# Patient Record
Sex: FEMALE | Race: WHITE | Employment: OTHER | URBAN - METROPOLITAN AREA
[De-identification: names, ages, dates, MRNs, and addresses within clinical notes are randomized per-mention and may not be internally consistent; named-entity substitution may affect disease eponyms.]

---

## 2017-05-19 ENCOUNTER — HOSPITAL ENCOUNTER (OUTPATIENT)
Dept: LAB | Age: 77
Discharge: HOME | End: 2017-05-19
Payer: MEDICARE

## 2017-05-19 DIAGNOSIS — D69.3 CHRONIC ITP (IDIOPATHIC THROMBOCYTOPENIA) (HCC): ICD-10-CM

## 2017-05-19 LAB
BASOPHILS # BLD AUTO: 0.1 K/UL (ref 0–0.2)
BASOPHILS # BLD: 1 % (ref 0–2)
DIFFERENTIAL METHOD BLD: ABNORMAL
EOSINOPHIL # BLD: 0.2 K/UL (ref 0–0.8)
EOSINOPHIL NFR BLD: 2 % (ref 0.5–7.8)
ERYTHROCYTE [DISTWIDTH] IN BLOOD BY AUTOMATED COUNT: 15 % (ref 11.9–14.6)
HCT VFR BLD AUTO: 43.9 % (ref 35.8–46.3)
HGB BLD-MCNC: 14.8 G/DL (ref 11.7–15.4)
LYMPHOCYTES # BLD AUTO: 19 % (ref 13–44)
LYMPHOCYTES # BLD: 2 K/UL (ref 0.5–4.6)
MCH RBC QN AUTO: 31.9 PG (ref 26.1–32.9)
MCHC RBC AUTO-ENTMCNC: 33.7 G/DL (ref 31.4–35)
MCV RBC AUTO: 94.6 FL (ref 79.6–97.8)
MONOCYTES # BLD: 1 K/UL (ref 0.1–1.3)
MONOCYTES NFR BLD AUTO: 10 % (ref 4–12)
NEUTS SEG # BLD: 7.3 K/UL (ref 1.7–8.2)
NEUTS SEG NFR BLD AUTO: 69 % (ref 43–78)
NRBC # BLD: 0 K/UL (ref 0–0.2)
PLATELET # BLD AUTO: 167 K/UL (ref 150–450)
PMV BLD AUTO: 8.3 FL (ref 10.8–14.1)
RBC # BLD AUTO: 4.64 M/UL (ref 4.05–5.25)
WBC # BLD AUTO: 10.5 K/UL (ref 4.3–11.1)

## 2017-05-19 PROCEDURE — 85025 COMPLETE CBC W/AUTO DIFF WBC: CPT | Performed by: INTERNAL MEDICINE

## 2017-05-19 PROCEDURE — 36415 COLL VENOUS BLD VENIPUNCTURE: CPT | Performed by: INTERNAL MEDICINE

## 2017-06-26 PROBLEM — R05.9 COUGH: Chronic | Status: ACTIVE | Noted: 2017-06-26

## 2017-06-28 ENCOUNTER — HOSPITAL ENCOUNTER (OUTPATIENT)
Dept: GENERAL RADIOLOGY | Age: 77
Discharge: HOME | End: 2017-06-28
Attending: INTERNAL MEDICINE
Payer: MEDICARE

## 2017-06-28 VITALS — BODY MASS INDEX: 31.15 KG/M2 | WEIGHT: 165 LBS | HEIGHT: 61 IN

## 2017-06-28 DIAGNOSIS — R13.12 OROPHARYNGEAL DYSPHAGIA: ICD-10-CM

## 2017-06-28 PROCEDURE — 74011000255 HC RX REV CODE- 255: Performed by: INTERNAL MEDICINE

## 2017-06-28 PROCEDURE — 74011000250 HC RX REV CODE- 250: Performed by: INTERNAL MEDICINE

## 2017-06-28 PROCEDURE — 74220 X-RAY XM ESOPHAGUS 1CNTRST: CPT

## 2017-06-28 RX ADMIN — BARIUM SULFATE 135 ML: 980 POWDER, FOR SUSPENSION ORAL at 08:35

## 2017-06-28 RX ADMIN — ANTACID/ANTIFLATULENT 4 G: 380; 550; 10; 10 GRANULE, EFFERVESCENT ORAL at 08:35

## 2017-10-03 ENCOUNTER — HOSPITAL ENCOUNTER (OUTPATIENT)
Dept: CT IMAGING | Age: 77
Discharge: HOME | End: 2017-10-03
Attending: INTERNAL MEDICINE
Payer: MEDICARE

## 2017-10-03 DIAGNOSIS — S09.93XA FACIAL INJURY, INITIAL ENCOUNTER: ICD-10-CM

## 2017-10-03 PROCEDURE — 70486 CT MAXILLOFACIAL W/O DYE: CPT

## 2017-10-11 ENCOUNTER — APPOINTMENT (OUTPATIENT)
Dept: GENERAL RADIOLOGY | Age: 77
End: 2017-10-11
Payer: MEDICARE

## 2017-10-11 ENCOUNTER — APPOINTMENT (OUTPATIENT)
Dept: CT IMAGING | Age: 77
End: 2017-10-11
Payer: MEDICARE

## 2017-10-11 ENCOUNTER — HOSPITAL ENCOUNTER (EMERGENCY)
Age: 77
Discharge: HOME | End: 2017-10-11
Attending: EMERGENCY MEDICINE
Payer: MEDICARE

## 2017-10-11 VITALS
RESPIRATION RATE: 18 BRPM | SYSTOLIC BLOOD PRESSURE: 165 MMHG | TEMPERATURE: 98 F | DIASTOLIC BLOOD PRESSURE: 87 MMHG | HEART RATE: 88 BPM | OXYGEN SATURATION: 96 % | HEIGHT: 61 IN | BODY MASS INDEX: 32.47 KG/M2 | WEIGHT: 172 LBS

## 2017-10-11 DIAGNOSIS — S06.0X0A CONCUSSION WITHOUT LOSS OF CONSCIOUSNESS, INITIAL ENCOUNTER: Primary | ICD-10-CM

## 2017-10-11 LAB
ALBUMIN SERPL-MCNC: 3.6 G/DL (ref 3.2–4.6)
ALBUMIN/GLOB SERPL: 0.9 {RATIO} (ref 1.2–3.5)
ALP SERPL-CCNC: 86 U/L (ref 50–136)
ALT SERPL-CCNC: 31 U/L (ref 12–65)
ANION GAP SERPL CALC-SCNC: 10 MMOL/L (ref 7–16)
APTT PPP: 32.5 SEC (ref 23.5–31.7)
AST SERPL-CCNC: 32 U/L (ref 15–37)
ATRIAL RATE: 89 BPM
BASOPHILS # BLD: 0.1 K/UL (ref 0–0.2)
BASOPHILS NFR BLD: 1 % (ref 0–2)
BILIRUB SERPL-MCNC: 0.6 MG/DL (ref 0.2–1.1)
BUN SERPL-MCNC: 16 MG/DL (ref 8–23)
CALCIUM SERPL-MCNC: 9.2 MG/DL (ref 8.3–10.4)
CALCULATED P AXIS, ECG09: 44 DEGREES
CALCULATED R AXIS, ECG10: 36 DEGREES
CALCULATED T AXIS, ECG11: -79 DEGREES
CHLORIDE SERPL-SCNC: 100 MMOL/L (ref 98–107)
CO2 SERPL-SCNC: 27 MMOL/L (ref 21–32)
CREAT SERPL-MCNC: 1.16 MG/DL (ref 0.6–1)
DIAGNOSIS, 93000: NORMAL
DIFFERENTIAL METHOD BLD: ABNORMAL
EOSINOPHIL # BLD: 0.4 K/UL (ref 0–0.8)
EOSINOPHIL NFR BLD: 5 % (ref 0.5–7.8)
ERYTHROCYTE [DISTWIDTH] IN BLOOD BY AUTOMATED COUNT: 13.9 % (ref 11.9–14.6)
GLOBULIN SER CALC-MCNC: 4.2 G/DL (ref 2.3–3.5)
GLUCOSE SERPL-MCNC: 103 MG/DL (ref 65–100)
HCT VFR BLD AUTO: 39.9 % (ref 35.8–46.3)
HGB BLD-MCNC: 13.6 G/DL (ref 11.7–15.4)
IMM GRANULOCYTES # BLD: 0 K/UL (ref 0–0.5)
IMM GRANULOCYTES NFR BLD: 0.5 % (ref 0–5)
LYMPHOCYTES # BLD: 3.5 K/UL (ref 0.5–4.6)
LYMPHOCYTES NFR BLD: 43 % (ref 13–44)
MCH RBC QN AUTO: 31.6 PG (ref 26.1–32.9)
MCHC RBC AUTO-ENTMCNC: 34.1 G/DL (ref 31.4–35)
MCV RBC AUTO: 92.8 FL (ref 79.6–97.8)
MONOCYTES # BLD: 0.9 K/UL (ref 0.1–1.3)
MONOCYTES NFR BLD: 12 % (ref 4–12)
NEUTS SEG # BLD: 3.2 K/UL (ref 1.7–8.2)
NEUTS SEG NFR BLD: 39 % (ref 43–78)
P-R INTERVAL, ECG05: 186 MS
PLATELET # BLD AUTO: 276 K/UL (ref 150–450)
PMV BLD AUTO: 9 FL (ref 10.8–14.1)
POTASSIUM SERPL-SCNC: 3.8 MMOL/L (ref 3.5–5.1)
PROT SERPL-MCNC: 7.8 G/DL (ref 6.3–8.2)
Q-T INTERVAL, ECG07: 378 MS
QRS DURATION, ECG06: 130 MS
QTC CALCULATION (BEZET), ECG08: 459 MS
RBC # BLD AUTO: 4.3 M/UL (ref 4.05–5.25)
SODIUM SERPL-SCNC: 137 MMOL/L (ref 136–145)
VENTRICULAR RATE, ECG03: 89 BPM
WBC # BLD AUTO: 8.1 K/UL (ref 4.3–11.1)

## 2017-10-11 PROCEDURE — 80053 COMPREHEN METABOLIC PANEL: CPT | Performed by: PHYSICIAN ASSISTANT

## 2017-10-11 PROCEDURE — 70450 CT HEAD/BRAIN W/O DYE: CPT

## 2017-10-11 PROCEDURE — 85730 THROMBOPLASTIN TIME PARTIAL: CPT | Performed by: PHYSICIAN ASSISTANT

## 2017-10-11 PROCEDURE — 93005 ELECTROCARDIOGRAM TRACING: CPT | Performed by: PHYSICIAN ASSISTANT

## 2017-10-11 PROCEDURE — 71020 XR CHEST PA LAT: CPT

## 2017-10-11 PROCEDURE — 85025 COMPLETE CBC W/AUTO DIFF WBC: CPT | Performed by: PHYSICIAN ASSISTANT

## 2017-10-11 PROCEDURE — 99284 EMERGENCY DEPT VISIT MOD MDM: CPT | Performed by: EMERGENCY MEDICINE

## 2017-10-11 RX ORDER — MECLIZINE HYDROCHLORIDE 25 MG/1
25 TABLET ORAL
Qty: 19 TAB | Refills: 0 | Status: SHIPPED | OUTPATIENT
Start: 2017-10-11 | End: 2017-11-14

## 2017-10-11 RX ORDER — ONDANSETRON 4 MG/1
4 TABLET, ORALLY DISINTEGRATING ORAL
Qty: 15 TAB | Refills: 0 | Status: SHIPPED | OUTPATIENT
Start: 2017-10-11 | End: 2018-10-15 | Stop reason: SDUPTHER

## 2017-10-11 NOTE — ED TRIAGE NOTES
Pt arrived ambulatory via POV with c/o nausea. Pt stated she has been \"blacking out\" and \"blacked out\" last Sunday and fell on her face. She had CT done and no fx noted. Pt had f/u with provider office yesterday for continued episodes of \"blacking out\" and nausea.

## 2017-10-11 NOTE — DISCHARGE INSTRUCTIONS
Concussion: Care Instructions  Your Care Instructions    A concussion is a kind of injury to the brain. It happens when the head receives a hard blow. The impact can jar or shake the brain against the skull. This interrupts the brain's normal activities. Although you may have cuts or bruises on your head or face, you may have no other visible signs of a brain injury. In most cases, damage to the brain from a concussion can't be seen in tests such as a CT or MRI scan. For a few weeks, you may have low energy, dizziness, trouble sleeping, a headache, ringing in your ears, or nausea. You may also feel anxious, grumpy, or depressed. You may have problems with memory and concentration. These symptoms are common after a concussion. They should slowly improve over time. Sometimes this takes weeks or even months. Someone who lives with you should know how to care for you. Please share this and all information with a caregiver who will be available to help if needed. Follow-up care is a key part of your treatment and safety. Be sure to make and go to all appointments, and call your doctor if you are having problems. It's also a good idea to know your test results and keep a list of the medicines you take. How can you care for yourself at home? Pain control  · Put ice or a cold pack on the part of your head that hurts for 10 to 20 minutes at a time. Put a thin cloth between the ice and your skin. · Be safe with medicines. Read and follow all instructions on the label. ¨ If the doctor gave you a prescription medicine for pain, take it as prescribed. ¨ If you are not taking a prescription pain medicine, ask your doctor if you can take an over-the-counter medicine. Recovery  · Follow your doctor's instructions. He or she will tell you if you need someone to watch you closely for the next 24 hours or longer. · Rest is the best way to recover from a concussion.  You need to rest your body and your brain:  ¨ Get plenty of sleep at night. And take rest breaks during the day. ¨ Avoid activities that take a lot of physical or mental work. This includes housework, exercise, schoolwork, video games, text messaging, and using the computer. ¨ You may need to change your school or work schedule while you recover. ¨ Return to your normal activities slowly. Do not try to do too much at once. · Do not drink alcohol or use illegal drugs. Alcohol and illegal drugs can slow your recovery. And they can increase your risk of a second brain injury. · Avoid activities that could lead to another concussion. Follow your doctor's instructions for a gradual return to activity and sports. · Ask your doctor when it's okay for you to drive a car, ride a bike, or operate machinery. How should you return to activity? Your return to sports or activity should be gradual. It should only begin when all symptoms of a concussion are gone, both while at rest and during exercise or exertion. Doctors and concussion specialists suggest steps to follow for returning to sports after a concussion. Use these steps as a guide. In most places, your doctor must give you written permission for your child to begin the steps and return to sports. You should slowly progress through the following levels of activity:  1. No activity. This means complete physical and mental rest.  2. Light aerobic activity. This can include walking, swimming, or other exercise at less than 70% of maximum heart rate. No resistance training is included in this step. 3. Sport-specific exercise. This includes running drills or skating drills (depending on the sport), but no head impact. 4. Noncontact training drills. This includes more complex training drills such as passing. The athlete may also begin light resistance training. 5. Full-contact practice. The athlete can participate in normal training. 6. Return to normal game play.  This is the final step and allows the athlete to join in normal game play. Watch and keep track of your progress. It should take at least 6 days for you to go from light activity to normal game play. Make sure that you can stay at each new level of activity for at least 24 hours without symptoms, or as long as your doctor says, before doing more. If one or more symptoms come back, return to a lower level of activity for at least 24 hours. Don't move on until all symptoms are gone. When should you call for help? Call 911 anytime you think you may need emergency care. For example, call if:  · You have a seizure. · You passed out (lost consciousness). · You are confused or can't stay awake. Call your doctor now or seek immediate medical care if:  · You have new or worse vomiting. · You feel less alert. · You have new weakness or numbness in any part of your body. Watch closely for changes in your health, and be sure to contact your doctor if:  · You do not get better as expected. · You have new symptoms, such as headaches, trouble concentrating, or changes in mood. Where can you learn more? Go to http://tirso-randell.info/. Enter I947 in the search box to learn more about \"Concussion: Care Instructions. \"  Current as of: September 20, 2016  Content Version: 11.3  © 5962-3274 [employer]. Care instructions adapted under license by [employer] (which disclaims liability or warranty for this information). If you have questions about a medical condition or this instruction, always ask your healthcare professional. [place] [address] any warranty or liability for your use of this information.

## 2017-10-11 NOTE — ED NOTES
I have reviewed discharge instructions with the patient. The patient verbalized understanding. Patient left ED via Discharge Method: wheelchair to Home with spouse. Opportunity for questions and clarification provided. Patient given 2 scripts.

## 2017-10-11 NOTE — ED PROVIDER NOTES
HPI Comments: 72-year-old female presents with complaints of headache, nausea, and dizzy spells. Ongoing for partially week and a half resulting from a fall where she fell on the grass and fell on her face. Patient had an outpatient CT of the maxillofacial bones, which was unremarkable. Her primary care doctor has her scheduled for MRI of the brain next week. Patient is a 68 y.o. female presenting with nausea. The history is provided by the patient and the spouse. Nausea    This is a recurrent problem. The current episode started more than 2 days ago. The problem has not changed since onset. There has been no fever. Associated symptoms include headaches, arthralgias, cough and headaches. Pertinent negatives include no chills, no fever, no abdominal pain, no diarrhea and no myalgias. The patient is not pregnant. Risk factors: closed head injury. Her pertinent negatives include no irritable bowel syndrome, no gastric bypass and no DM.         Past Medical History:   Diagnosis Date    Acute on chronic combined systolic and diastolic heart failure ([address] 6/11/2015    Acute renal failure (ARF) ([address] 6/13/2015    Adiposity 11/10/2015    Arthritis 4/9/2014    Bilateral carotid artery stenosis     CAD (coronary artery disease) 4/9/2014    Moderate CAD 4/15     Coagulation disorder ([address]     Congestive heart failure (CHF) ([address]     Dyspnea 6/11/2015    Dyspnea on exertion 4/9/2014    GERD (gastroesophageal reflux disease)     History of ITP     2012 in [facility] for treatments-last one Nov '12    Hyperlipidemia 4/9/2014    Hypertension 4/9/2014    Hyponatremia 6/11/2015    Hypothyroid     Hypothyroidism 6/11/2015    Hypoxemia 7/16/2014    Left atrial dilation     Liver disease     elevated enzymes-     Lung nodule 4/9/2014    [facility]15/[facility]--10 mm nodule in right mid lung; PET negative 6/13/2011 CT  10/16/2011 @ [place], [place]--Multiple right lung nodules without adenopathy     MI (myocardial infarction)     2009- no intervention- mild/ EF per echo 9/5/13= 40%    Nausea and vomiting 6/13/2016    Other ill-defined conditions(799.89)     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    Pulmonary fibrosis ([address] 4/9/2014    [facility]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     Pulmonary hypertension     Pulmonary valve regurgitation     PVC (premature ventricular contraction) 4/25/2014    Raynaud's disease 4/9/2014    Right atrial dilation     Severe tricuspid regurgitation     Sicca syndrome, Sjogren's ([address] 4/9/2015    Thrombocytopenia ([address] 10/12/2015       Past Surgical History:   Procedure Laterality Date    HX APPENDECTOMY      HX COLONOSCOPY      2002 or 2004 polyps Elizabeth    HX HERNIA REPAIR      bilat    HX HYSTERECTOMY      HX ORTHOPAEDIC      spinal/transplant 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 Social History Main Topics    Smoking status: Never Smoker    Smokeless tobacco: Never Used    Alcohol use Yes      Comment: occas    Drug use: No    Sexual activity: Not on file     Other Topics Concern    Not on file     Social History Narrative     and lives alone. Daughter lives in [place]. No pets. Retired, previously worked as an . ALLERGIES: Other food; Esomeprazole magnesium; Iodinated contrast- oral and iv dye; Iodine and iodide containing products; Shellfish derived; Amitriptyline; Amlodipine; Amoxicillin; Benicar [olmesartan];  Bystolic [nebivolol]; Flecainide; Hydrochlorothiazide; Ibuprofen; Iodine; Keflex [cephalexin]; Levofloxacin (bulk); Lisinopril; Lisinopril-hydrochlorothiazide; Nexium [esomeprazole magnesium]; Nitrofurantoin; Nitrofurantoin macrocrystal; Nitrofurantoin macrocrystalline; and Sulfa (sulfonamide antibiotics)    Review of Systems   Constitutional: Negative for chills and fever. HENT: Negative for congestion, ear pain and rhinorrhea. Eyes: Negative for photophobia and discharge. Respiratory: Positive for cough and wheezing. Negative for shortness of breath. Cardiovascular: Negative for chest pain and palpitations. Gastrointestinal: Positive for nausea. Negative for abdominal pain, constipation, diarrhea and vomiting. Endocrine: Negative for cold intolerance and heat intolerance. Genitourinary: Negative for dysuria and flank pain. Musculoskeletal: Positive for arthralgias. Negative for myalgias and neck pain. Skin: Negative for rash and wound. Allergic/Immunologic: Negative for environmental allergies and food allergies. Neurological: Positive for headaches. Negative for syncope. Hematological: Negative for adenopathy. Does not bruise/bleed easily. Psychiatric/Behavioral: Negative for dysphoric mood. The patient is not nervous/anxious. All other systems reviewed and are negative. Vitals:    10/11/17 1628   BP: 166/88   Pulse: 91   Resp: 20   Temp: 98 °F (36.7 °C)   SpO2: 98%   Weight: 78 kg (172 lb)   Height: 5' 1\" (1.549 m)            Physical Exam   Constitutional: She is oriented to person, place, and time. She appears well-developed and well-nourished. She appears distressed. HENT:   Head: Normocephalic and atraumatic. Mouth/Throat: Oropharynx is clear and moist.   Eyes: Conjunctivae and EOM are normal. Pupils are equal, round, and reactive to light. Right eye exhibits no discharge. Left eye exhibits no discharge. No scleral icterus. Neck: Normal range of motion. Neck supple.  No thyromegaly present. Cardiovascular: Normal rate, regular rhythm, normal heart sounds and intact distal pulses. Exam reveals no gallop and no friction rub. No murmur heard. Pulmonary/Chest: Effort normal and breath sounds normal. No respiratory distress. She has no wheezes. She exhibits no tenderness. Abdominal: Soft. Bowel sounds are normal. She exhibits no distension. There is no hepatosplenomegaly. There is no tenderness. There is no rebound and no guarding. No hernia. Tolerating ice chips   Musculoskeletal: Normal range of motion. She exhibits no edema or tenderness. Neurological: She is alert and oriented to person, place, and time. No cranial nerve deficit. She exhibits normal muscle tone. GCS eye subscore is 4. GCS verbal subscore is 5. GCS motor subscore is 6. Skin: Skin is warm, dry and intact. No rash noted. She is not diaphoretic. No erythema. No pallor. Psychiatric: She has a normal mood and affect. Her speech is normal and behavior is normal. Judgment and thought content normal. Cognition and memory are normal.   Nursing note and vitals reviewed. MDM  Number of Diagnoses or Management Options  Concussion without loss of consciousness, initial encounter: established and worsening  Diagnosis management comments: `cT head unremarkable    patient is not anemic, has normal renal function, normal electrolytes. Patient's symptoms are consistent with a concussion. We'll treat her symptomatically and have asked her to recheck with her family doctor       Amount and/or Complexity of Data Reviewed  Clinical lab tests: ordered and reviewed  Tests in the radiology section of CPT®: ordered and reviewed  Review and summarize past medical records: yes    Risk of Complications, Morbidity, and/or Mortality  Presenting problems: high  Diagnostic procedures: moderate  Management options: moderate  General comments: Elements of this note have been dictated via voice recognition software.   Text and phrases may be limited by the accuracy of the software. The chart has been reviewed, but errors may still be present. Patient Progress  Patient progress: improved    ED Course   Comment By Time   CHARISSE Rivera is a 68 y.o. female here for syncope multiple times, facial injury. Rapid assessment performed. --- Orders were placed. --- Patient will be roomed. I have seen and briefly evaluated the patient in triage in order to expedite their workup and treatment as defined by the department policy and procedure. Their care will be completed in the emergency department by another provider. The work up will not be complete until this further evaluation is completed by another provider.     Signed By: ANUPAMA Willis    October 11, 2017 Vivi Willis [id number]       Procedures

## 2017-10-14 ENCOUNTER — PATIENT OUTREACH (OUTPATIENT)
Dept: CASE MANAGEMENT | Age: 77
End: 2017-10-14

## 2017-10-14 NOTE — PROGRESS NOTES
Date/Time of Call:       10/12/17 10:30AM   What was the patient seen in the ED for? Patient was seen in ED for diagnosis of Concussion w/out loss of conciousness    Does the patient understand his/her diagnosis and/or treatment and what happened during the ED visit? Spoke with patient who stated understanding of treatment and diagnosis. Did the patient receive discharge instructions from the ED? Patient stated receiving d/c instructions from the ED. Review any discharge instructions (see notes in [facility]). Ask patient if they understand these. Do they have any questions? Patient and Care Coordinator reviewed DC instructions. Patient stated understanding and no questions asked. Were home services ordered (nursing, PT, OT, ST, etc.)? No HH ordered at d/c   If so, has the first visit occurred? If not, why? (Assist with coordination of services if necessary.) N/A   DME ordered at d/c? No DME ordered at d/c. Pt. states she has access to  w/c, walker, and cane as needed   If so, has it been received? If not, why?  (Assist with coordination of arranging DME orders if necessary.) N/A   Complete a review of all medications (new, continued and discontinued meds per the D/C instructions and medication tab in [address] [place] [address]). Review of medications has been completed by Patient and Care Coordinator. Zofran and Antivert prescribed at d/c. Were all new prescriptions filled? If not, why?  (Assist with obtainment of medications if necessary.) Yes. Does the patient understand the purpose and dosing instructions for all medications? (If patient has questions, provide explanation and education.) Patient stated understanding of purpose, and dosing instructions for all medications. Does the patient have any problems in performing ADLs? (If patient is unable to perform ADLs  what is the limiting factor(s)?   Do they have a support system that can assist? If no support system is present, discuss possible assistance that they may be able to obtain.) Patient stated she is independent with all ADLs. Does the patient have all follow-up appointments scheduled? Has transportation been arranged? 7 day f/up with PCP?    7-14 day f/up with specialist?    If f/up has not been made  what actions has the care coordinator made to accomplish this? Has transportation been arranged? [facility] follow-up should be within 7 days of discharge; all others should have PCP follow-up within 7 days of discharge; follow-ups with other specialists as appropriate or ordered.) PCP appt. on Monday, and pt. states she has transportation. Any other questions or concerns expressed by the patient? No other questions asked. No further needs identified. Gratitude expressed for care and call. ALEJO Call Completed By: Gretel Bates LPN   Care Coordinator  [facility]                                 This note will not be viewable in [address]

## 2017-11-14 ENCOUNTER — HOSPITAL ENCOUNTER (OUTPATIENT)
Dept: LAB | Age: 77
Discharge: HOME | End: 2017-11-14
Attending: INTERNAL MEDICINE
Payer: MEDICARE

## 2017-11-14 ENCOUNTER — HOSPITAL ENCOUNTER (OUTPATIENT)
Dept: LAB | Age: 77
Discharge: HOME | End: 2017-11-14
Payer: MEDICARE

## 2017-11-14 DIAGNOSIS — D69.3 CHRONIC ITP (IDIOPATHIC THROMBOCYTOPENIA) (HCC): ICD-10-CM

## 2017-11-14 DIAGNOSIS — I42.0 CARDIOMYOPATHY, DILATED (HCC): ICD-10-CM

## 2017-11-14 LAB
ALBUMIN SERPL-MCNC: 3.9 G/DL (ref 3.2–4.6)
ALBUMIN SERPL-MCNC: 4 G/DL (ref 3.2–4.6)
ALBUMIN/GLOB SERPL: 1 {RATIO}
ALBUMIN/GLOB SERPL: 1 {RATIO} (ref 1.2–3.5)
ALP SERPL-CCNC: 80 U/L (ref 50–136)
ALP SERPL-CCNC: 83 U/L (ref 50–136)
ALT SERPL-CCNC: 22 U/L (ref 12–65)
ALT SERPL-CCNC: 26 U/L (ref 12–65)
ANION GAP SERPL CALC-SCNC: 8 MMOL/L
ANION GAP SERPL CALC-SCNC: 8 MMOL/L (ref 7–16)
AST SERPL-CCNC: 22 U/L (ref 15–37)
AST SERPL-CCNC: 24 U/L (ref 15–37)
BASOPHILS # BLD: 0.1 K/UL (ref 0–0.2)
BASOPHILS NFR BLD: 1 % (ref 0–2)
BILIRUB SERPL-MCNC: 0.8 MG/DL (ref 0.2–1.1)
BILIRUB SERPL-MCNC: 0.8 MG/DL (ref 0.2–1.1)
BNP SERPL-MCNC: 131 PG/ML
BUN SERPL-MCNC: 16 MG/DL (ref 8–23)
BUN SERPL-MCNC: 17 MG/DL (ref 8–23)
CALCIUM SERPL-MCNC: 10 MG/DL (ref 8.3–10.4)
CALCIUM SERPL-MCNC: 9.7 MG/DL (ref 8.3–10.4)
CHLORIDE SERPL-SCNC: 97 MMOL/L (ref 98–107)
CHLORIDE SERPL-SCNC: 97 MMOL/L (ref 98–107)
CO2 SERPL-SCNC: 30 MMOL/L (ref 21–32)
CO2 SERPL-SCNC: 30 MMOL/L (ref 21–32)
CREAT SERPL-MCNC: 1.1 MG/DL (ref 0.6–1)
CREAT SERPL-MCNC: 1.11 MG/DL (ref 0.6–1)
DIFFERENTIAL METHOD BLD: ABNORMAL
EOSINOPHIL # BLD: 0.3 K/UL (ref 0–0.8)
EOSINOPHIL NFR BLD: 4 % (ref 0.5–7.8)
ERYTHROCYTE [DISTWIDTH] IN BLOOD BY AUTOMATED COUNT: 13.3 % (ref 11.9–14.6)
GLOBULIN SER CALC-MCNC: 4.1 G/DL
GLOBULIN SER CALC-MCNC: 4.1 G/DL (ref 2.3–3.5)
GLUCOSE SERPL-MCNC: 82 MG/DL (ref 65–100)
GLUCOSE SERPL-MCNC: 83 MG/DL (ref 65–100)
HCT VFR BLD AUTO: 42.5 % (ref 35.8–46.3)
HGB BLD-MCNC: 14.2 G/DL (ref 11.7–15.4)
LYMPHOCYTES # BLD: 1.9 K/UL (ref 0.5–4.6)
LYMPHOCYTES NFR BLD: 23 % (ref 13–44)
MAGNESIUM SERPL-MCNC: 1.8 MG/DL (ref 1.8–2.4)
MCH RBC QN AUTO: 31.3 PG (ref 26.1–32.9)
MCHC RBC AUTO-ENTMCNC: 33.4 G/DL (ref 31.4–35)
MCV RBC AUTO: 93.6 FL (ref 79.6–97.8)
MONOCYTES # BLD: 0.9 K/UL (ref 0.1–1.3)
MONOCYTES NFR BLD: 11 % (ref 4–12)
NEUTS SEG # BLD: 4.9 K/UL (ref 1.7–8.2)
NEUTS SEG NFR BLD: 60 % (ref 43–78)
NRBC # BLD: 0 K/UL (ref 0–0.2)
PLATELET # BLD AUTO: 235 K/UL (ref 150–450)
PMV BLD AUTO: 8.9 FL (ref 10.8–14.1)
POTASSIUM SERPL-SCNC: 3.7 MMOL/L (ref 3.5–5.1)
POTASSIUM SERPL-SCNC: 3.9 MMOL/L (ref 3.5–5.1)
PROT SERPL-MCNC: 8 G/DL (ref 6.3–8.2)
PROT SERPL-MCNC: 8.1 G/DL (ref 6.3–8.2)
RBC # BLD AUTO: 4.54 M/UL (ref 4.05–5.25)
SODIUM SERPL-SCNC: 135 MMOL/L (ref 136–145)
SODIUM SERPL-SCNC: 135 MMOL/L (ref 136–145)
TSH SERPL DL<=0.005 MIU/L-ACNC: 2.57 UIU/ML (ref 0.36–3.74)
WBC # BLD AUTO: 8.1 K/UL (ref 4.3–11.1)

## 2017-11-14 PROCEDURE — 83880 ASSAY OF NATRIURETIC PEPTIDE: CPT | Performed by: INTERNAL MEDICINE

## 2017-11-14 PROCEDURE — 83735 ASSAY OF MAGNESIUM: CPT | Performed by: INTERNAL MEDICINE

## 2017-11-14 PROCEDURE — 36415 COLL VENOUS BLD VENIPUNCTURE: CPT | Performed by: INTERNAL MEDICINE

## 2017-11-14 PROCEDURE — 84443 ASSAY THYROID STIM HORMONE: CPT | Performed by: INTERNAL MEDICINE

## 2017-11-14 PROCEDURE — 80053 COMPREHEN METABOLIC PANEL: CPT | Performed by: INTERNAL MEDICINE

## 2017-11-14 NOTE — PROGRESS NOTES
Patient returned my call and I informed her of her results and to remain on lasix. She gave verbal understanding.

## 2017-11-14 NOTE — PROGRESS NOTES
Please call her, remain on lasix. Labs reviewed. No med changes. We will wait on echo and see back after this week.   Thanks

## 2018-01-09 PROBLEM — E55.9 HYPOVITAMINOSIS D: Chronic | Status: ACTIVE | Noted: 2018-01-09

## 2018-01-09 PROBLEM — M15.9 PRIMARY OSTEOARTHRITIS INVOLVING MULTIPLE JOINTS: Chronic | Status: ACTIVE | Noted: 2018-01-09

## 2018-01-09 PROBLEM — K52.831 COLLAGENOUS COLITIS: Chronic | Status: ACTIVE | Noted: 2018-01-09

## 2018-01-17 ENCOUNTER — HOSPITAL ENCOUNTER (OUTPATIENT)
Dept: LAB | Age: 78
Discharge: HOME | End: 2018-01-17
Payer: MEDICARE

## 2018-01-17 DIAGNOSIS — I42.0 CARDIOMYOPATHY, DILATED (HCC): ICD-10-CM

## 2018-01-17 LAB
ANION GAP SERPL CALC-SCNC: 7 MMOL/L
BNP SERPL-MCNC: 168 PG/ML
BUN SERPL-MCNC: 13 MG/DL (ref 8–23)
CALCIUM SERPL-MCNC: 9.9 MG/DL (ref 8.3–10.4)
CHLORIDE SERPL-SCNC: 100 MMOL/L (ref 98–107)
CO2 SERPL-SCNC: 28 MMOL/L (ref 21–32)
CREAT SERPL-MCNC: 1 MG/DL (ref 0.6–1)
GLUCOSE SERPL-MCNC: 89 MG/DL (ref 65–100)
MAGNESIUM SERPL-MCNC: 1.7 MG/DL (ref 1.8–2.4)
POTASSIUM SERPL-SCNC: 4.1 MMOL/L (ref 3.5–5.1)
SODIUM SERPL-SCNC: 135 MMOL/L (ref 136–145)

## 2018-01-17 PROCEDURE — 83880 ASSAY OF NATRIURETIC PEPTIDE: CPT | Performed by: INTERNAL MEDICINE

## 2018-01-17 PROCEDURE — 80048 BASIC METABOLIC PNL TOTAL CA: CPT | Performed by: INTERNAL MEDICINE

## 2018-01-17 PROCEDURE — 36415 COLL VENOUS BLD VENIPUNCTURE: CPT | Performed by: INTERNAL MEDICINE

## 2018-01-17 PROCEDURE — 83735 ASSAY OF MAGNESIUM: CPT | Performed by: INTERNAL MEDICINE

## 2018-01-18 NOTE — PROGRESS NOTES
I called and informed patient of her lab results. She stated that she is feeling much better and has no current concerns. She informed me that she experienced 2 or 3 sharp pains in her breast last week but they went away as fast as they came and she has been fine since. She will keep her appointment on 2/2 to f/u and discuss those pains with Dr. Benjamin Hager. Patient verbalized understanding and appreciation.

## 2018-02-27 ENCOUNTER — HOSPITAL ENCOUNTER (OUTPATIENT)
Dept: LAB | Age: 78
Discharge: HOME | End: 2018-02-27
Payer: MEDICARE

## 2018-02-27 DIAGNOSIS — I25.119 CORONARY ARTERY DISEASE INVOLVING NATIVE CORONARY ARTERY OF NATIVE HEART WITH ANGINA PECTORIS (HCC): Chronic | ICD-10-CM

## 2018-02-27 LAB
ANION GAP SERPL CALC-SCNC: 8 MMOL/L
BASOPHILS # BLD: 0 K/UL (ref 0–0.2)
BASOPHILS NFR BLD: 0 % (ref 0–2)
BNP SERPL-MCNC: 215 PG/ML
BUN SERPL-MCNC: 12 MG/DL (ref 8–23)
CALCIUM SERPL-MCNC: 9.5 MG/DL (ref 8.3–10.4)
CHLORIDE SERPL-SCNC: 96 MMOL/L (ref 98–107)
CO2 SERPL-SCNC: 28 MMOL/L (ref 21–32)
CREAT SERPL-MCNC: 0.9 MG/DL (ref 0.6–1)
DIFFERENTIAL METHOD BLD: ABNORMAL
EOSINOPHIL # BLD: 0.1 K/UL (ref 0–0.8)
EOSINOPHIL NFR BLD: 2 % (ref 0.5–7.8)
ERYTHROCYTE [DISTWIDTH] IN BLOOD BY AUTOMATED COUNT: 13.5 % (ref 11.9–14.6)
GLUCOSE SERPL-MCNC: 104 MG/DL (ref 65–100)
HCT VFR BLD AUTO: 40.5 % (ref 35.8–46.3)
HGB BLD-MCNC: 13.5 G/DL (ref 11.7–15.4)
INR PPP: 0.9
LYMPHOCYTES # BLD: 1.5 K/UL (ref 0.5–4.6)
LYMPHOCYTES NFR BLD: 17 % (ref 13–44)
MCH RBC QN AUTO: 31.5 PG (ref 26.1–32.9)
MCHC RBC AUTO-ENTMCNC: 33.3 G/DL (ref 31.4–35)
MCV RBC AUTO: 94.6 FL (ref 79.6–97.8)
MONOCYTES # BLD: 1 K/UL (ref 0.1–1.3)
MONOCYTES NFR BLD: 11 % (ref 4–12)
NEUTS SEG # BLD: 6.1 K/UL (ref 1.7–8.2)
NEUTS SEG NFR BLD: 70 % (ref 43–78)
PLATELET # BLD AUTO: 248 K/UL (ref 150–450)
PMV BLD AUTO: 8.3 FL (ref 10.8–14.1)
POTASSIUM SERPL-SCNC: 4.1 MMOL/L (ref 3.5–5.1)
PROTHROMBIN TIME: 12.4 SEC (ref 11.5–14.5)
RBC # BLD AUTO: 4.28 M/UL (ref 4.05–5.25)
SODIUM SERPL-SCNC: 132 MMOL/L (ref 136–145)
WBC # BLD AUTO: 8.7 K/UL (ref 4.3–11.1)

## 2018-02-27 PROCEDURE — 83880 ASSAY OF NATRIURETIC PEPTIDE: CPT | Performed by: INTERNAL MEDICINE

## 2018-02-27 PROCEDURE — 36415 COLL VENOUS BLD VENIPUNCTURE: CPT | Performed by: INTERNAL MEDICINE

## 2018-02-27 PROCEDURE — 85025 COMPLETE CBC W/AUTO DIFF WBC: CPT | Performed by: INTERNAL MEDICINE

## 2018-02-27 PROCEDURE — 85610 PROTHROMBIN TIME: CPT | Performed by: INTERNAL MEDICINE

## 2018-02-27 PROCEDURE — 80048 BASIC METABOLIC PNL TOTAL CA: CPT | Performed by: INTERNAL MEDICINE

## 2018-02-27 NOTE — PROGRESS NOTES
I called and informed the patient of lab results. Patient stated that she did not have any questions or concerns at this time.

## 2018-03-09 ENCOUNTER — HOSPITAL ENCOUNTER (OUTPATIENT)
Dept: CARDIAC CATH/INVASIVE PROCEDURES | Age: 78
Discharge: HOME | End: 2018-03-09
Attending: INTERNAL MEDICINE | Admitting: INTERNAL MEDICINE
Payer: MEDICARE

## 2018-03-09 VITALS
HEIGHT: 61 IN | BODY MASS INDEX: 30.58 KG/M2 | HEART RATE: 65 BPM | SYSTOLIC BLOOD PRESSURE: 175 MMHG | DIASTOLIC BLOOD PRESSURE: 97 MMHG | WEIGHT: 162 LBS | OXYGEN SATURATION: 100 % | RESPIRATION RATE: 16 BRPM | TEMPERATURE: 97.6 F

## 2018-03-09 LAB
ATRIAL RATE: 72 BPM
CALCULATED P AXIS, ECG09: 48 DEGREES
CALCULATED R AXIS, ECG10: 31 DEGREES
CALCULATED T AXIS, ECG11: 20 DEGREES
DIAGNOSIS, 93000: NORMAL
P-R INTERVAL, ECG05: 204 MS
Q-T INTERVAL, ECG07: 426 MS
QRS DURATION, ECG06: 134 MS
QTC CALCULATION (BEZET), ECG08: 466 MS
VENTRICULAR RATE, ECG03: 72 BPM

## 2018-03-09 PROCEDURE — 74011636320 HC RX REV CODE- 636/320: Performed by: INTERNAL MEDICINE

## 2018-03-09 PROCEDURE — C1751 CATH, INF, PER/CENT/MIDLINE: HCPCS

## 2018-03-09 PROCEDURE — 74011250636 HC RX REV CODE- 250/636: Performed by: INTERNAL MEDICINE

## 2018-03-09 PROCEDURE — 99153 MOD SED SAME PHYS/QHP EA: CPT

## 2018-03-09 PROCEDURE — C1894 INTRO/SHEATH, NON-LASER: HCPCS

## 2018-03-09 PROCEDURE — 74011250637 HC RX REV CODE- 250/637: Performed by: INTERNAL MEDICINE

## 2018-03-09 PROCEDURE — 74011250636 HC RX REV CODE- 250/636

## 2018-03-09 PROCEDURE — 77030004534 HC CATH ANGI DX INFN CARD -A

## 2018-03-09 PROCEDURE — 99152 MOD SED SAME PHYS/QHP 5/>YRS: CPT

## 2018-03-09 PROCEDURE — C1769 GUIDE WIRE: HCPCS

## 2018-03-09 PROCEDURE — 77030004558 HC CATH ANGI DX SUPR TORQ CARD -A

## 2018-03-09 PROCEDURE — 77030019569 HC BND COMPR RAD TERU -B

## 2018-03-09 PROCEDURE — 93460 R&L HRT ART/VENTRICLE ANGIO: CPT

## 2018-03-09 PROCEDURE — 74011000250 HC RX REV CODE- 250: Performed by: INTERNAL MEDICINE

## 2018-03-09 PROCEDURE — 93005 ELECTROCARDIOGRAM TRACING: CPT | Performed by: INTERNAL MEDICINE

## 2018-03-09 RX ORDER — HEPARIN SODIUM 200 [USP'U]/100ML
3 INJECTION, SOLUTION INTRAVENOUS CONTINUOUS
Status: DISCONTINUED | OUTPATIENT
Start: 2018-03-09 | End: 2018-03-09

## 2018-03-09 RX ORDER — GUAIFENESIN 100 MG/5ML
81-324 LIQUID (ML) ORAL ONCE
Status: COMPLETED | OUTPATIENT
Start: 2018-03-09 | End: 2018-03-09

## 2018-03-09 RX ORDER — DIAZEPAM 5 MG/1
5 TABLET ORAL ONCE
Status: DISCONTINUED | OUTPATIENT
Start: 2018-03-09 | End: 2018-03-09 | Stop reason: HOSPADM

## 2018-03-09 RX ORDER — SODIUM CHLORIDE 9 MG/ML
75 INJECTION, SOLUTION INTRAVENOUS CONTINUOUS
Status: DISCONTINUED | OUTPATIENT
Start: 2018-03-09 | End: 2018-03-09 | Stop reason: HOSPADM

## 2018-03-09 RX ORDER — ACETAMINOPHEN 325 MG/1
650 TABLET ORAL ONCE
Status: COMPLETED | OUTPATIENT
Start: 2018-03-09 | End: 2018-03-09

## 2018-03-09 RX ORDER — MIDAZOLAM HYDROCHLORIDE 1 MG/ML
.5-2 INJECTION, SOLUTION INTRAMUSCULAR; INTRAVENOUS
Status: DISCONTINUED | OUTPATIENT
Start: 2018-03-09 | End: 2018-03-09

## 2018-03-09 RX ORDER — FAMOTIDINE 10 MG/ML
20 INJECTION INTRAVENOUS ONCE
Status: COMPLETED | OUTPATIENT
Start: 2018-03-09 | End: 2018-03-09

## 2018-03-09 RX ORDER — HYDROCORTISONE SODIUM SUCCINATE 100 MG/2ML
100 INJECTION, POWDER, FOR SOLUTION INTRAMUSCULAR; INTRAVENOUS ONCE
Status: COMPLETED | OUTPATIENT
Start: 2018-03-09 | End: 2018-03-09

## 2018-03-09 RX ORDER — LIDOCAINE HYDROCHLORIDE 20 MG/ML
60-200 INJECTION, SOLUTION INFILTRATION; PERINEURAL
Status: DISCONTINUED | OUTPATIENT
Start: 2018-03-09 | End: 2018-03-09

## 2018-03-09 RX ORDER — DIPHENHYDRAMINE HYDROCHLORIDE 50 MG/ML
50 INJECTION, SOLUTION INTRAMUSCULAR; INTRAVENOUS ONCE
Status: COMPLETED | OUTPATIENT
Start: 2018-03-09 | End: 2018-03-09

## 2018-03-09 RX ADMIN — HEPARIN SODIUM 3 UNITS/HR: 200 INJECTION, SOLUTION INTRAVENOUS at 08:30

## 2018-03-09 RX ADMIN — ASPIRIN 81 MG 81 MG: 81 TABLET ORAL at 07:33

## 2018-03-09 RX ADMIN — MIDAZOLAM HYDROCHLORIDE 2 MG: 1 INJECTION, SOLUTION INTRAMUSCULAR; INTRAVENOUS at 08:35

## 2018-03-09 RX ADMIN — HEPARIN SODIUM 3 UNITS/HR: 200 INJECTION, SOLUTION INTRAVENOUS at 08:09

## 2018-03-09 RX ADMIN — IOPAMIDOL 80 ML: 755 INJECTION, SOLUTION INTRAVENOUS at 09:26

## 2018-03-09 RX ADMIN — SODIUM CHLORIDE 75 ML/HR: 900 INJECTION, SOLUTION INTRAVENOUS at 07:55

## 2018-03-09 RX ADMIN — LIDOCAINE HYDROCHLORIDE 40 MG: 20 INJECTION, SOLUTION INFILTRATION; PERINEURAL at 08:50

## 2018-03-09 RX ADMIN — HEPARIN SODIUM 2 ML: 10000 INJECTION, SOLUTION INTRAVENOUS; SUBCUTANEOUS at 09:05

## 2018-03-09 RX ADMIN — FAMOTIDINE 20 MG: 10 INJECTION INTRAVENOUS at 07:32

## 2018-03-09 RX ADMIN — HYDROCORTISONE SODIUM SUCCINATE 100 MG: 100 INJECTION, POWDER, FOR SOLUTION INTRAMUSCULAR; INTRAVENOUS at 07:33

## 2018-03-09 RX ADMIN — MIDAZOLAM HYDROCHLORIDE 2 MG: 1 INJECTION, SOLUTION INTRAMUSCULAR; INTRAVENOUS at 09:07

## 2018-03-09 RX ADMIN — ACETAMINOPHEN 650 MG: 325 TABLET, FILM COATED ORAL at 10:40

## 2018-03-09 RX ADMIN — LIDOCAINE HYDROCHLORIDE 40 MG: 20 INJECTION, SOLUTION INFILTRATION; PERINEURAL at 09:03

## 2018-03-09 RX ADMIN — DIPHENHYDRAMINE HYDROCHLORIDE 50 MG: 50 INJECTION, SOLUTION INTRAMUSCULAR; INTRAVENOUS at 07:33

## 2018-03-09 NOTE — IP AVS SNAPSHOT
[address] [facility] 
[phone] Patient: Rodríguez Galindo MRN: [id number] BTN:[id number] Discharge Summary 3/9/2018 Rodríguez Galindo MRN[de-identified]  [id number] Admission Information Provider Pager Service Admission Date Expected D/C Date Shashi Lea, [address] [facility] CATH LAB 3/9/2018 3/9/2018 Actual LOS Patient Class 0 days OUTPATIENT Follow-up Information Follow up With Details Comments Contact Info Toby Payne DO On 3/12/2018 at [address] [facility] a.m. 2 [address] [facility] [address] [facility] Cardiology [facility] [place] [address] 
[phone] My Medications TAKE these medications as instructed Instructions Each Dose to Equal  
 Morning Noon Evening Bedtime ADCIRCA 20 mg tablet Generic drug:  tadalafil Your last dose was: Your next dose is: Take 20 mg by mouth two (2) times a day. For lungs 20 mg  
    
   
   
   
  
 ALDACTONE 25 mg tablet Generic drug:  spironolactone Your last dose was: Your next dose is: Take 12.5 mg by mouth every morning. 12.5 mg  
    
   
   
   
  
 cholecalciferol (vitamin D3) 2,000 unit Tab Your last dose was: Your next dose is: Take 1 Tab by mouth every morning. 2000 Units  
    
   
   
   
  
 estradiol 0.5 mg tablet Commonly known as:  ESTRACE Your last dose was: Your next dose is: Take 1 Tab by mouth daily. 0.5 mg  
    
   
   
   
  
 FISH OIL 1,000 mg (120 mg-180 mg) Cap Generic drug:  Omega-3-DHA-EPA-Fish Oil Your last dose was: Your next dose is: Take  by mouth. furosemide 20 mg tablet Commonly known as:  LASIX Your last dose was: Your next dose is: Take 1 Tab by mouth daily. 20 mg HEALTHY EYES tablet Generic drug:  vit a,c & e-lutein-minerals Your last dose was: Your next dose is: Take 2 Tabs by mouth daily. thera tears 2 Tab  
    
   
   
   
  
 ketoprofen, micronized 10 % Cmap Your last dose was: Your next dose is:    
   
   
 by Apply Externally route. levothyroxine 112 mcg tablet Commonly known as:  SYNTHROID Your last dose was: Your next dose is: Take 1 Tab by mouth Daily (before breakfast). Indications: hypothyroidism 112 mcg MILK THISTLE PO Your last dose was: Your next dose is: Take  by mouth. ondansetron 4 mg disintegrating tablet Commonly known as:  ZOFRAN ODT Your last dose was: Your next dose is: Take 1 Tab by mouth every six (6) hours as needed for Nausea. 4 mg OTHER Your last dose was: Your next dose is:    
   
   
 Ketoprofen 10%. Apply 3 to 4 times daily  Compounded cream RX # [id number] OXYGEN-AIR DELIVERY SYSTEMS Your last dose was: Your next dose is:    
   
   
 2 l/m continuous  Indications: 2 l/m continuous PROBIOTIC 4X 10-15 mg Tbec Generic drug:  B.infantis-B.ani-B.long-B.bifi Your last dose was: Your next dose is: Take  by mouth every morning. therapeutic multivitamin tablet Commonly known as:  [facility] Your last dose was: Your next dose is: Take 1 Tab by mouth daily. 1 Tab  
    
   
   
   
  
 zaleplon 10 mg capsule Commonly known as:  SONATA Your last dose was: Your next dose is: Take 1 Cap by mouth nightly as needed. Max Daily Amount: 10 mg.  
 10 mg  
    
   
   
   
  
  
  
  
 
  
General Information Please provide this summary of care documentation to your next provider. Allergies High:  Other Food;  Esomeprazole Magnesium; Iodinated Contrast- Oral And Iv Dye;  Iodine And Iodide Containing Products; Shellfish Derived Unspecified:  Amitriptyline; Amlodipine; Amoxicillin;  Benicar [Olmesartan]; Bystolic [Nebivolol]; Flecainide; Hydrochlorothiazide; Ibuprofen; Iodine;  Keflex [Cephalexin]; Levofloxacin (Bulk); Lisinopril;  Lisinopril-hydrochlorothiazide; Nexium [Esomeprazole Magnesium]; Nitrofurantoin;  Nitrofurantoin Macrocrystal;  Nitrofurantoin Macrocrystalline;  Sulfa (Sulfonamide Antibiotics) Current Immunizations  Reviewed on 6/12/2015 Name Date Influenza Vaccine 10/18/2016, 10/2/2015, 10/1/2014, 9/1/2013 Influenza Vaccine (Quad) Mdck Pf 10/3/2017 Pneumococcal Conjugate (PCV-13) 8/24/2016 Pneumococcal Vaccine (Unspecified Type) 1/1/2011 Zoster Vaccine, Live 9/24/2015 Discharge Instructions Discharge Instructions HEART CATHETERIZATION/ANGIOGRAPHY DISCHARGE INSTRUCTIONS 1. Check puncture site frequently for swelling or bleeding. If there is any bleeding, lie down and apply pressure over the area with a clean towel or washcloth. Notify your doctor for any redness, swelling, drainage, or oozing from the puncture site. Notify your doctor for any fever or chills. 2. If the extremity becomes cold, numb, or painful call Dr. Emi Dubon at [phone]. 
3. Activity should be limited for the next 48 hours. Do not use your left arm for lifting, pushing, or pulling during this time. 4. You may resume your usual diet. Drink more fluids than usual. 
5. Have a responsible person drive you home and stay with you for at least 24 hours after your heart catheterization/angiography. 6. You may remove bandage from your Left arm in 24 hours. You may shower in 24 hours. No tub baths, hot tubs, or swimming for 1 week.  Do not place any lotions, creams, powders, or ointments over puncture site for 1 week. You may place a clean band-aid over the puncture site each day for 5 days. Change daily. 7. Do not drive for 24 hours. I have read the above instructions and have had the opportunity to ask questions. Patient: ________________________   Date: 3/9/2018 Witness: _______________________   Date: 3/9/2018 Discharge Orders None  
  
` Patient Signature:  ____________________________________________________________ Date:  ____________________________________________________________  
  
 Benny Lion Provider Signature:  ____________________________________________________________ Date:  ____________________________________________________________

## 2018-03-09 NOTE — PROGRESS NOTES
TRANSFER - OUT REPORT:    Verbal report given to Patrick Kuar RN (name) on Eric Nix  being [place] [address] [place] [address] [place] [place]       Report consisted of patients Situation, Background, Assessment and   Recommendations(SBAR). Information from the following report(s) SBAR was reviewed with the receiving nurse. Lines:   Peripheral IV 03/09/18 Right Antecubital (Active)       Peripheral IV 03/09/18 Left Antecubital (Active)        Opportunity for questions and clarification was provided. Patient transported with:   Tech         Pt had L/RHC via LBV and LRA. 7fr sheath remains in place to LBV. LRA site closed with TR band and 13mL @ 0930. Pt received Versed 4mg IV.

## 2018-03-09 NOTE — PROGRESS NOTES
Patient received to [address] [place] [address] room # 10  Ambulatory from [facility]. Patient scheduled for C today with Dr Danni Diana. Procedure reviewed & questions answered, voiced good understanding consent obtained & placed on chart. All medications and medical history reviewed. Will prep patient per orders. Patient & family updated on plan of care.

## 2018-03-09 NOTE — PROCEDURES
Brief Cardiac Procedure Note    Patient: Lissette Farr MRN: [id number]  SSN: [id number]    YOB: 1940  Age: 68 y.o. Sex: female      Date of Procedure: 3/9/2018     Pre-procedure Diagnosis: Chest pain CCS Class III    Post-procedure Diagnosis: Coronary Artery Disease    Procedure: Right and Left Heart Catheterization    Brief Description of Procedure: via lcv and lra    Performed By: Ke Carrillo MD     Assistants:     Anesthesia: Moderate Sedation    Estimated Blood Loss: Less than 10 mL      Specimens: None    Implants: None    Findings:   nml ef  Mild cad  PAH    Complications: None    Recommendations: Continue medical therapy.     Signed By: Ke Carrillo MD     March 9, 2018

## 2018-03-09 NOTE — PROGRESS NOTES
Patient took 81mg last Friday, March 2. Per Dr. Mabel Painter, patient instructed not to take 4 baby aspirin on day of heart cath.

## 2018-03-09 NOTE — PROGRESS NOTES
Discharged to home accompanied by friend. Left unit via wheelchair. Right radial and brachial sites without bleeding or hematoma.

## 2018-03-09 NOTE — DISCHARGE INSTRUCTIONS
HEART CATHETERIZATION/ANGIOGRAPHY DISCHARGE INSTRUCTIONS    1. Check puncture site frequently for swelling or bleeding. If there is any bleeding, lie down and apply pressure over the area with a clean towel or washcloth. Notify your doctor for any redness, swelling, drainage, or oozing from the puncture site. Notify your doctor for any fever or chills. 2. If the extremity becomes cold, numb, or painful call Dr. Winsome Tomlin at [phone].  3. Activity should be limited for the next 48 hours. Do not use your left arm for lifting, pushing, or pulling during this time. 4. You may resume your usual diet. Drink more fluids than usual.  5. Have a responsible person drive you home and stay with you for at least 24 hours after your heart catheterization/angiography. 6. You may remove bandage from your Left arm in 24 hours. You may shower in 24 hours. No tub baths, hot tubs, or swimming for 1 week. Do not place any lotions, creams, powders, or ointments over puncture site for 1 week. You may place a clean band-aid over the puncture site each day for 5 days. Change daily. 7. Do not drive for 24 hours. I have read the above instructions and have had the opportunity to ask questions.       Patient: ________________________   Date: 3/9/2018    Witness: _______________________   Date: 3/9/2018

## 2018-03-09 NOTE — PROCEDURES
[address]    Pete Quinn  MR#: [id number]  : 1940  ACCOUNT #: [de-identified]   DATE OF SERVICE: 2018    PROCEDURE PERFORMED:  Left and right heart catheterization, with left ventriculography and coronary angiography. HISTORY:  This is a 66-year-old lady undergoing cardiac catheterization for evaluation of worsening chest pain, suspicious for new onset angina pectoris. She had a nuclear stress test, which showed inferolateral ischemia. Her past medical history is remarkable for pulmonary fibrosis and pulmonary hypertension. SHE HAS A RADIOCONTRAST ALLERGY. PROCEDURE:  A right heart catheterization to measure pulmonary artery pressure was performed via the left cephalic vein with a 7-Japanese, [place]-John catheter without difficulty. Left heart catheterization with left ventriculography and coronary angiography was carried out from the left radial artery with a 5-Japanese multipurpose and 3.5 left Braxton. She tolerated the procedure well. She had no signs of an allergic reaction. SEDATION:  Start time was 8:30. The procedure end time was 9:27. A total of 4 mg of Versed was administered. Delia Olmedo RN was the circulator. CONTRAST LOAD:  80 mL. FINDINGS:  The pulmonary artery pressure is 49/19, with a mean of 30. The pulmonary artery wedge pressure is 5 mmHg. The central aortic pressure is 120/70 mmHg. The end-diastolic pressure is 15 mmHg, with a preA of 5 mmHg. There was no gradient on pullback across the aortic valve. The overall left ventricular size is normal.  The wall motion is normal.  The ejection fraction is 61%. On fluoroscopy, there is calcification of the left and right coronaries appreciated. Coronary angiography reveals a normal left main. It divides into an LAD and left circumflex. The LAD has fairly heavy calcification in its proximal segment, but only mild atherosclerotic irregularity. It gives rise to a large major diagonal branch, which has mild disease. The middle and distal segments have mild atherosclerotic disease. The left circumflex is a moderate-sized system with mild disease. The right coronary artery has calcification. There is mild narrowing in the proximal segment. There is mild irregularity in the middle segment. The distal vessel shows a large posterior descending branch, which has mild proximal disease. IMPRESSION:  1. Normal left ventricular systolic function. 2.  Pulmonary hypertension. 3.  Nonobstructive coronary disease. PLAN:  Continue medical therapy. MD ELIZABETH Watts / ANABELLE  D: 03/09/2018 09:48     T: 03/09/2018 10:14  JOB #: [id number]  CC: Austin Rivera MD  PULMONARY AND CRITICAL CARE  [address] [place] [address]   [address] [place] [address], [address] [place] [address]

## 2018-03-09 NOTE — PROGRESS NOTES
Discharge instructions reviewed with patient. Verbalizes understanding. Written instructions given. Right radial band removed after deflation completed. No bleeding or hematoma. Site redressed with sterile gauze covered with tegaderm.

## 2018-03-09 NOTE — PROGRESS NOTES
7 Vietnamese venous sheath removed from left brachial vein and pressure held for 20 minutes. No bleeding or hematoma. Site redressed with sterile gauze covered with tegaderm. Armboard placed for immobilization, secured with coban.

## 2018-04-10 ENCOUNTER — HOSPITAL ENCOUNTER (OUTPATIENT)
Dept: LAB | Age: 78
Discharge: HOME | End: 2018-04-10
Payer: MEDICARE

## 2018-04-10 DIAGNOSIS — I25.10 CORONARY ARTERY DISEASE INVOLVING NATIVE CORONARY ARTERY OF NATIVE HEART WITHOUT ANGINA PECTORIS: Chronic | ICD-10-CM

## 2018-04-10 DIAGNOSIS — E78.2 MIXED HYPERLIPIDEMIA: Chronic | ICD-10-CM

## 2018-04-10 DIAGNOSIS — E55.9 HYPOVITAMINOSIS D: Chronic | ICD-10-CM

## 2018-04-10 DIAGNOSIS — I10 ESSENTIAL HYPERTENSION: Chronic | ICD-10-CM

## 2018-04-10 DIAGNOSIS — E03.9 ACQUIRED HYPOTHYROIDISM: Chronic | ICD-10-CM

## 2018-04-10 LAB
ALBUMIN SERPL-MCNC: 3.8 G/DL (ref 3.2–4.6)
ALBUMIN/GLOB SERPL: 0.9 {RATIO}
ALP SERPL-CCNC: 66 U/L (ref 50–136)
ALT SERPL-CCNC: 20 U/L (ref 12–65)
ANION GAP SERPL CALC-SCNC: 7 MMOL/L
AST SERPL-CCNC: 21 U/L (ref 15–37)
BASOPHILS # BLD: 0 K/UL (ref 0–0.2)
BASOPHILS NFR BLD: 1 % (ref 0–2)
BILIRUB SERPL-MCNC: 0.6 MG/DL (ref 0.2–1.1)
BUN SERPL-MCNC: 18 MG/DL (ref 8–23)
CALCIUM SERPL-MCNC: 10.3 MG/DL (ref 8.3–10.4)
CHLORIDE SERPL-SCNC: 100 MMOL/L (ref 98–107)
CHOLEST SERPL-MCNC: 177 MG/DL
CO2 SERPL-SCNC: 29 MMOL/L (ref 21–32)
CREAT SERPL-MCNC: 1 MG/DL (ref 0.6–1)
DIFFERENTIAL METHOD BLD: ABNORMAL
EOSINOPHIL # BLD: 0.2 K/UL (ref 0–0.8)
EOSINOPHIL NFR BLD: 3 % (ref 0.5–7.8)
ERYTHROCYTE [DISTWIDTH] IN BLOOD BY AUTOMATED COUNT: 13.6 % (ref 11.9–14.6)
GLOBULIN SER CALC-MCNC: 4.1 G/DL
GLUCOSE SERPL-MCNC: 97 MG/DL (ref 65–100)
HCT VFR BLD AUTO: 42.4 % (ref 35.8–46.3)
HDLC SERPL-MCNC: 66 MG/DL (ref 40–60)
HDLC SERPL: 2.7 {RATIO}
HGB BLD-MCNC: 14.1 G/DL (ref 11.7–15.4)
LDLC SERPL CALC-MCNC: 92.4 MG/DL
LIPID PROFILE,FLP: ABNORMAL
LYMPHOCYTES # BLD: 2 K/UL (ref 0.5–4.6)
LYMPHOCYTES NFR BLD: 32 % (ref 13–44)
MAGNESIUM SERPL-MCNC: 1.6 MG/DL (ref 1.8–2.4)
MCH RBC QN AUTO: 31.5 PG (ref 26.1–32.9)
MCHC RBC AUTO-ENTMCNC: 33.3 G/DL (ref 31.4–35)
MCV RBC AUTO: 94.6 FL (ref 79.6–97.8)
MONOCYTES # BLD: 0.7 K/UL (ref 0.1–1.3)
MONOCYTES NFR BLD: 12 % (ref 4–12)
NEUTS SEG # BLD: 3.2 K/UL (ref 1.7–8.2)
NEUTS SEG NFR BLD: 52 % (ref 43–78)
PLATELET # BLD AUTO: 229 K/UL (ref 150–450)
PMV BLD AUTO: 8.9 FL (ref 10.8–14.1)
POTASSIUM SERPL-SCNC: 3.7 MMOL/L (ref 3.5–5.1)
PROT SERPL-MCNC: 7.9 G/DL (ref 6.3–8.2)
RBC # BLD AUTO: 4.48 M/UL (ref 4.05–5.25)
SODIUM SERPL-SCNC: 136 MMOL/L (ref 136–145)
TRIGL SERPL-MCNC: 93 MG/DL (ref 35–150)
TSH SERPL DL<=0.005 MIU/L-ACNC: 0.77 UIU/ML (ref 0.36–3.74)
URATE SERPL-MCNC: 6.5 MG/DL (ref 2.6–6)
VLDLC SERPL CALC-MCNC: 18.6 MG/DL (ref 6–23)
WBC # BLD AUTO: 6.2 K/UL (ref 4.3–11.1)

## 2018-04-10 PROCEDURE — 82306 VITAMIN D 25 HYDROXY: CPT | Performed by: INTERNAL MEDICINE

## 2018-04-10 PROCEDURE — 80061 LIPID PANEL: CPT | Performed by: INTERNAL MEDICINE

## 2018-04-10 PROCEDURE — 85025 COMPLETE CBC W/AUTO DIFF WBC: CPT | Performed by: INTERNAL MEDICINE

## 2018-04-10 PROCEDURE — 36415 COLL VENOUS BLD VENIPUNCTURE: CPT | Performed by: INTERNAL MEDICINE

## 2018-04-10 PROCEDURE — 84443 ASSAY THYROID STIM HORMONE: CPT | Performed by: INTERNAL MEDICINE

## 2018-04-10 PROCEDURE — 84550 ASSAY OF BLOOD/URIC ACID: CPT | Performed by: INTERNAL MEDICINE

## 2018-04-10 PROCEDURE — 80053 COMPREHEN METABOLIC PANEL: CPT | Performed by: INTERNAL MEDICINE

## 2018-04-10 PROCEDURE — 83735 ASSAY OF MAGNESIUM: CPT | Performed by: INTERNAL MEDICINE

## 2018-04-11 LAB — 25(OH)D3+25(OH)D2 SERPL-MCNC: 43.3 NG/ML (ref 30–100)

## 2018-04-19 PROBLEM — I42.0 CARDIOMYOPATHY, DILATED (HCC): Chronic | Status: ACTIVE | Noted: 2017-11-14

## 2018-04-19 PROBLEM — M89.8X1 SHOULDER BLADE PAIN: Status: ACTIVE | Noted: 2018-04-19

## 2018-04-24 ENCOUNTER — HOSPITAL ENCOUNTER (OUTPATIENT)
Dept: PHYSICAL THERAPY | Age: 78
Discharge: HOME | End: 2018-04-24
Payer: MEDICARE

## 2018-04-24 DIAGNOSIS — M89.8X1 SHOULDER BLADE PAIN: ICD-10-CM

## 2018-04-24 PROCEDURE — G8984 CARRY CURRENT STATUS: HCPCS

## 2018-04-24 PROCEDURE — G8985 CARRY GOAL STATUS: HCPCS

## 2018-04-24 PROCEDURE — 97110 THERAPEUTIC EXERCISES: CPT

## 2018-04-24 PROCEDURE — 97162 PT EVAL MOD COMPLEX 30 MIN: CPT

## 2018-04-24 NOTE — PROGRESS NOTES
Ambulatory/Rehab Services H2 Model Falls Risk Assessment    Risk Factor Pts. ·   Confusion/Disorientation/Impulsivity  []    4 ·   Symptomatic Depression  []   2 ·   Altered Elimination  [x]   1 ·   Dizziness/Vertigo  []   1 ·   Gender (Male)  []   1 ·   Any administered antiepileptics (anticonvulsants):  []   2 ·   Any administered benzodiazepines:  []   1 ·   Visual Impairment (specify):  []   1 ·   Portable Oxygen Use  []   1 ·   Orthostatic ? BP  []   1 ·   History of Recent Falls (within 3 mos.)  []   5     Ability to Rise from Chair (choose one) Pts. ·   Ability to rise in a single movement  [x]   0 ·   Pushes up, successful in one attempt  []   1 ·   Multiple attempts, but successful  []   3 ·   Unable to rise without assistance  []   4   Total: (5 or greater = High Risk) 1     Falls Prevention Plan:   []                Physical Limitations to Exercise (specify):   []                Mobility Assistance Device (type):   []                Exercise/Equipment Adaptation (specify):    ©2010 [facility] [address] Patent #[id number].  Federal Law prohibits the replication, distribution or use without written permission from [facility] [employer]

## 2018-04-24 NOTE — THERAPY EVALUATION
Leesa Reyes  : [address]  [address] [place] [address].  [phone]   [phone]        OUTPATIENT PHYSICAL THERAPY:Initial Assessment 2018        ICD-10: Treatment Diagnosis: Pain in left shoulder (M25.512)  Precautions/Allergies: Other food; Esomeprazole magnesium; Iodinated contrast- oral and iv dye; Iodine and iodide containing products; Shellfish derived; Amitriptyline; Amlodipine; Amoxicillin; Benicar [olmesartan]; Bystolic [nebivolol]; Flecainide; Hydrochlorothiazide; Ibuprofen; Iodine; Keflex [cephalexin]; Levofloxacin (bulk); Lisinopril; Lisinopril-hydrochlorothiazide; Nexium [esomeprazole magnesium]; Nitrofurantoin; Nitrofurantoin macrocrystal; Nitrofurantoin macrocrystalline; and Sulfa (sulfonamide antibiotics)   Fall Risk Score: 1 (? 5 = High Risk)  MD Orders: Evaluate and treat MEDICAL/REFERRING DIAGNOSIS:  Shoulder blade pain [M89.8X1]  DATE OF ONSET: 18  REFERRING PHYSICIAN: Terri Weiss MD  RETURN PHYSICIAN APPOINTMENT: To be determined      INITIAL ASSESSMENT:  Ms. Bernice Blanco presents with L UE pain in shoulder that extends to elbow with radicular symptoms into first 2 fingers, limited shoulder ROM, and weakness secondary to pain which is limiting ability to reach overhead and behind back which restricts ability to fully participate in ADLs such as reaching a shelf overhead, dressing such as pulling shirt on, and toileting self-care activities. Pt is a good candidate for skilled PT in order to improve symptoms, strength, and ROM in order to return to prior level of function. PROBLEM LIST (Impacting functional limitations):  1. Decreased Strength  2. Decreased ADL/Functional Activities  3. Increased Pain  4. Decreased Activity Tolerance  5. Decreased Flexibility/Joint Mobility INTERVENTIONS PLANNED:  1. Cryotherapy  2. Electrical Stimulation  3. Heat  4. Home Exercise Program (HEP)  5.  Manual Therapy  6. Neuromuscular Re-education/Strengthening  7. Range of Motion (ROM)  8. Therapeutic Activites  9. Therapeutic Exercise/Strengthening   TREATMENT PLAN:  Effective Dates: 4/24/18 TO 6/5/2018. Frequency/Duration: 2 times a week for 6 weeks  GOALS: (Goals have been discussed and agreed upon with patient.)  Short-Term Functional Goals: Time Frame: 3 weeks  1. Pt will report compliance with home program in order to improve strength and ROM. 2. Pt will improve active elevation in L UE to at least 90 ° to improve ability with reaching ADLs. 3. Pt will improve pain to be able to sleep through night. Discharge Goals: Time Frame: 6 weeks  1. Pt will be able to elevated L UE to 140 ° to be able to reach overhead for ADLs such as dressing. 2. Pt will be able to reach behind back to be able to perform self-care ADLs. 3. Pt will improve strength to 5/5 for all shoulder motions to be able to perform ADLs requiring object manipulation liking placing item into shelf and carrying. Rehabilitation Potential For Stated Goals: Good  Regarding Andreia Rivera's therapy, I certify that the treatment plan above will be carried out by a therapist or under their direction. Thank you for this referral,  Michael Trejo DPT     Referring Physician Signature: Hardik Holland MD              Date                      HISTORY:   History of Present Injury/Illness (Reason for Referral):  Pt reports that she was working in her garden 2 weeks ago and this vigorous activity aggravated her shoulder. It has been getting worse ever since. She reports she has chronic diarrhea and that reaching behind her for toileting self-care activities is painful in L arm, regardless of arm used. She reports pain in entire L upper quarter and radicular symptoms sometimes into first two fingers.    Past Medical History/Comorbidities:   Ms. Sandra Muñoz  has a past medical history of Acute on chronic combined systolic and diastolic heart failure ([address] (6/11/2015); Acute renal failure (ARF) ([address] (6/13/2015); Adiposity (11/10/2015); Arthritis (4/9/2014); Bilateral carotid artery stenosis; CAD (coronary artery disease) (4/9/2014); Chronic kidney disease; Coagulation disorder ([address] Congestive heart failure (CHF) ([address] Dyspnea (6/11/2015); Dyspnea on exertion (4/9/2014); GERD (gastroesophageal reflux disease); History of ITP; Hyperlipidemia (4/9/2014); Hypertension (4/9/2014); Hyponatremia (6/11/2015); Hypothyroid; Hypothyroidism (6/11/2015); Hypoxemia (7/16/2014); Left atrial dilation; Liver disease; Lung nodule (4/9/2014); MI (myocardial infarction) ([address] Nausea and vomiting (6/13/2016); Other ill-defined conditions(799.89); Pulmonary fibrosis ([address] (4/9/2014); Pulmonary hypertension ([address] Pulmonary valve regurgitation; PVC (premature ventricular contraction) (4/25/2014); Raynaud's disease (4/9/2014); Right atrial dilation; Severe tricuspid regurgitation; Sicca syndrome, Sjogren's ([address] (4/9/2015); and Thrombocytopenia ([address] (10/12/2015). She also has no past medical history of Adverse effect of anesthesia; Difficult intubation; Malignant hyperthermia due to anesthesia; Pseudocholinesterase deficiency; or Unspecified adverse effect of anesthesia. Ms. Yeyo Galvez  has a past surgical history that includes hx hysterectomy; hx appendectomy; hx tonsil and adenoidectomy; hx orthopaedic; hx hernia repair; hx colonoscopy; and pr colonoscopy w/biopsy single/multiple (12/13/2013). Social History/Living Environment:     Pt lives at home with her significant other and has a walk-in shower. Prior Level of Function/Work/Activity:  Pt does not work but is independent with all ADLs prior to injury. She reports she likes to workout on exercise machine 20 min 4x/week.    Dominant Side:         RIGHT  Current Medications:    Current Outpatient Prescriptions:     magnesium oxide (MAG-OX) 400 mg tablet, Take 1 Tab by mouth two (2) times a day., Disp: 180 Tab, Rfl: 3   ketoprofen, micronized 10 % cmap, by Apply Externally route., Disp: , Rfl:     zaleplon (SONATA) 10 mg capsule, Take 1 Cap by mouth nightly as needed. Max Daily Amount: 10 mg., Disp: 30 Cap, Rfl: 5    cholecalciferol, vitamin D3, 2,000 unit tab, Take 1 Tab by mouth every morning., Disp: 90 Tab, Rfl: 3    OTHER, Ketoprofen 10%. Apply 3 to 4 times daily  Compounded cream RX # [id number], Disp: 60 g, Rfl: prn    ondansetron (ZOFRAN ODT) 4 mg disintegrating tablet, Take 1 Tab by mouth every six (6) hours as needed for Nausea., Disp: 15 Tab, Rfl: 0    furosemide (LASIX) 20 mg tablet, Take 1 Tab by mouth daily. , Disp: 90 Tab, Rfl: 3    vit a,c & e-lutein-minerals (HEALTHY EYES) tablet, Take 2 Tabs by mouth daily. thera tears, Disp: , Rfl:     estradiol (ESTRACE) 0.5 mg tablet, Take 1 Tab by mouth daily. , Disp: 90 Tab, Rfl: 3    levothyroxine (SYNTHROID) 112 mcg tablet, Take 1 Tab by mouth Daily (before breakfast). Indications: hypothyroidism, Disp: 90 Tab, Rfl: 3    Omega-3-DHA-EPA-Fish Oil (FISH OIL) 1,000 mg (120 mg-180 mg) cap, Take  by mouth., Disp: , Rfl:     MILK THISTLE PO, Take  by mouth., Disp: , Rfl:     therapeutic multivitamin (THERAGRAN) tablet, Take 1 Tab by mouth daily. , Disp: , Rfl:     tadalafil (ADCIRCA) 20 mg tablet, Take 20 mg by mouth two (2) times a day. For lungs, Disp: , Rfl:     spironolactone (ALDACTONE) 25 mg tablet, Take 12.5 mg by mouth every morning., Disp: , Rfl:     OXYGEN-AIR DELIVERY SYSTEMS, 2 l/m continuous  Indications: 2 l/m continuous, Disp: , Rfl:     B.infantis-B.ani-B.long-B.bifi (PROBIOTIC 4X) 10-15 mg TbEC, Take  by mouth every morning., Disp: , Rfl:    Date Last Reviewed:  4/24/2018   # of Personal Factors/Comorbidities that affect the Plan of Care: 1-2: MODERATE COMPLEXITY   EXAMINATION:   Observation/Orthostatic Postural Assessment: Forward head posture. Palpation:          Very tender to palpation in L paraspinals in interscapular region and L UT. ROM:     R UE elevation: full overhead movement  L active flexion: 47 ° limited by pain  L active abduction: 40 ° limited by pain  Passive IR full, passive ER painful   Cervical AROM normal for flexion, extension, L SB, and R rotation. Painful in L UE with R SB and L rotation though R SB not painful after relaxing. Cervical PROM: muscle guarding and spasm cause pain with R SB but once relaxed, no pain. R trunk active rotation very painful while L rotation not painful. Strength:     R UE 5/5 for all motions  L UE 5/5 for elbow flexion, extension, and IR  Shoulder flexion, abduction, and ER all painful and limited       Special Tests:          ULTT (+) for radial and median nerve bias  Lift off test painful, but able to perform. Neurological Screen:        Sensation: pt reports no abnormalities with light touch screen. Functional Mobility:         Gait/Ambulation:  WNL        Sit to stand transfer: WNL  Balance:          Not tested    Body Structures Involved:  1. Nerves  2. Joints  3. Muscles  4. Ligaments Body Functions Affected:  1. Sensory/Pain  2. Neuromusculoskeletal  3. Movement Related Activities and Participation Affected:  1. Learning and Applying Knowledge  2. General Tasks and Demands  3. Mobility  4. Self Care  5. Domestic Life  6. Interpersonal Interactions and Relationships  7. Community, Social and [place]   # of elements that affect the Plan of Care: 4+: HIGH COMPLEXITY   CLINICAL PRESENTATION:   Presentation: Evolving clinical presentation with unstable and unpredictable characteristics: HIGH COMPLEXITY   CLINICAL DECISION MAKING:   Outcome Measure: Tool Used: Disabilities of the Arm, Shoulder and Hand (DASH) Questionnaire - Quick Version  Score:  Initial: 44/55  Most Recent: X/55 (Date: -- )   Interpretation of Score: The DASH is designed to measure the activities of daily living in person's with upper extremity dysfunction or pain.   Each section is scored on a 1-5 scale, 5 representing the greatest disability. The scores of each section are added together for a total score of 55. Score 11 12-19 20-28 29-37 38-45 46-54 55   Modifier CH CI CJ CK CL CM CN     ? Carrying, Moving, and Handling Objects:     - CURRENT STATUS: CL - 60%-79% impaired, limited or restricted    - GOAL STATUS: CI - 1%-19% impaired, limited or restricted    - D/C STATUS:  ---------------To be determined---------------      Medical Necessity:   · Patient is expected to demonstrate progress in strength and range of motion to increase independence with ADLs. Reason for Services/Other Comments:  · Patient has tolerated an increase in activity as demonstrated by: increased resistance/repetitions during therapeutic exercise. Use of outcome tool(s) and clinical judgement create a POC that gives a: Questionable prediction of patient's progress: MODERATE COMPLEXITY   TREATMENT:   (In addition to Assessment/Re-Assessment sessions the following treatments were rendered)  THERAPEUTIC ACTIVITY: (See below for minutes): Therapeutic activities per grid below to improve mobility and strength. Required minimal visual, verbal, manual and tactile cues to promote coordination of left, upper extremity(s). THERAPEUTIC EXERCISE: (See below for minutes):  Exercises per grid below to improve mobility and strength. Required minimal visual, verbal, manual and tactile cues to promote proper body alignment, promote proper body posture and promote proper body mechanics. Progressed resistance, range, repetitions and complexity of movement as indicated. NEUROMUSCULAR RE-EDUCATION: (See below for minutes):  Exercise/activities per grid below to improve balance and posture. Required minimal visual, verbal, manual and tactile cues to promote coordination of left, upper extremity(s).   MANUAL THERAPY: (See below for minutes): Joint mobilization, Soft tissue mobilization, Manipulation and Manual lymphatic drainage was utilized and necessary because of the patient's restricted joint motion, painful spasm, loss of articular motion and restricted motion of soft tissue. MODALITIES: (See below for minutes)    Date: 4/24/18 (visit 1)       Modalities:                                Therapeutic Exercise: 10 min       S/L shoulder ER Educated on form. Instructed to keep in pain-free region. S/L shoulder abduction Educated on form. Instructed to keep in pain-free region. Radial nerve glide Educated on form. Instructed to keep in pain-free region. Manual Therapy:                        Therapeutic Activities:                                        HEP: See above chart. [employer] [facility]  Treatment/Session Assessment:  Pt confirmed understanding of exercises. She demonstrated a high amount of muscle guarding which resulted in painful movement due to muscular tension which decreased pain once relaxed. · Pain/ Symptoms: Initial:   9/10 with movement. Post Session:  Pt reported no increase in symptoms following today's session. ·   Compliance with Program/Exercises: Will assess as treatment progresses. · Recommendations/Intent for next treatment session: \"Next visit will focus on advancements to more challenging activities\".   Total Treatment Duration:  PT Patient Time In/Time Out  Time In: 0930  Time Out: [address] [facility]

## 2018-04-27 ENCOUNTER — HOSPITAL ENCOUNTER (OUTPATIENT)
Dept: PHYSICAL THERAPY | Age: 78
Discharge: HOME | End: 2018-04-27
Payer: MEDICARE

## 2018-04-27 PROCEDURE — 97140 MANUAL THERAPY 1/> REGIONS: CPT

## 2018-04-27 PROCEDURE — 97110 THERAPEUTIC EXERCISES: CPT

## 2018-04-27 NOTE — PROGRESS NOTES
Misty Beth  : [id number] [facility],[address]  [address] [place] [address].  [phone]   [phone]        OUTPATIENT PHYSICAL THERAPY:Daily Note 2018        ICD-10: Treatment Diagnosis: Pain in left shoulder (M25.512)  Precautions/Allergies: Other food; Esomeprazole magnesium; Iodinated contrast- oral and iv dye; Iodine and iodide containing products; Shellfish derived; Amitriptyline; Amlodipine; Amoxicillin; Benicar [olmesartan]; Bystolic [nebivolol]; Flecainide; Hydrochlorothiazide; Ibuprofen; Iodine; Keflex [cephalexin]; Levofloxacin (bulk); Lisinopril; Lisinopril-hydrochlorothiazide; Nexium [esomeprazole magnesium]; Nitrofurantoin; Nitrofurantoin macrocrystal; Nitrofurantoin macrocrystalline; and Sulfa (sulfonamide antibiotics)   Fall Risk Score: 1 (? 5 = High Risk)  MD Orders: Evaluate and treat MEDICAL/REFERRING DIAGNOSIS:  Other specified disorders of bone, shoulder [M89.8X1]  DATE OF ONSET: 18  REFERRING PHYSICIAN: Lynn Marshall MD  RETURN PHYSICIAN APPOINTMENT: To be determined      INITIAL ASSESSMENT:  Ms. Christy Cornejo presents with L UE pain in shoulder that extends to elbow with radicular symptoms into first 2 fingers, limited shoulder ROM, and weakness secondary to pain which is limiting ability to reach overhead and behind back which restricts ability to fully participate in ADLs such as reaching a shelf overhead, dressing such as pulling shirt on, and toileting self-care activities. Pt is a good candidate for skilled PT in order to improve symptoms, strength, and ROM in order to return to prior level of function. PROBLEM LIST (Impacting functional limitations):  1. Decreased Strength  2. Decreased ADL/Functional Activities  3. Increased Pain  4. Decreased Activity Tolerance  5. Decreased Flexibility/Joint Mobility INTERVENTIONS PLANNED:  1. Cryotherapy  2. Electrical Stimulation  3. Heat  4.  Home Exercise Program (HEP)  5. Manual Therapy  6. Neuromuscular Re-education/Strengthening  7. Range of Motion (ROM)  8. Therapeutic Activites  9. Therapeutic Exercise/Strengthening   TREATMENT PLAN:  Effective Dates: 4/24/18 TO 6/5/2018. Frequency/Duration: 2 times a week for 6 weeks  GOALS: (Goals have been discussed and agreed upon with patient.)  Short-Term Functional Goals: Time Frame: 3 weeks  1. Pt will report compliance with home program in order to improve strength and ROM. 2. Pt will improve active elevation in L UE to at least 90 ° to improve ability with reaching ADLs. 3. Pt will improve pain to be able to sleep through night. Discharge Goals: Time Frame: 6 weeks  1. Pt will be able to elevated L UE to 140 ° to be able to reach overhead for ADLs such as dressing. 2. Pt will be able to reach behind back to be able to perform self-care ADLs. 3. Pt will improve strength to 5/5 for all shoulder motions to be able to perform ADLs requiring object manipulation liking placing item into shelf and carrying. Rehabilitation Potential For Stated Goals: Good                HISTORY:   History of Present Injury/Illness (Reason for Referral):  Pt reports that she was working in her garden 2 weeks ago and this vigorous activity aggravated her shoulder. It has been getting worse ever since. She reports she has chronic diarrhea and that reaching behind her for toileting self-care activities is painful in L arm, regardless of arm used. She reports pain in entire L upper quarter and radicular symptoms sometimes into first two fingers. Past Medical History/Comorbidities:   Ms. Serenity Ramon  has a past medical history of Acute on chronic combined systolic and diastolic heart failure ([address] (6/11/2015); Acute renal failure (ARF) ([address] (6/13/2015); Adiposity (11/10/2015); Arthritis (4/9/2014); Bilateral carotid artery stenosis; CAD (coronary artery disease) (4/9/2014); Chronic kidney disease; Coagulation disorder ([address]  Congestive heart failure (CHF) ([facility] Dyspnea (6/11/2015); Dyspnea on exertion (4/9/2014); GERD (gastroesophageal reflux disease); History of ITP; Hyperlipidemia (4/9/2014); Hypertension (4/9/2014); Hyponatremia (6/11/2015); Hypothyroid; Hypothyroidism (6/11/2015); Hypoxemia (7/16/2014); Left atrial dilation; Liver disease; Lung nodule (4/9/2014); MI (myocardial infarction) ([facility] Nausea and vomiting (6/13/2016); Other ill-defined conditions(799.89); Pulmonary fibrosis ([facility] (4/9/2014); Pulmonary hypertension ([facility] Pulmonary valve regurgitation; PVC (premature ventricular contraction) (4/25/2014); Raynaud's disease (4/9/2014); Right atrial dilation; Severe tricuspid regurgitation; Sicca syndrome, Sjogren's ([facility] (4/9/2015); and Thrombocytopenia ([facility] (10/12/2015). She also has no past medical history of Adverse effect of anesthesia; Difficult intubation; Malignant hyperthermia due to anesthesia; Pseudocholinesterase deficiency; or Unspecified adverse effect of anesthesia. Ms. Scott Lopez  has a past surgical history that includes hx hysterectomy; hx appendectomy; hx tonsil and adenoidectomy; hx orthopaedic; hx hernia repair; hx colonoscopy; and pr colonoscopy w/biopsy single/multiple (12/13/2013). Social History/Living Environment:     Pt lives at home with her significant other and has a walk-in shower. Prior Level of Function/Work/Activity:  Pt does not work but is independent with all ADLs prior to injury. She reports she likes to workout on exercise machine 20 min 4x/week. Dominant Side:         RIGHT  Current Medications:    Current Outpatient Prescriptions:     magnesium oxide (MAG-OX) 400 mg tablet, Take 1 Tab by mouth two (2) times a day., Disp: 180 Tab, Rfl: 3    ketoprofen, micronized 10 % cmap, by Apply Externally route., Disp: , Rfl:     zaleplon (SONATA) 10 mg capsule, Take 1 Cap by mouth nightly as needed.  Max Daily Amount: 10 mg., Disp: 30 Cap, Rfl: 5    cholecalciferol, vitamin D3, 2,000 unit tab, Take 1 Tab by mouth every morning., Disp: 90 Tab, Rfl: 3    OTHER, Ketoprofen 10%. Apply 3 to 4 times daily  Compounded cream RX # [id number], Disp: 60 g, Rfl: prn    ondansetron (ZOFRAN ODT) 4 mg disintegrating tablet, Take 1 Tab by mouth every six (6) hours as needed for Nausea., Disp: 15 Tab, Rfl: 0    furosemide (LASIX) 20 mg tablet, Take 1 Tab by mouth daily. , Disp: 90 Tab, Rfl: 3    vit a,c & e-lutein-minerals (HEALTHY EYES) tablet, Take 2 Tabs by mouth daily. thera tears, Disp: , Rfl:     estradiol (ESTRACE) 0.5 mg tablet, Take 1 Tab by mouth daily. , Disp: 90 Tab, Rfl: 3    levothyroxine (SYNTHROID) 112 mcg tablet, Take 1 Tab by mouth Daily (before breakfast). Indications: hypothyroidism, Disp: 90 Tab, Rfl: 3    Omega-3-DHA-EPA-Fish Oil (FISH OIL) 1,000 mg (120 mg-180 mg) cap, Take  by mouth., Disp: , Rfl:     MILK THISTLE PO, Take  by mouth., Disp: , Rfl:     therapeutic multivitamin (THERAGRAN) tablet, Take 1 Tab by mouth daily. , Disp: , Rfl:     tadalafil (ADCIRCA) 20 mg tablet, Take 20 mg by mouth two (2) times a day. For lungs, Disp: , Rfl:     spironolactone (ALDACTONE) 25 mg tablet, Take 12.5 mg by mouth every morning., Disp: , Rfl:     OXYGEN-AIR DELIVERY SYSTEMS, 2 l/m continuous  Indications: 2 l/m continuous, Disp: , Rfl:     B.infantis-B.ani-B.long-B.bifi (PROBIOTIC 4X) 10-15 mg TbEC, Take  by mouth every morning., Disp: , Rfl:    Date Last Reviewed:  4/27/2018   EXAMINATION:   Observation/Orthostatic Postural Assessment: Forward head posture. Palpation:          Very tender to palpation in L paraspinals in interscapular region and L UT.   ROM:     R UE elevation: full overhead movement  L active flexion: 47 ° limited by pain  L active abduction: 40 ° limited by pain  Passive IR full, passive ER painful   Cervical AROM normal for flexion, extension, L SB, and R rotation. Painful in L UE with R SB and L rotation though R SB not painful after relaxing.   Cervical PROM: muscle guarding and spasm cause pain with R SB but once relaxed, no pain. R trunk active rotation very painful while L rotation not painful. Strength:     R UE 5/5 for all motions  L UE 5/5 for elbow flexion, extension, and IR  Shoulder flexion, abduction, and ER all painful and limited       Special Tests:          ULTT (+) for radial and median nerve bias  Lift off test painful, but able to perform. Neurological Screen:        Sensation: pt reports no abnormalities with light touch screen. Functional Mobility:         Gait/Ambulation:  WNL        Sit to stand transfer: WNL  Balance:          Not tested    Body Structures Involved:  1. Nerves  2. Joints  3. Muscles  4. Ligaments Body Functions Affected:  1. Sensory/Pain  2. Neuromusculoskeletal  3. Movement Related Activities and Participation Affected:  1. Learning and Applying Knowledge  2. General Tasks and Demands  3. Mobility  4. Self Care  5. Domestic Life  6. Interpersonal Interactions and Relationships  7. Community, Social and Civic Life   CLINICAL PRESENTATION:   CLINICAL DECISION MAKING:   Outcome Measure: Tool Used: Disabilities of the Arm, Shoulder and Hand (DASH) Questionnaire - Quick Version  Score:  Initial: 44/55  Most Recent: X/55 (Date: -- )   Interpretation of Score: The DASH is designed to measure the activities of daily living in person's with upper extremity dysfunction or pain. Each section is scored on a 1-5 scale, 5 representing the greatest disability. The scores of each section are added together for a total score of 55. Score 11 12-19 20-28 29-37 38-45 46-54 55   Modifier CH CI CJ CK CL CM CN     ?  Carrying, Moving, and Handling Objects:     - CURRENT STATUS: CL - 60%-79% impaired, limited or restricted    - GOAL STATUS: CI - 1%-19% impaired, limited or restricted    - D/C STATUS:  ---------------To be determined---------------      Medical Necessity:   · Patient is expected to demonstrate progress in strength and range of motion to increase independence with ADLs. Reason for Services/Other Comments:  · Patient has tolerated an increase in activity as demonstrated by: increased resistance/repetitions during therapeutic exercise. TREATMENT:   (In addition to Assessment/Re-Assessment sessions the following treatments were rendered)  THERAPEUTIC ACTIVITY: (See below for minutes): Therapeutic activities per grid below to improve mobility and strength. Required minimal visual, verbal, manual and tactile cues to promote coordination of left, upper extremity(s). THERAPEUTIC EXERCISE: (See below for minutes):  Exercises per grid below to improve mobility and strength. Required minimal visual, verbal, manual and tactile cues to promote proper body alignment, promote proper body posture and promote proper body mechanics. Progressed resistance, range, repetitions and complexity of movement as indicated. NEUROMUSCULAR RE-EDUCATION: (See below for minutes):  Exercise/activities per grid below to improve balance and posture. Required minimal visual, verbal, manual and tactile cues to promote coordination of left, upper extremity(s). MANUAL THERAPY: (See below for minutes): Joint mobilization, Soft tissue mobilization, Manipulation and Manual lymphatic drainage was utilized and necessary because of the patient's restricted joint motion, painful spasm, loss of articular motion and restricted motion of soft tissue. MODALITIES: (See below for minutes)    Date: 4/24/18 (visit 1) 4/27/18 (visit 2)      Modalities:                                Therapeutic Exercise: 10 min 15 min      S/L shoulder ER Educated on form. Instructed to keep in pain-free region. 2x10      S/L shoulder flexion Educated on form. Instructed to keep in pain-free region. 2x10      Radial nerve glide Educated on form. Instructed to keep in pain-free region.  2x10      Manual stretching  PT assisted: B UT      Cervical active rotation  x10              Manual Therapy: 30 min        STM to L paraspinals    MET to R cervical extensors     L 1st rib mob Grade II              Therapeutic Activities:                                        HEP: See above chart. [employer] Portal    Shoulder elevation ROM: pt able to flex and abduct L UE to ~90 °, per visual estimation. Treatment/Session Assessment: Pt continues to demonstrate pain with certain movements such as active shoulder elevation and eccentric lowering, rotation to R with trunk and neck, and humeral IR + pronation. She reports pain that goes down arm into medial elbow consistent with adverse tension in ulnar nerve, but is reproduced with radial nerve test. She also presents with tenderness to palpation in infraspinatus, just inferior to spine of scapula at medial scapular border, which ischemic compression reproduces elbow pain. She also is tender along L cervical paraspinals, R thoracolumbar extensors, and L first rib. She demonstrated considerably improved active elevation today and was able to put dorsum of L hand on back without discomfort. Her symptoms seem to be vertebrogenic, at least in part, and are considerably provoked with R rotation. PT should address these impairments with exercises for L shoulder ROM and trunk rotation and manual therapy to improve symptoms with active movements. · Pain/ Symptoms: Initial:  0/10 at rest, 5/19 with certain movements. Pt reports that she feels like the exercises have been helping. Post Session:  Pt reported no increase in symptoms following today's session. ·   Compliance with Program/Exercises: Will assess as treatment progresses. · Recommendations/Intent for next treatment session: \"Next visit will focus on advancements to more challenging activities\".   Total Treatment Duration:  PT Patient Time In/Time Out  Time In: 0930  Time Out: [address] [facility], [facility]

## 2018-05-01 ENCOUNTER — HOSPITAL ENCOUNTER (OUTPATIENT)
Dept: PHYSICAL THERAPY | Age: 78
Discharge: HOME | End: 2018-05-01
Payer: MEDICARE

## 2018-05-01 PROCEDURE — 97110 THERAPEUTIC EXERCISES: CPT

## 2018-05-01 PROCEDURE — 97140 MANUAL THERAPY 1/> REGIONS: CPT

## 2018-05-01 NOTE — PROGRESS NOTES
Rubia Jacob  : 1940 [id number] [facility],[address]  [address] [place] [address].  [phone]   [phone]        OUTPATIENT PHYSICAL THERAPY:Daily Note 2018        ICD-10: Treatment Diagnosis: Pain in left shoulder (M25.512)  Precautions/Allergies: Other food; Esomeprazole magnesium; Iodinated contrast- oral and iv dye; Iodine and iodide containing products; Shellfish derived; Amitriptyline; Amlodipine; Amoxicillin; Benicar [olmesartan]; Bystolic [nebivolol]; Flecainide; Hydrochlorothiazide; Ibuprofen; Iodine; Keflex [cephalexin]; Levofloxacin (bulk); Lisinopril; Lisinopril-hydrochlorothiazide; Nexium [esomeprazole magnesium]; Nitrofurantoin; Nitrofurantoin macrocrystal; Nitrofurantoin macrocrystalline; and Sulfa (sulfonamide antibiotics)   Fall Risk Score: 1 (? 5 = High Risk)  MD Orders: Evaluate and treat MEDICAL/REFERRING DIAGNOSIS:  Other specified disorders of bone, shoulder [M89.8X1]  DATE OF ONSET: 18  REFERRING PHYSICIAN: Hilario Pope MD  RETURN PHYSICIAN APPOINTMENT: To be determined      INITIAL ASSESSMENT:  Ms. Sonia Rutledge presents with L UE pain in shoulder that extends to elbow with radicular symptoms into first 2 fingers, limited shoulder ROM, and weakness secondary to pain which is limiting ability to reach overhead and behind back which restricts ability to fully participate in ADLs such as reaching a shelf overhead, dressing such as pulling shirt on, and toileting self-care activities. Pt is a good candidate for skilled PT in order to improve symptoms, strength, and ROM in order to return to prior level of function. PROBLEM LIST (Impacting functional limitations):  1. Decreased Strength  2. Decreased ADL/Functional Activities  3. Increased Pain  4. Decreased Activity Tolerance  5. Decreased Flexibility/Joint Mobility INTERVENTIONS PLANNED:  1. Cryotherapy  2. Electrical Stimulation  3. Heat  4.  Home Exercise Program (HEP)  5. Manual Therapy  6. Neuromuscular Re-education/Strengthening  7. Range of Motion (ROM)  8. Therapeutic Activites  9. Therapeutic Exercise/Strengthening   TREATMENT PLAN:  Effective Dates: 4/24/18 TO 6/5/2018. Frequency/Duration: 2 times a week for 6 weeks  GOALS: (Goals have been discussed and agreed upon with patient.)  Short-Term Functional Goals: Time Frame: 3 weeks  1. Pt will report compliance with home program in order to improve strength and ROM. 2. Pt will improve active elevation in L UE to at least 90 ° to improve ability with reaching ADLs. 3. Pt will improve pain to be able to sleep through night. Discharge Goals: Time Frame: 6 weeks  1. Pt will be able to elevated L UE to 140 ° to be able to reach overhead for ADLs such as dressing. 2. Pt will be able to reach behind back to be able to perform self-care ADLs. 3. Pt will improve strength to 5/5 for all shoulder motions to be able to perform ADLs requiring object manipulation liking placing item into shelf and carrying. Rehabilitation Potential For Stated Goals: Good                HISTORY:   History of Present Injury/Illness (Reason for Referral):  Pt reports that she was working in her garden 2 weeks ago and this vigorous activity aggravated her shoulder. It has been getting worse ever since. She reports she has chronic diarrhea and that reaching behind her for toileting self-care activities is painful in L arm, regardless of arm used. She reports pain in entire L upper quarter and radicular symptoms sometimes into first two fingers. Past Medical History/Comorbidities:   Ms. Sunshine Diego  has a past medical history of Acute on chronic combined systolic and diastolic heart failure ([address] (6/11/2015); Acute renal failure (ARF) ([address] (6/13/2015); Adiposity (11/10/2015); Arthritis (4/9/2014); Bilateral carotid artery stenosis; CAD (coronary artery disease) (4/9/2014); Chronic kidney disease; Coagulation disorder ([address]  Congestive heart failure (CHF) ([facility] Dyspnea (6/11/2015); Dyspnea on exertion (4/9/2014); GERD (gastroesophageal reflux disease); History of ITP; Hyperlipidemia (4/9/2014); Hypertension (4/9/2014); Hyponatremia (6/11/2015); Hypothyroid; Hypothyroidism (6/11/2015); Hypoxemia (7/16/2014); Left atrial dilation; Liver disease; Lung nodule (4/9/2014); MI (myocardial infarction) ([facility] Nausea and vomiting (6/13/2016); Other ill-defined conditions(799.89); Pulmonary fibrosis ([facility] (4/9/2014); [facility] hypertension ([facility] Pulmonary valve regurgitation; PVC (premature ventricular contraction) (4/25/2014); Raynaud's disease (4/9/2014); Right atrial dilation; Severe tricuspid regurgitation; Sicca syndrome, Sjogren's ([facility] (4/9/2015); and Thrombocytopenia ([facility] (10/12/2015). She also has no past medical history of Adverse effect of anesthesia; Difficult intubation; Malignant hyperthermia due to anesthesia; Pseudocholinesterase deficiency; or Unspecified adverse effect of anesthesia. Ms. Mohini Acosta  has a past surgical history that includes hx hysterectomy; hx appendectomy; hx tonsil and adenoidectomy; hx orthopaedic; hx hernia repair; hx colonoscopy; and pr colonoscopy w/biopsy single/multiple (12/13/2013). Social History/Living Environment:     Pt lives at home with her significant other and has a walk-in shower. Prior Level of Function/Work/Activity:  Pt does not work but is independent with all ADLs prior to injury. She reports she likes to workout on exercise machine 20 min 4x/week. Dominant Side:         RIGHT  Current Medications:    Current Outpatient Prescriptions:     magnesium oxide (MAG-OX) 400 mg tablet, Take 1 Tab by mouth two (2) times a day., Disp: 180 Tab, Rfl: 3    ketoprofen, micronized 10 % cmap, by Apply Externally route., Disp: , Rfl:     zaleplon (SONATA) 10 mg capsule, Take 1 Cap by mouth nightly as needed.  Max Daily Amount: 10 mg., Disp: 30 Cap, Rfl: 5    cholecalciferol, vitamin D3, 2,000 unit tab, Take 1 Tab by mouth every morning., Disp: 90 Tab, Rfl: 3    OTHER, Ketoprofen 10%. Apply 3 to 4 times daily  Compounded cream RX # [id number], Disp: 60 g, Rfl: prn    ondansetron (ZOFRAN ODT) 4 mg disintegrating tablet, Take 1 Tab by mouth every six (6) hours as needed for Nausea., Disp: 15 Tab, Rfl: 0    furosemide (LASIX) 20 mg tablet, Take 1 Tab by mouth daily. , Disp: 90 Tab, Rfl: 3    vit a,c & e-lutein-minerals (HEALTHY EYES) tablet, Take 2 Tabs by mouth daily. thera tears, Disp: , Rfl:     estradiol (ESTRACE) 0.5 mg tablet, Take 1 Tab by mouth daily. , Disp: 90 Tab, Rfl: 3    levothyroxine (SYNTHROID) 112 mcg tablet, Take 1 Tab by mouth Daily (before breakfast). Indications: hypothyroidism, Disp: 90 Tab, Rfl: 3    Omega-3-DHA-EPA-Fish Oil (FISH OIL) 1,000 mg (120 mg-180 mg) cap, Take  by mouth., Disp: , Rfl:     MILK THISTLE PO, Take  by mouth., Disp: , Rfl:     therapeutic multivitamin (THERAGRAN) tablet, Take 1 Tab by mouth daily. , Disp: , Rfl:     tadalafil (ADCIRCA) 20 mg tablet, Take 20 mg by mouth two (2) times a day. For lungs, Disp: , Rfl:     spironolactone (ALDACTONE) 25 mg tablet, Take 12.5 mg by mouth every morning., Disp: , Rfl:     OXYGEN-AIR DELIVERY SYSTEMS, 2 l/m continuous  Indications: 2 l/m continuous, Disp: , Rfl:     B.infantis-B.ani-B.long-B.bifi (PROBIOTIC 4X) 10-15 mg TbEC, Take  by mouth every morning., Disp: , Rfl:    Date Last Reviewed:  5/1/2018   EXAMINATION:   Observation/Orthostatic Postural Assessment: Forward head posture. Palpation:          Very tender to palpation in L paraspinals in interscapular region and L UT.   ROM:     R UE elevation: full overhead movement  L active flexion: 47 ° limited by pain  L active abduction: 40 ° limited by pain  Passive IR full, passive ER painful   Cervical AROM normal for flexion, extension, L SB, and R rotation. Painful in L UE with R SB and L rotation though R SB not painful after relaxing.   Cervical PROM: muscle guarding and spasm cause pain with R SB but once relaxed, no pain. R trunk active rotation very painful while L rotation not painful. Strength:     R UE 5/5 for all motions  L UE 5/5 for elbow flexion, extension, and IR  Shoulder flexion, abduction, and ER all painful and limited       Special Tests:          ULTT (+) for radial and median nerve bias  Lift off test painful, but able to perform. Neurological Screen:        Sensation: pt reports no abnormalities with light touch screen. Functional Mobility:         Gait/Ambulation:  WNL        Sit to stand transfer: WNL  Balance:          Not tested    Body Structures Involved:  1. Nerves  2. Joints  3. Muscles  4. Ligaments Body Functions Affected:  1. Sensory/Pain  2. Neuromusculoskeletal  3. Movement Related Activities and Participation Affected:  1. Learning and Applying Knowledge  2. General Tasks and Demands  3. Mobility  4. Self Care  5. Domestic Life  6. Interpersonal Interactions and Relationships  7. Community, Social and Civic Life   CLINICAL PRESENTATION:   CLINICAL DECISION MAKING:   Outcome Measure: Tool Used: Disabilities of the Arm, Shoulder and Hand (DASH) Questionnaire - Quick Version  Score:  Initial: 44/55  Most Recent: X/55 (Date: -- )   Interpretation of Score: The DASH is designed to measure the activities of daily living in person's with upper extremity dysfunction or pain. Each section is scored on a 1-5 scale, 5 representing the greatest disability. The scores of each section are added together for a total score of 55. Score 11 12-19 20-28 29-37 38-45 46-54 55   Modifier CH CI CJ CK CL CM CN     ?  Carrying, Moving, and Handling Objects:     - CURRENT STATUS: CL - 60%-79% impaired, limited or restricted    - GOAL STATUS: CI - 1%-19% impaired, limited or restricted    - D/C STATUS:  ---------------To be determined---------------      Medical Necessity:   · Patient is expected to demonstrate progress in strength and range of motion to increase independence with ADLs. Reason for Services/Other Comments:  · Patient has tolerated an increase in activity as demonstrated by: increased resistance/repetitions during therapeutic exercise. TREATMENT:   (In addition to Assessment/Re-Assessment sessions the following treatments were rendered)  THERAPEUTIC ACTIVITY: (See below for minutes): Therapeutic activities per grid below to improve mobility and strength. Required minimal visual, verbal, manual and tactile cues to promote coordination of left, upper extremity(s). THERAPEUTIC EXERCISE: (See below for minutes):  Exercises per grid below to improve mobility and strength. Required minimal visual, verbal, manual and tactile cues to promote proper body alignment, promote proper body posture and promote proper body mechanics. Progressed resistance, range, repetitions and complexity of movement as indicated. NEUROMUSCULAR RE-EDUCATION: (See below for minutes):  Exercise/activities per grid below to improve balance and posture. Required minimal visual, verbal, manual and tactile cues to promote coordination of left, upper extremity(s). MANUAL THERAPY: (See below for minutes): Joint mobilization, Soft tissue mobilization, Manipulation and Manual lymphatic drainage was utilized and necessary because of the patient's restricted joint motion, painful spasm, loss of articular motion and restricted motion of soft tissue. MODALITIES: (See below for minutes)    Date: 4/24/18 (visit 1) 4/27/18 (visit 2) 5/1/18 (visit 3)     Modalities:        Oxygen saturation   96% after UBE                     Therapeutic Exercise: 10 min 15 min 25 min     S/L shoulder ER Educated on form. Instructed to keep in pain-free region. 2x10 3x10     S/L shoulder flexion Educated on form. Instructed to keep in pain-free region. 2x10 3x10     Radial nerve glide Educated on form. Instructed to keep in pain-free region.  2x10 Manual x15     Scapula retraction   3x10 seated Manual stretching  PT assisted: B UT      Cervical active rotation  x10      UBE   4 min reverse L2 to warm up     Manual Therapy:  30 min 20 min       STM to L paraspinals    MET to R cervical extensors     L 1st rib mob Grade II PROM to cervical spine and L 1st rib mob grade I-II             Therapeutic Activities:                                        HEP: See above chart. [facility] Portal    Shoulder elevation ROM: pt able to flex and abduct L UE to ~90 °, per visual estimation. Treatment/Session Assessment: Pt states that she thinks most of her pain was due to scapula tightness. Pt only reported pain transferring from supine <-> sitting. Continue POC. · Pain/ Symptoms: Initial:  0/10 at rest, 5/10 with IR behind the back    Pt states that the entire L hand goes numb. Post Session:  Pt reported no increase in symptoms following today's session. ·   Compliance with Program/Exercises: Will assess as treatment progresses. · Recommendations/Intent for next treatment session: \"Next visit will focus on advancements to more challenging activities\".   Total Treatment Duration:  PT Patient Time In/Time Out  Time In: 1015  Time Out: [address] [facility] [place], [place]

## 2018-05-03 ENCOUNTER — HOSPITAL ENCOUNTER (OUTPATIENT)
Dept: PHYSICAL THERAPY | Age: 78
Discharge: HOME | End: 2018-05-03
Payer: MEDICARE

## 2018-05-03 PROCEDURE — 97110 THERAPEUTIC EXERCISES: CPT

## 2018-05-03 PROCEDURE — 97140 MANUAL THERAPY 1/> REGIONS: CPT

## 2018-05-03 NOTE — PROGRESS NOTES
Nilson Guillen  : 1940 [id number] [facility],[address]  [address] [place] [address].  [phone]   [phone]        OUTPATIENT PHYSICAL THERAPY:Daily Note 5/3/2018        ICD-10: Treatment Diagnosis: Pain in left shoulder (M25.512)  Precautions/Allergies: Other food; Esomeprazole magnesium; Iodinated contrast- oral and iv dye; Iodine and iodide containing products; Shellfish derived; Amitriptyline; Amlodipine; Amoxicillin; Benicar [olmesartan]; Bystolic [nebivolol]; Flecainide; Hydrochlorothiazide; Ibuprofen; Iodine; Keflex [cephalexin]; Levofloxacin (bulk); Lisinopril; Lisinopril-hydrochlorothiazide; Nexium [esomeprazole magnesium]; Nitrofurantoin; Nitrofurantoin macrocrystal; Nitrofurantoin macrocrystalline; and Sulfa (sulfonamide antibiotics)   Fall Risk Score: 1 (? 5 = High Risk)  MD Orders: Evaluate and treat MEDICAL/REFERRING DIAGNOSIS:  Other specified disorders of bone, shoulder [M89.8X1]  DATE OF ONSET: 18  REFERRING PHYSICIAN: Jodi Mayer MD  RETURN PHYSICIAN APPOINTMENT: To be determined      INITIAL ASSESSMENT:  Ms. Jeffrey Grider presents with L UE pain in shoulder that extends to elbow with radicular symptoms into first 2 fingers, limited shoulder ROM, and weakness secondary to pain which is limiting ability to reach overhead and behind back which restricts ability to fully participate in ADLs such as reaching a shelf overhead, dressing such as pulling shirt on, and toileting self-care activities. Pt is a good candidate for skilled PT in order to improve symptoms, strength, and ROM in order to return to prior level of function. PROBLEM LIST (Impacting functional limitations):  1. Decreased Strength  2. Decreased ADL/Functional Activities  3. Increased Pain  4. Decreased Activity Tolerance  5. Decreased Flexibility/Joint Mobility INTERVENTIONS PLANNED:  1. Cryotherapy  2. Electrical Stimulation  3. Heat  4.  Home Exercise Program (HEP)  5. Manual Therapy  6. Neuromuscular Re-education/Strengthening  7. Range of Motion (ROM)  8. Therapeutic Activites  9. Therapeutic Exercise/Strengthening   TREATMENT PLAN:  Effective Dates: 4/24/18 TO 6/5/2018. Frequency/Duration: 2 times a week for 6 weeks  GOALS: (Goals have been discussed and agreed upon with patient.)  Short-Term Functional Goals: Time Frame: 3 weeks  1. Pt will report compliance with home program in order to improve strength and ROM. 2. Pt will improve active elevation in L UE to at least 90 ° to improve ability with reaching ADLs. 3. Pt will improve pain to be able to sleep through night. Discharge Goals: Time Frame: 6 weeks  1. Pt will be able to elevated L UE to 140 ° to be able to reach overhead for ADLs such as dressing. 2. Pt will be able to reach behind back to be able to perform self-care ADLs. 3. Pt will improve strength to 5/5 for all shoulder motions to be able to perform ADLs requiring object manipulation liking placing item into shelf and carrying. Rehabilitation Potential For Stated Goals: Good                HISTORY:   History of Present Injury/Illness (Reason for Referral):  Pt reports that she was working in her garden 2 weeks ago and this vigorous activity aggravated her shoulder. It has been getting worse ever since. She reports she has chronic diarrhea and that reaching behind her for toileting self-care activities is painful in L arm, regardless of arm used. She reports pain in entire L upper quarter and radicular symptoms sometimes into first two fingers. Past Medical History/Comorbidities:   Ms. Galen Vogt  has a past medical history of Acute on chronic combined systolic and diastolic heart failure ([address] (6/11/2015); Acute renal failure (ARF) ([address] (6/13/2015); Adiposity (11/10/2015); Arthritis (4/9/2014); Bilateral carotid artery stenosis; CAD (coronary artery disease) (4/9/2014); Chronic kidney disease; Coagulation disorder ([address]  Congestive heart failure (CHF) ([facility] Dyspnea (6/11/2015); Dyspnea on exertion (4/9/2014); GERD (gastroesophageal reflux disease); History of ITP; Hyperlipidemia (4/9/2014); Hypertension (4/9/2014); Hyponatremia (6/11/2015); Hypothyroid; Hypothyroidism (6/11/2015); Hypoxemia (7/16/2014); Left atrial dilation; Liver disease; Lung nodule (4/9/2014); MI (myocardial infarction) ([facility] Nausea and vomiting (6/13/2016); Other ill-defined conditions(799.89); Pulmonary fibrosis ([facility] (4/9/2014); Pulmonary hypertension ([facility] Pulmonary valve regurgitation; PVC (premature ventricular contraction) (4/25/2014); Raynaud's disease (4/9/2014); Right atrial dilation; Severe tricuspid regurgitation; Sicca syndrome, Sjogren's ([facility] (4/9/2015); and Thrombocytopenia ([facility] (10/12/2015). She also has no past medical history of Adverse effect of anesthesia; Difficult intubation; Malignant hyperthermia due to anesthesia; Pseudocholinesterase deficiency; or Unspecified adverse effect of anesthesia. Ms. Tamiko Rashid  has a past surgical history that includes hx hysterectomy; hx appendectomy; hx tonsil and adenoidectomy; hx orthopaedic; hx hernia repair; hx colonoscopy; and pr colonoscopy w/biopsy single/multiple (12/13/2013). Social History/Living Environment:     Pt lives at home with her significant other and has a walk-in shower. Prior Level of Function/Work/Activity:  Pt does not work but is independent with all ADLs prior to injury. She reports she likes to workout on exercise machine 20 min 4x/week. Dominant Side:         RIGHT  Current Medications:    Current Outpatient Prescriptions:     magnesium oxide (MAG-OX) 400 mg tablet, Take 1 Tab by mouth two (2) times a day., Disp: 180 Tab, Rfl: 3    ketoprofen, micronized 10 % cmap, by Apply Externally route., Disp: , Rfl:     zaleplon (SONATA) 10 mg capsule, Take 1 Cap by mouth nightly as needed.  Max Daily Amount: 10 mg., Disp: 30 Cap, Rfl: 5    cholecalciferol, vitamin D3, 2,000 unit tab, Take 1 Tab by mouth every morning., Disp: 90 Tab, Rfl: 3    OTHER, Ketoprofen 10%. Apply 3 to 4 times daily  Compounded cream RX # [id number], Disp: 60 g, Rfl: prn    ondansetron (ZOFRAN ODT) 4 mg disintegrating tablet, Take 1 Tab by mouth every six (6) hours as needed for Nausea., Disp: 15 Tab, Rfl: 0    furosemide (LASIX) 20 mg tablet, Take 1 Tab by mouth daily. , Disp: 90 Tab, Rfl: 3    vit a,c & e-lutein-minerals (HEALTHY EYES) tablet, Take 2 Tabs by mouth daily. thera tears, Disp: , Rfl:     estradiol (ESTRACE) 0.5 mg tablet, Take 1 Tab by mouth daily. , Disp: 90 Tab, Rfl: 3    levothyroxine (SYNTHROID) 112 mcg tablet, Take 1 Tab by mouth Daily (before breakfast). Indications: hypothyroidism, Disp: 90 Tab, Rfl: 3    Omega-3-DHA-EPA-Fish Oil (FISH OIL) 1,000 mg (120 mg-180 mg) cap, Take  by mouth., Disp: , Rfl:     MILK THISTLE PO, Take  by mouth., Disp: , Rfl:     therapeutic multivitamin (THERAGRAN) tablet, Take 1 Tab by mouth daily. , Disp: , Rfl:     tadalafil (ADCIRCA) 20 mg tablet, Take 20 mg by mouth two (2) times a day. For lungs, Disp: , Rfl:     spironolactone (ALDACTONE) 25 mg tablet, Take 12.5 mg by mouth every morning., Disp: , Rfl:     OXYGEN-AIR DELIVERY SYSTEMS, 2 l/m continuous  Indications: 2 l/m continuous, Disp: , Rfl:     B.infantis-B.ani-B.long-B.bifi (PROBIOTIC 4X) 10-15 mg TbEC, Take  by mouth every morning., Disp: , Rfl:    Date Last Reviewed:  5/3/2018   EXAMINATION:   Observation/Orthostatic Postural Assessment: Forward head posture. Palpation:          Very tender to palpation in L paraspinals in interscapular region and L UT.   ROM:     R UE elevation: full overhead movement  L active flexion: 47 ° limited by pain  L active abduction: 40 ° limited by pain  Passive IR full, passive ER painful   Cervical AROM normal for flexion, extension, L SB, and R rotation. Painful in L UE with R SB and L rotation though R SB not painful after relaxing.   Cervical PROM: muscle guarding and spasm cause pain with R SB but once relaxed, no pain. R trunk active rotation very painful while L rotation not painful. Strength:     R UE 5/5 for all motions  L UE 5/5 for elbow flexion, extension, and IR  Shoulder flexion, abduction, and ER all painful and limited       Special Tests:          ULTT (+) for radial and median nerve bias  Lift off test painful, but able to perform. Neurological Screen:        Sensation: pt reports no abnormalities with light touch screen. Functional Mobility:         Gait/Ambulation:  WNL        Sit to stand transfer: WNL  Balance:          Not tested    Body Structures Involved:  1. Nerves  2. Joints  3. Muscles  4. Ligaments Body Functions Affected:  1. Sensory/Pain  2. Neuromusculoskeletal  3. Movement Related Activities and Participation Affected:  1. Learning and Applying Knowledge  2. General Tasks and Demands  3. Mobility  4. Self Care  5. Domestic Life  6. Interpersonal Interactions and Relationships  7. Community, Social and Civic Life   CLINICAL PRESENTATION:   CLINICAL DECISION MAKING:   Outcome Measure: Tool Used: Disabilities of the Arm, Shoulder and Hand (DASH) Questionnaire - Quick Version  Score:  Initial: 44/55  Most Recent: X/55 (Date: -- )   Interpretation of Score: The DASH is designed to measure the activities of daily living in person's with upper extremity dysfunction or pain. Each section is scored on a 1-5 scale, 5 representing the greatest disability. The scores of each section are added together for a total score of 55. Score 11 12-19 20-28 29-37 38-45 46-54 55   Modifier CH CI CJ CK CL CM CN     ?  Carrying, Moving, and Handling Objects:     - CURRENT STATUS: CL - 60%-79% impaired, limited or restricted    - GOAL STATUS: CI - 1%-19% impaired, limited or restricted    - D/C STATUS:  ---------------To be determined---------------      Medical Necessity:   · Patient is expected to demonstrate progress in strength and range of motion to increase independence with ADLs. Reason for Services/Other Comments:  · Patient has tolerated an increase in activity as demonstrated by: increased resistance/repetitions during therapeutic exercise. TREATMENT:   (In addition to Assessment/Re-Assessment sessions the following treatments were rendered)  THERAPEUTIC ACTIVITY: (See below for minutes): Therapeutic activities per grid below to improve mobility and strength. Required minimal visual, verbal, manual and tactile cues to promote coordination of left, upper extremity(s). THERAPEUTIC EXERCISE: (See below for minutes):  Exercises per grid below to improve mobility and strength. Required minimal visual, verbal, manual and tactile cues to promote proper body alignment, promote proper body posture and promote proper body mechanics. Progressed resistance, range, repetitions and complexity of movement as indicated. NEUROMUSCULAR RE-EDUCATION: (See below for minutes):  Exercise/activities per grid below to improve balance and posture. Required minimal visual, verbal, manual and tactile cues to promote coordination of left, upper extremity(s). MANUAL THERAPY: (See below for minutes): Joint mobilization, Soft tissue mobilization, Manipulation and Manual lymphatic drainage was utilized and necessary because of the patient's restricted joint motion, painful spasm, loss of articular motion and restricted motion of soft tissue. MODALITIES: (See below for minutes)    Date: 4/24/18 (visit 1) 4/27/18 (visit 2) 5/1/18 (visit 3) 5/3/18  (visit 4)    Modalities:        Oxygen saturation   96% after UBE After UBE  94%                      Therapeutic Exercise: 10 min 15 min 25 min   35mins    S/L shoulder ER Educated on form. Instructed to keep in pain-free region. 2x10 3x10 1# 3X10    S/L shoulder flexion Educated on form. Instructed to keep in pain-free region. 2x10 3x10 3x10    S/L abduction    1# 2x10    Radial nerve glide Educated on form.  Instructed to keep in pain-free region. 2x10 Manual x15     Scapula retraction   3x10 seated Seated 2x10    Scapular \"rows\"    grn TB 2x10    Manual stretching  PT assisted: B UT      Cervical active rotation  x10  x10 limited ROM    UBE   4 min reverse L2 to warm up L2 6 min   3 forw/3 back     Manual Therapy:  30 min 20 min 15 mins      STM to L paraspinals    MET to R cervical extensors     L 1st rib mob Grade II PROM to cervical spine and L 1st rib mob grade I-II PROM to cervical spine   STM to L cervical  Paraspinals  Suboccipital distraction            Therapeutic Activities:                                        HEP: See above chart. [employer] Portal    Shoulder elevation ROM: pt able to flex and abduct L UE to ~90 °, per visual estimation. Treatment/Session Assessment: Pt notes continued discomfort with L UE, noting episodes of radicular pain extending into L hand. Continue POC. · Pain/ Symptoms: Initial:  0/10 at rest, 5/10 with IR behind the back    Pt was able to mow lawn yesterday without exacerbation of pain. She continues to report episodes of  L hand going numb. Post Session:  Pt reported no increase in symptoms following today's session. ·   Compliance with Program/Exercises: Will assess as treatment progresses. · Recommendations/Intent for next treatment session: \"Next visit will focus on advancements to more challenging activities\".   ·   Total Treatment Duration:  PT Patient Time In/Time Out  Time In: 0845  Time Out: [address], PTA

## 2018-05-08 ENCOUNTER — HOSPITAL ENCOUNTER (OUTPATIENT)
Dept: PHYSICAL THERAPY | Age: 78
Discharge: HOME | End: 2018-05-08
Payer: MEDICARE

## 2018-05-08 PROCEDURE — 97140 MANUAL THERAPY 1/> REGIONS: CPT

## 2018-05-08 NOTE — PROGRESS NOTES
[facility]  : [id number] [facility],[address]  [address] [place] [address].  [phone]   [phone]        OUTPATIENT PHYSICAL THERAPY:Daily Note 2018        ICD-10: Treatment Diagnosis: Pain in left shoulder (M25.512)  Precautions/Allergies: Other food; Esomeprazole magnesium; Iodinated contrast- oral and iv dye; Iodine and iodide containing products; Shellfish derived; Amitriptyline; Amlodipine; Amoxicillin; Benicar [olmesartan]; Bystolic [nebivolol]; Flecainide; Hydrochlorothiazide; Ibuprofen; Iodine; Keflex [cephalexin]; Levofloxacin (bulk); Lisinopril; Lisinopril-hydrochlorothiazide; Nexium [esomeprazole magnesium]; Nitrofurantoin; Nitrofurantoin macrocrystal; Nitrofurantoin macrocrystalline; and Sulfa (sulfonamide antibiotics)   Fall Risk Score: 1 (? 5 = High Risk)  MD Orders: Evaluate and treat MEDICAL/REFERRING DIAGNOSIS:  Other specified disorders of bone, shoulder [M89.8X1]  DATE OF ONSET: 18  REFERRING PHYSICIAN: Kaylen Funk MD  RETURN PHYSICIAN APPOINTMENT: To be determined      INITIAL ASSESSMENT:  Ms. Josef Nicole presents with L UE pain in shoulder that extends to elbow with radicular symptoms into first 2 fingers, limited shoulder ROM, and weakness secondary to pain which is limiting ability to reach overhead and behind back which restricts ability to fully participate in ADLs such as reaching a shelf overhead, dressing such as pulling shirt on, and toileting self-care activities. Pt is a good candidate for skilled PT in order to improve symptoms, strength, and ROM in order to return to prior level of function. PROBLEM LIST (Impacting functional limitations):  1. Decreased Strength  2. Decreased ADL/Functional Activities  3. Increased Pain  4. Decreased Activity Tolerance  5. Decreased Flexibility/Joint Mobility INTERVENTIONS PLANNED:  1. Cryotherapy  2. Electrical Stimulation  3. Heat  4.  Home Exercise Program (HEP)  5. Manual Therapy  6. Neuromuscular Re-education/Strengthening  7. Range of Motion (ROM)  8. Therapeutic Activites  9. Therapeutic Exercise/Strengthening   TREATMENT PLAN:  Effective Dates: 4/24/18 TO 6/5/2018. Frequency/Duration: 2 times a week for 6 weeks  GOALS: (Goals have been discussed and agreed upon with patient.)  Short-Term Functional Goals: Time Frame: 3 weeks  1. Pt will report compliance with home program in order to improve strength and ROM. 2. Pt will improve active elevation in L UE to at least 90 ° to improve ability with reaching ADLs. 3. Pt will improve pain to be able to sleep through night. Discharge Goals: Time Frame: 6 weeks  1. Pt will be able to elevated L UE to 140 ° to be able to reach overhead for ADLs such as dressing. 2. Pt will be able to reach behind back to be able to perform self-care ADLs. 3. Pt will improve strength to 5/5 for all shoulder motions to be able to perform ADLs requiring object manipulation liking placing item into shelf and carrying. Rehabilitation Potential For Stated Goals: Good                HISTORY:   History of Present Injury/Illness (Reason for Referral):  Pt reports that she was working in her garden 2 weeks ago and this vigorous activity aggravated her shoulder. It has been getting worse ever since. She reports she has chronic diarrhea and that reaching behind her for toileting self-care activities is painful in L arm, regardless of arm used. She reports pain in entire L upper quarter and radicular symptoms sometimes into first two fingers. Past Medical History/Comorbidities:   Ms. Kev Laureano  has a past medical history of Acute on chronic combined systolic and diastolic heart failure ([address] (6/11/2015); Acute renal failure (ARF) ([address] (6/13/2015); Adiposity (11/10/2015); Arthritis (4/9/2014); Bilateral carotid artery stenosis; CAD (coronary artery disease) (4/9/2014); Chronic kidney disease; Coagulation disorder ([address]  Congestive heart failure (CHF) ([facility] Dyspnea (6/11/2015); Dyspnea on exertion (4/9/2014); GERD (gastroesophageal reflux disease); History of ITP; Hyperlipidemia (4/9/2014); Hypertension (4/9/2014); Hyponatremia (6/11/2015); Hypothyroid; Hypothyroidism (6/11/2015); Hypoxemia (7/16/2014); Left atrial dilation; Liver disease; Lung nodule (4/9/2014); MI (myocardial infarction) ([facility] Nausea and vomiting (6/13/2016); Other ill-defined conditions(799.89); Pulmonary fibrosis ([facility] (4/9/2014); Pulmonary hypertension ([facility] Pulmonary valve regurgitation; PVC (premature ventricular contraction) (4/25/2014); Raynaud's disease (4/9/2014); Right atrial dilation; Severe tricuspid regurgitation; Sicca syndrome, Sjogren's ([facility] (4/9/2015); and Thrombocytopenia ([facility] (10/12/2015). She also has no past medical history of Adverse effect of anesthesia; Difficult intubation; Malignant hyperthermia due to anesthesia; Pseudocholinesterase deficiency; or Unspecified adverse effect of anesthesia. Ms. Santhosh Hunt  has a past surgical history that includes hx hysterectomy; hx appendectomy; hx tonsil and adenoidectomy; hx orthopaedic; hx hernia repair; hx colonoscopy; and pr colonoscopy w/biopsy single/multiple (12/13/2013). Social History/Living Environment:     Pt lives at home with her significant other and has a walk-in shower. Prior Level of Function/Work/Activity:  Pt does not work but is independent with all ADLs prior to injury. She reports she likes to workout on exercise machine 20 min 4x/week. Dominant Side:         RIGHT  Current Medications:    Current Outpatient Prescriptions:     magnesium oxide (MAG-OX) 400 mg tablet, Take 1 Tab by mouth two (2) times a day., Disp: 180 Tab, Rfl: 3    ketoprofen, micronized 10 % cmap, by Apply Externally route., Disp: , Rfl:     zaleplon (SONATA) 10 mg capsule, Take 1 Cap by mouth nightly as needed.  Max Daily Amount: 10 mg., Disp: 30 Cap, Rfl: 5    cholecalciferol, vitamin D3, 2,000 unit tab, Take 1 Tab by mouth every morning., Disp: 90 Tab, Rfl: 3    OTHER, Ketoprofen 10%. Apply 3 to 4 times daily  Compounded cream RX # [id number], Disp: 60 g, Rfl: prn    ondansetron (ZOFRAN ODT) 4 mg disintegrating tablet, Take 1 Tab by mouth every six (6) hours as needed for Nausea., Disp: 15 Tab, Rfl: 0    furosemide (LASIX) 20 mg tablet, Take 1 Tab by mouth daily. , Disp: 90 Tab, Rfl: 3    vit a,c & e-lutein-minerals (HEALTHY EYES) tablet, Take 2 Tabs by mouth daily. thera tears, Disp: , Rfl:     estradiol (ESTRACE) 0.5 mg tablet, Take 1 Tab by mouth daily. , Disp: 90 Tab, Rfl: 3    levothyroxine (SYNTHROID) 112 mcg tablet, Take 1 Tab by mouth Daily (before breakfast). Indications: hypothyroidism, Disp: 90 Tab, Rfl: 3    Omega-3-DHA-EPA-Fish Oil (FISH OIL) 1,000 mg (120 mg-180 mg) cap, Take  by mouth., Disp: , Rfl:     MILK THISTLE PO, Take  by mouth., Disp: , Rfl:     therapeutic multivitamin (THERAGRAN) tablet, Take 1 Tab by mouth daily. , Disp: , Rfl:     tadalafil (ADCIRCA) 20 mg tablet, Take 20 mg by mouth two (2) times a day. For lungs, Disp: , Rfl:     spironolactone (ALDACTONE) 25 mg tablet, Take 12.5 mg by mouth every morning., Disp: , Rfl:     OXYGEN-AIR DELIVERY SYSTEMS, 2 l/m continuous  Indications: 2 l/m continuous, Disp: , Rfl:     B.infantis-B.ani-B.long-B.bifi (PROBIOTIC 4X) 10-15 mg TbEC, Take  by mouth every morning., Disp: , Rfl:    Date Last Reviewed:  5/8/2018   EXAMINATION:   Observation/Orthostatic Postural Assessment: Forward head posture. Palpation:          Very tender to palpation in L paraspinals in interscapular region and L UT.   ROM:     R UE elevation: full overhead movement  L active flexion: 47 ° limited by pain  L active abduction: 40 ° limited by pain  Passive IR full, passive ER painful   Cervical AROM normal for flexion, extension, L SB, and R rotation. Painful in L UE with R SB and L rotation though R SB not painful after relaxing.   Cervical PROM: muscle guarding and spasm cause pain with R SB but once relaxed, no pain. R trunk active rotation very painful while L rotation not painful. Strength:     R UE 5/5 for all motions  L UE 5/5 for elbow flexion, extension, and IR  Shoulder flexion, abduction, and ER all painful and limited       Special Tests:          ULTT (+) for radial and median nerve bias  Lift off test painful, but able to perform. Neurological Screen:        Sensation: pt reports no abnormalities with light touch screen. Functional Mobility:         Gait/Ambulation:  WNL        Sit to stand transfer: WNL  Balance:          Not tested    Body Structures Involved:  1. Nerves  2. Joints  3. Muscles  4. Ligaments Body Functions Affected:  1. Sensory/Pain  2. Neuromusculoskeletal  3. Movement Related Activities and Participation Affected:  1. Learning and Applying Knowledge  2. General Tasks and Demands  3. Mobility  4. Self Care  5. Domestic Life  6. Interpersonal Interactions and Relationships  7. Community, Social and Civic Life   CLINICAL PRESENTATION:   CLINICAL DECISION MAKING:   Outcome Measure: Tool Used: Disabilities of the Arm, Shoulder and Hand (DASH) Questionnaire - Quick Version  Score:  Initial: 44/55  Most Recent: X/55 (Date: -- )   Interpretation of Score: The DASH is designed to measure the activities of daily living in person's with upper extremity dysfunction or pain. Each section is scored on a 1-5 scale, 5 representing the greatest disability. The scores of each section are added together for a total score of 55. Score 11 12-19 20-28 29-37 38-45 46-54 55   Modifier CH CI CJ CK CL CM CN     ?  Carrying, Moving, and Handling Objects:     - CURRENT STATUS: CL - 60%-79% impaired, limited or restricted    - GOAL STATUS: CI - 1%-19% impaired, limited or restricted    - D/C STATUS:  ---------------To be determined---------------      Medical Necessity:   · Patient is expected to demonstrate progress in strength and range of motion to increase independence with ADLs. Reason for Services/Other Comments:  · Patient has tolerated an increase in activity as demonstrated by: increased resistance/repetitions during therapeutic exercise. TREATMENT:   (In addition to Assessment/Re-Assessment sessions the following treatments were rendered)  THERAPEUTIC ACTIVITY: (See below for minutes): Therapeutic activities per grid below to improve mobility and strength. Required minimal visual, verbal, manual and tactile cues to promote coordination of left, upper extremity(s). THERAPEUTIC EXERCISE: (See below for minutes):  Exercises per grid below to improve mobility and strength. Required minimal visual, verbal, manual and tactile cues to promote proper body alignment, promote proper body posture and promote proper body mechanics. Progressed resistance, range, repetitions and complexity of movement as indicated. NEUROMUSCULAR RE-EDUCATION: (See below for minutes):  Exercise/activities per grid below to improve balance and posture. Required minimal visual, verbal, manual and tactile cues to promote coordination of left, upper extremity(s). MANUAL THERAPY: (See below for minutes): Joint mobilization, Soft tissue mobilization, Manipulation and Manual lymphatic drainage was utilized and necessary because of the patient's restricted joint motion, painful spasm, loss of articular motion and restricted motion of soft tissue. MODALITIES: (See below for minutes)    Date: 4/24/18 (visit 1) 4/27/18 (visit 2) 5/1/18 (visit 3) 5/3/18  (visit 4) 5/8/18 (visit 5)   Modalities:     10 min   Oxygen saturation   96% after UBE After UBE  94%      Moist hot pack     10 min to L arm/elbow           Therapeutic Exercise: 10 min 15 min 25 min   35mins 5 min   S/L shoulder ER Educated on form. Instructed to keep in pain-free region. 2x10 3x10 1# 3X10    S/L shoulder flexion Educated on form. Instructed to keep in pain-free region.  2x10 3x10 3x10    S/L abduction 1# 2x10    Radial nerve glide Educated on form. Instructed to keep in pain-free region. 2x10 Manual x15     Scapula retraction   3x10 seated Seated 2x10    Scapular \"rows\"    grn TB 2x10    Manual stretching  PT assisted: B UT      Cervical active rotation  x10  x10 limited ROM    UBE   4 min reverse L2 to warm up L2 6 min   3 forw/3 back  L2 5 min reverse   Manual Therapy:  30 min 20 min 15 mins 40 min     STM to L paraspinals    MET to R cervical extensors     L 1st rib mob Grade II PROM to cervical spine and L 1st rib mob grade I-II PROM to cervical spine   STM to L cervical  Paraspinals  Suboccipital distraction PROM to cervical spine and gentle distraction. Nerve glides performed to L UE           Therapeutic Activities:                                        HEP: See above chart. [employer] Portal    Shoulder elevation ROM: pt able to flex and abduct L UE to ~90 °, per visual estimation. Treatment/Session Assessment: Pt reported tingling in the last 2 digits of the L hand and pt reported shooting pain down the L arm and elbow. Pt also reported an episode of tingling when rolling over in supine. Continue POC. · Pain/ Symptoms: Initial:  9/10 L elbow    She continues to report episodes of  L hand going numb. Post Session: 9/10 L elbow ·   Compliance with Program/Exercises: Will assess as treatment progresses. · Recommendations/Intent for next treatment session: \"Next visit will focus on advancements to more challenging activities\".   ·   Total Treatment Duration:  PT Patient Time In/Time Out  Time In: 0845  Time Out: [address], PTA

## 2018-05-10 ENCOUNTER — HOSPITAL ENCOUNTER (OUTPATIENT)
Dept: PHYSICAL THERAPY | Age: 78
Discharge: HOME | End: 2018-05-10
Payer: MEDICARE

## 2018-05-10 PROCEDURE — 97110 THERAPEUTIC EXERCISES: CPT

## 2018-05-10 PROCEDURE — 97140 MANUAL THERAPY 1/> REGIONS: CPT

## 2018-05-10 NOTE — PROGRESS NOTES
Kimi Kovacs  : 1940 [id number] [address].  [phone]   [phone]        OUTPATIENT PHYSICAL THERAPY:Daily Note 5/10/2018        ICD-10: Treatment Diagnosis: Pain in left shoulder (M25.512)  Precautions/Allergies: Other food; Esomeprazole magnesium; Iodinated contrast- oral and iv dye; Iodine and iodide containing products; Shellfish derived; Amitriptyline; Amlodipine; Amoxicillin; Benicar [olmesartan]; Bystolic [nebivolol]; Flecainide; Hydrochlorothiazide; Ibuprofen; Iodine; Keflex [cephalexin]; Levofloxacin (bulk); Lisinopril; Lisinopril-hydrochlorothiazide; Nexium [esomeprazole magnesium]; Nitrofurantoin; Nitrofurantoin macrocrystal; Nitrofurantoin macrocrystalline; and Sulfa (sulfonamide antibiotics)   Fall Risk Score: 1 (? 5 = High Risk)  MD Orders: Evaluate and treat MEDICAL/REFERRING DIAGNOSIS:  Other specified disorders of bone, shoulder [M89.8X1]  DATE OF ONSET: 18  REFERRING PHYSICIAN: Gab Chavez MD  RETURN PHYSICIAN APPOINTMENT: To be determined      INITIAL ASSESSMENT:  Ms. Kaylie Gong presents with L UE pain in shoulder that extends to elbow with radicular symptoms into first 2 fingers, limited shoulder ROM, and weakness secondary to pain which is limiting ability to reach overhead and behind back which restricts ability to fully participate in ADLs such as reaching a shelf overhead, dressing such as pulling shirt on, and toileting self-care activities. Pt is a good candidate for skilled PT in order to improve symptoms, strength, and ROM in order to return to prior level of function. PROBLEM LIST (Impacting functional limitations):  1. Decreased Strength  2. Decreased ADL/Functional Activities  3. Increased Pain  4. Decreased Activity Tolerance  5. Decreased Flexibility/Joint Mobility INTERVENTIONS PLANNED:  1. Cryotherapy  2. Electrical Stimulation  3. Heat  4.  Home Exercise Program (HEP)  5. Manual Therapy  6. Neuromuscular Re-education/Strengthening  7. Range of Motion (ROM)  8. Therapeutic Activites  9. Therapeutic Exercise/Strengthening   TREATMENT PLAN:  Effective Dates: 4/24/18 TO 6/5/2018. Frequency/Duration: 2 times a week for 6 weeks  GOALS: (Goals have been discussed and agreed upon with patient.)  Short-Term Functional Goals: Time Frame: 3 weeks  1. Pt will report compliance with home program in order to improve strength and ROM. 2. Pt will improve active elevation in L UE to at least 90 ° to improve ability with reaching ADLs. 3. Pt will improve pain to be able to sleep through night. Discharge Goals: Time Frame: 6 weeks  1. Pt will be able to elevated L UE to 140 ° to be able to reach overhead for ADLs such as dressing. 2. Pt will be able to reach behind back to be able to perform self-care ADLs. 3. Pt will improve strength to 5/5 for all shoulder motions to be able to perform ADLs requiring object manipulation liking placing item into shelf and carrying. Rehabilitation Potential For Stated Goals: Good                HISTORY:   History of Present Injury/Illness (Reason for Referral):  Pt reports that she was working in her garden 2 weeks ago and this vigorous activity aggravated her shoulder. It has been getting worse ever since. She reports she has chronic diarrhea and that reaching behind her for toileting self-care activities is painful in L arm, regardless of arm used. She reports pain in entire L upper quarter and radicular symptoms sometimes into first two fingers. Past Medical History/Comorbidities:   Ms. Eleni Middleton  has a past medical history of Acute on chronic combined systolic and diastolic heart failure ([address] (6/11/2015); Acute renal failure (ARF) ([address] (6/13/2015); Adiposity (11/10/2015); Arthritis (4/9/2014); Bilateral carotid artery stenosis; CAD (coronary artery disease) (4/9/2014); Chronic kidney disease; Coagulation disorder ([address]  Congestive heart failure (CHF) ([facility] Dyspnea (6/11/2015); Dyspnea on exertion (4/9/2014); GERD (gastroesophageal reflux disease); History of ITP; Hyperlipidemia (4/9/2014); Hypertension (4/9/2014); Hyponatremia (6/11/2015); Hypothyroid; Hypothyroidism (6/11/2015); Hypoxemia (7/16/2014); Left atrial dilation; Liver disease; Lung nodule (4/9/2014); MI (myocardial infarction) ([facility] Nausea and vomiting (6/13/2016); Other ill-defined conditions(799.89); Pulmonary fibrosis ([facility] (4/9/2014); Pulmonary hypertension ([facility] Pulmonary valve regurgitation; PVC (premature ventricular contraction) (4/25/2014); Raynaud's disease (4/9/2014); Right atrial dilation; Severe tricuspid regurgitation; Sicca syndrome, Sjogren's ([facility] (4/9/2015); and Thrombocytopenia ([facility] (10/12/2015). She also has no past medical history of Adverse effect of anesthesia; Difficult intubation; Malignant hyperthermia due to anesthesia; Pseudocholinesterase deficiency; or Unspecified adverse effect of anesthesia. Ms. Serenity Ramon  has a past surgical history that includes hx hysterectomy; hx appendectomy; hx tonsil and adenoidectomy; hx orthopaedic; hx hernia repair; hx colonoscopy; and pr colonoscopy w/biopsy single/multiple (12/13/2013). Social History/Living Environment:     Pt lives at home with her significant other and has a walk-in shower. Prior Level of Function/Work/Activity:  Pt does not work but is independent with all ADLs prior to injury. She reports she likes to workout on exercise machine 20 min 4x/week. Dominant Side:         RIGHT  Current Medications:    Current Outpatient Prescriptions:     magnesium oxide (MAG-OX) 400 mg tablet, Take 1 Tab by mouth two (2) times a day., Disp: 180 Tab, Rfl: 3    ketoprofen, micronized 10 % cmap, by Apply Externally route., Disp: , Rfl:     zaleplon (SONATA) 10 mg capsule, Take 1 Cap by mouth nightly as needed.  Max Daily Amount: 10 mg., Disp: 30 Cap, Rfl: 5    cholecalciferol, vitamin D3, 2,000 unit tab, Take 1 Tab by mouth every morning., Disp: 90 Tab, Rfl: 3    OTHER, Ketoprofen 10%. Apply 3 to 4 times daily  Compounded cream RX # [id number], Disp: 60 g, Rfl: prn    ondansetron (ZOFRAN ODT) 4 mg disintegrating tablet, Take 1 Tab by mouth every six (6) hours as needed for Nausea., Disp: 15 Tab, Rfl: 0    furosemide (LASIX) 20 mg tablet, Take 1 Tab by mouth daily. , Disp: 90 Tab, Rfl: 3    vit a,c & e-lutein-minerals (HEALTHY EYES) tablet, Take 2 Tabs by mouth daily. thera tears, Disp: , Rfl:     estradiol (ESTRACE) 0.5 mg tablet, Take 1 Tab by mouth daily. , Disp: 90 Tab, Rfl: 3    levothyroxine (SYNTHROID) 112 mcg tablet, Take 1 Tab by mouth Daily (before breakfast). Indications: hypothyroidism, Disp: 90 Tab, Rfl: 3    Omega-3-DHA-EPA-Fish Oil (FISH OIL) 1,000 mg (120 mg-180 mg) cap, Take  by mouth., Disp: , Rfl:     MILK THISTLE PO, Take  by mouth., Disp: , Rfl:     therapeutic multivitamin (THERAGRAN) tablet, Take 1 Tab by mouth daily. , Disp: , Rfl:     tadalafil (ADCIRCA) 20 mg tablet, Take 20 mg by mouth two (2) times a day. For lungs, Disp: , Rfl:     spironolactone (ALDACTONE) 25 mg tablet, Take 12.5 mg by mouth every morning., Disp: , Rfl:     OXYGEN-AIR DELIVERY SYSTEMS, 2 l/m continuous  Indications: 2 l/m continuous, Disp: , Rfl:     B.infantis-B.ani-B.long-B.bifi (PROBIOTIC 4X) 10-15 mg TbEC, Take  by mouth every morning., Disp: , Rfl:    Date Last Reviewed:  5/10/2018   EXAMINATION:   Observation/Orthostatic Postural Assessment: Forward head posture. Palpation:          Very tender to palpation in L paraspinals in interscapular region and L UT.   ROM:     R UE elevation: full overhead movement  L active flexion: 47 ° limited by pain  L active abduction: 40 ° limited by pain  Passive IR full, passive ER painful   Cervical AROM normal for flexion, extension, L SB, and R rotation. Painful in L UE with R SB and L rotation though R SB not painful after relaxing.   Cervical PROM: muscle guarding and spasm cause pain with R SB but once relaxed, no pain. R trunk active rotation very painful while L rotation not painful. Strength:     R UE 5/5 for all motions  L UE 5/5 for elbow flexion, extension, and IR  Shoulder flexion, abduction, and ER all painful and limited       Special Tests:          ULTT (+) for radial and median nerve bias  Lift off test painful, but able to perform. Neurological Screen:        Sensation: pt reports no abnormalities with light touch screen. Functional Mobility:         Gait/Ambulation:  WNL        Sit to stand transfer: WNL  Balance:          Not tested    Body Structures Involved:  1. Nerves  2. Joints  3. Muscles  4. Ligaments Body Functions Affected:  1. Sensory/Pain  2. Neuromusculoskeletal  3. Movement Related Activities and Participation Affected:  1. Learning and Applying Knowledge  2. General Tasks and Demands  3. Mobility  4. Self Care  5. Domestic Life  6. Interpersonal Interactions and Relationships  7. Community, Social and Civic Life   CLINICAL PRESENTATION:   CLINICAL DECISION MAKING:   Outcome Measure: Tool Used: Disabilities of the Arm, Shoulder and Hand (DASH) Questionnaire - Quick Version  Score:  Initial: 44/55  Most Recent: X/55 (Date: -- )   Interpretation of Score: The DASH is designed to measure the activities of daily living in person's with upper extremity dysfunction or pain. Each section is scored on a 1-5 scale, 5 representing the greatest disability. The scores of each section are added together for a total score of 55. Score 11 12-19 20-28 29-37 38-45 46-54 55   Modifier CH CI CJ CK CL CM CN     ?  Carrying, Moving, and Handling Objects:     - CURRENT STATUS: CL - 60%-79% impaired, limited or restricted    - GOAL STATUS: CI - 1%-19% impaired, limited or restricted    - D/C STATUS:  ---------------To be determined---------------      Medical Necessity:   · Patient is expected to demonstrate progress in strength and range of motion to increase independence with ADLs. Reason for Services/Other Comments:  · Patient has tolerated an increase in activity as demonstrated by: increased resistance/repetitions during therapeutic exercise. TREATMENT:   (In addition to Assessment/Re-Assessment sessions the following treatments were rendered)  THERAPEUTIC ACTIVITY: (See below for minutes): Therapeutic activities per grid below to improve mobility and strength. Required minimal visual, verbal, manual and tactile cues to promote coordination of left, upper extremity(s). THERAPEUTIC EXERCISE: (See below for minutes):  Exercises per grid below to improve mobility and strength. Required minimal visual, verbal, manual and tactile cues to promote proper body alignment, promote proper body posture and promote proper body mechanics. Progressed resistance, range, repetitions and complexity of movement as indicated. NEUROMUSCULAR RE-EDUCATION: (See below for minutes):  Exercise/activities per grid below to improve balance and posture. Required minimal visual, verbal, manual and tactile cues to promote coordination of left, upper extremity(s). MANUAL THERAPY: (See below for minutes): Joint mobilization, Soft tissue mobilization, Manipulation and Manual lymphatic drainage was utilized and necessary because of the patient's restricted joint motion, painful spasm, loss of articular motion and restricted motion of soft tissue. MODALITIES: (See below for minutes)    Date: 4/24/18 (visit 1) 4/27/18 (visit 2) 5/1/18 (visit 3) 5/3/18  (visit 4) 5/8/18 (visit 5) 5/10/18 (visit 6)   Modalities:     10 min 10 min   Oxygen saturation   96% after UBE After UBE  94%       Moist hot pack     10 min to L arm/elbow 10 min to L shoulder            Therapeutic Exercise: 10 min 15 min 25 min   35mins 5 min 35 min   S/L shoulder ER Educated on form. Instructed to keep in pain-free region. 2x10 3x10 1# 3X10     S/L shoulder flexion Educated on form.  Instructed to keep in pain-free region. 2x10 3x10 3x10     S/L abduction    1# 2x10     Shoulder extension      3x10 blue   Radial nerve glide Educated on form. Instructed to keep in pain-free region. 2x10 Manual x15   Median and ulnar nerve glides x10    Scapula retraction   3x10 seated Seated 2x10  Blue 2x10   Scapular \"rows\"    grn TB 2x10     Manual stretching  PT assisted: B UT       Cervical active rotation  x10  x10 limited ROM  Upper trap stretch 10 sec hold x6   UBE   4 min reverse L2 to warm up L2 6 min   3 forw/3 back  L2 5 min reverse 6 min L3 forward/back   Manual Therapy:  30 min 20 min 15 mins 40 min 10 min     STM to L paraspinals    MET to R cervical extensors     L 1st rib mob Grade II PROM to cervical spine and L 1st rib mob grade I-II PROM to cervical spine   STM to L cervical  Paraspinals  Suboccipital distraction PROM to cervical spine and gentle distraction. Nerve glides performed to L UE Cervical distraction            Therapeutic Activities:         Education on log rolling      5 min                              HEP: See above chart. [employer] Portal    Shoulder elevation ROM: pt able to flex and abduct L UE to ~90 °, per visual estimation. Treatment/Session Assessment: Pt reported increased numbness and tingling in the L elbow. Continue POC. · Pain/ Symptoms: Initial:  0/10 L elbow at rest    She continues to report episodes of  L hand going numb. Pt states \"When I move a certain way, I have the numbness and pain in the elbow and hand. It's getting better though. \"   Post Session: intermittent pain in L elbow ·   Compliance with Program/Exercises: Will assess as treatment progresses. · Recommendations/Intent for next treatment session: \"Next visit will focus on advancements to more challenging activities\".   ·   Total Treatment Duration:  PT Patient Time In/Time Out  Time In: 0845  Time Out: [address], PTA

## 2018-05-15 ENCOUNTER — HOSPITAL ENCOUNTER (OUTPATIENT)
Dept: PHYSICAL THERAPY | Age: 78
Discharge: HOME | End: 2018-05-15
Payer: MEDICARE

## 2018-05-15 PROCEDURE — 97110 THERAPEUTIC EXERCISES: CPT

## 2018-05-15 PROCEDURE — 97140 MANUAL THERAPY 1/> REGIONS: CPT

## 2018-05-15 NOTE — PROGRESS NOTES
Misty Beth  : [id number] [facility],[address]  [address] [place] [address].  [phone]   [phone]        OUTPATIENT PHYSICAL THERAPY:Daily Note 5/15/2018        ICD-10: Treatment Diagnosis: Pain in left shoulder (M25.512)  Precautions/Allergies: Other food; Esomeprazole magnesium; Iodinated contrast- oral and iv dye; Iodine and iodide containing products; Shellfish derived; Amitriptyline; Amlodipine; Amoxicillin; Benicar [olmesartan]; Bystolic [nebivolol]; Flecainide; Hydrochlorothiazide; Ibuprofen; Iodine; Keflex [cephalexin]; Levofloxacin (bulk); Lisinopril; Lisinopril-hydrochlorothiazide; Nexium [esomeprazole magnesium]; Nitrofurantoin; Nitrofurantoin macrocrystal; Nitrofurantoin macrocrystalline; and Sulfa (sulfonamide antibiotics)   Fall Risk Score: 1 (? 5 = High Risk)  MD Orders: Evaluate and treat MEDICAL/REFERRING DIAGNOSIS:  Other specified disorders of bone, shoulder [M89.8X1]  DATE OF ONSET: 18  REFERRING PHYSICIAN: Lynn Marshall MD  RETURN PHYSICIAN APPOINTMENT: To be determined      INITIAL ASSESSMENT:  Ms. Christy Cornejo presents with L UE pain in shoulder that extends to elbow with radicular symptoms into first 2 fingers, limited shoulder ROM, and weakness secondary to pain which is limiting ability to reach overhead and behind back which restricts ability to fully participate in ADLs such as reaching a shelf overhead, dressing such as pulling shirt on, and toileting self-care activities. Pt is a good candidate for skilled PT in order to improve symptoms, strength, and ROM in order to return to prior level of function. PROBLEM LIST (Impacting functional limitations):  1. Decreased Strength  2. Decreased ADL/Functional Activities  3. Increased Pain  4. Decreased Activity Tolerance  5. Decreased Flexibility/Joint Mobility INTERVENTIONS PLANNED:  1. Cryotherapy  2. Electrical Stimulation  3. Heat  4.  Home Exercise Program (HEP)  5. Manual Therapy  6. Neuromuscular Re-education/Strengthening  7. Range of Motion (ROM)  8. Therapeutic Activites  9. Therapeutic Exercise/Strengthening   TREATMENT PLAN:  Effective Dates: 4/24/18 TO 6/5/2018. Frequency/Duration: 2 times a week for 6 weeks  GOALS: (Goals have been discussed and agreed upon with patient.)  Short-Term Functional Goals: Time Frame: 3 weeks  1. Pt will report compliance with home program in order to improve strength and ROM. 2. Pt will improve active elevation in L UE to at least 90 ° to improve ability with reaching ADLs. 3. Pt will improve pain to be able to sleep through night. Discharge Goals: Time Frame: 6 weeks  1. Pt will be able to elevated L UE to 140 ° to be able to reach overhead for ADLs such as dressing. 2. Pt will be able to reach behind back to be able to perform self-care ADLs. 3. Pt will improve strength to 5/5 for all shoulder motions to be able to perform ADLs requiring object manipulation liking placing item into shelf and carrying. Rehabilitation Potential For Stated Goals: Good                HISTORY:   History of Present Injury/Illness (Reason for Referral):  Pt reports that she was working in her garden 2 weeks ago and this vigorous activity aggravated her shoulder. It has been getting worse ever since. She reports she has chronic diarrhea and that reaching behind her for toileting self-care activities is painful in L arm, regardless of arm used. She reports pain in entire L upper quarter and radicular symptoms sometimes into first two fingers. Past Medical History/Comorbidities:   Ms. Miguel Taylor  has a past medical history of Acute on chronic combined systolic and diastolic heart failure ([address] (6/11/2015); Acute renal failure (ARF) ([address] (6/13/2015); Adiposity (11/10/2015); Arthritis (4/9/2014); Bilateral carotid artery stenosis; CAD (coronary artery disease) (4/9/2014); Chronic kidney disease; Coagulation disorder ([address]  Congestive heart failure (CHF) ([facility] Dyspnea (6/11/2015); Dyspnea on exertion (4/9/2014); GERD (gastroesophageal reflux disease); History of ITP; Hyperlipidemia (4/9/2014); Hypertension (4/9/2014); Hyponatremia (6/11/2015); Hypothyroid; Hypothyroidism (6/11/2015); Hypoxemia (7/16/2014); Left atrial dilation; Liver disease; Lung nodule (4/9/2014); MI (myocardial infarction) ([facility] Nausea and vomiting (6/13/2016); Other ill-defined conditions(799.89); Pulmonary fibrosis ([facility] (4/9/2014); Pulmonary hypertension ([facility] Pulmonary valve regurgitation; PVC (premature ventricular contraction) (4/25/2014); Raynaud's disease (4/9/2014); Right atrial dilation; Severe tricuspid regurgitation; Sicca syndrome, Sjogren's ([facility] (4/9/2015); and Thrombocytopenia ([facility] (10/12/2015). She also has no past medical history of Adverse effect of anesthesia; Difficult intubation; Malignant hyperthermia due to anesthesia; Pseudocholinesterase deficiency; or Unspecified adverse effect of anesthesia. Ms. Jun Michele  has a past surgical history that includes hx hysterectomy; hx appendectomy; hx tonsil and adenoidectomy; hx orthopaedic; hx hernia repair; hx colonoscopy; and pr colonoscopy w/biopsy single/multiple (12/13/2013). Social History/Living Environment:     Pt lives at home with her significant other and has a walk-in shower. Prior Level of Function/Work/Activity:  Pt does not work but is independent with all ADLs prior to injury. She reports she likes to workout on exercise machine 20 min 4x/week. Dominant Side:         RIGHT  Current Medications:    Current Outpatient Prescriptions:     magnesium oxide (MAG-OX) 400 mg tablet, Take 1 Tab by mouth two (2) times a day., Disp: 180 Tab, Rfl: 3    ketoprofen, micronized 10 % cmap, by Apply Externally route., Disp: , Rfl:     zaleplon (SONATA) 10 mg capsule, Take 1 Cap by mouth nightly as needed.  Max Daily Amount: 10 mg., Disp: 30 Cap, Rfl: 5    cholecalciferol, vitamin D3, 2,000 unit tab, Take 1 Tab by mouth every morning., Disp: 90 Tab, Rfl: 3    OTHER, Ketoprofen 10%. Apply 3 to 4 times daily  Compounded cream RX # [id number], Disp: 60 g, Rfl: prn    ondansetron (ZOFRAN ODT) 4 mg disintegrating tablet, Take 1 Tab by mouth every six (6) hours as needed for Nausea., Disp: 15 Tab, Rfl: 0    furosemide (LASIX) 20 mg tablet, Take 1 Tab by mouth daily. , Disp: 90 Tab, Rfl: 3    vit a,c & e-lutein-minerals (HEALTHY EYES) tablet, Take 2 Tabs by mouth daily. thera tears, Disp: , Rfl:     estradiol (ESTRACE) 0.5 mg tablet, Take 1 Tab by mouth daily. , Disp: 90 Tab, Rfl: 3    levothyroxine (SYNTHROID) 112 mcg tablet, Take 1 Tab by mouth Daily (before breakfast). Indications: hypothyroidism, Disp: 90 Tab, Rfl: 3    Omega-3-DHA-EPA-Fish Oil (FISH OIL) 1,000 mg (120 mg-180 mg) cap, Take  by mouth., Disp: , Rfl:     MILK THISTLE PO, Take  by mouth., Disp: , Rfl:     therapeutic multivitamin (THERAGRAN) tablet, Take 1 Tab by mouth daily. , Disp: , Rfl:     tadalafil (ADCIRCA) 20 mg tablet, Take 20 mg by mouth two (2) times a day. For lungs, Disp: , Rfl:     spironolactone (ALDACTONE) 25 mg tablet, Take 12.5 mg by mouth every morning., Disp: , Rfl:     OXYGEN-AIR DELIVERY SYSTEMS, 2 l/m continuous  Indications: 2 l/m continuous, Disp: , Rfl:     B.infantis-B.ani-B.long-B.bifi (PROBIOTIC 4X) 10-15 mg TbEC, Take  by mouth every morning., Disp: , Rfl:    Date Last Reviewed:  5/15/2018   EXAMINATION:   Observation/Orthostatic Postural Assessment: Forward head posture. Palpation:          Very tender to palpation in L paraspinals in interscapular region and L UT.   ROM:     R UE elevation: full overhead movement  L active flexion: 47 ° limited by pain  L active abduction: 40 ° limited by pain  Passive IR full, passive ER painful   Cervical AROM normal for flexion, extension, L SB, and R rotation. Painful in L UE with R SB and L rotation though R SB not painful after relaxing.   Cervical PROM: muscle guarding and spasm cause pain with R SB but once relaxed, no pain. R trunk active rotation very painful while L rotation not painful. Strength:     R UE 5/5 for all motions  L UE 5/5 for elbow flexion, extension, and IR  Shoulder flexion, abduction, and ER all painful and limited       Special Tests:          ULTT (+) for radial and median nerve bias  Lift off test painful, but able to perform. Neurological Screen:        Sensation: pt reports no abnormalities with light touch screen. Functional Mobility:         Gait/Ambulation:  WNL        Sit to stand transfer: WNL  Balance:          Not tested    Body Structures Involved:  1. Nerves  2. Joints  3. Muscles  4. Ligaments Body Functions Affected:  1. Sensory/Pain  2. Neuromusculoskeletal  3. Movement Related Activities and Participation Affected:  1. Learning and Applying Knowledge  2. General Tasks and Demands  3. Mobility  4. Self Care  5. Domestic Life  6. Interpersonal Interactions and Relationships  7. Community, Social and Civic Life   CLINICAL PRESENTATION:   CLINICAL DECISION MAKING:   Outcome Measure: Tool Used: Disabilities of the Arm, Shoulder and Hand (DASH) Questionnaire - Quick Version  Score:  Initial: 44/55  Most Recent: X/55 (Date: -- )   Interpretation of Score: The DASH is designed to measure the activities of daily living in person's with upper extremity dysfunction or pain. Each section is scored on a 1-5 scale, 5 representing the greatest disability. The scores of each section are added together for a total score of 55. Score 11 12-19 20-28 29-37 38-45 46-54 55   Modifier CH CI CJ CK CL CM CN     ?  Carrying, Moving, and Handling Objects:     - CURRENT STATUS: CL - 60%-79% impaired, limited or restricted    - GOAL STATUS: CI - 1%-19% impaired, limited or restricted    - D/C STATUS:  ---------------To be determined---------------      Medical Necessity:   · Patient is expected to demonstrate progress in strength and range of motion to increase independence with ADLs. Reason for Services/Other Comments:  · Patient has tolerated an increase in activity as demonstrated by: increased resistance/repetitions during therapeutic exercise. TREATMENT:   (In addition to Assessment/Re-Assessment sessions the following treatments were rendered)  THERAPEUTIC ACTIVITY: (See below for minutes): Therapeutic activities per grid below to improve mobility and strength. Required minimal visual, verbal, manual and tactile cues to promote coordination of left, upper extremity(s). THERAPEUTIC EXERCISE: (See below for minutes):  Exercises per grid below to improve mobility and strength. Required minimal visual, verbal, manual and tactile cues to promote proper body alignment, promote proper body posture and promote proper body mechanics. Progressed resistance, range, repetitions and complexity of movement as indicated. NEUROMUSCULAR RE-EDUCATION: (See below for minutes):  Exercise/activities per grid below to improve balance and posture. Required minimal visual, verbal, manual and tactile cues to promote coordination of left, upper extremity(s). MANUAL THERAPY: (See below for minutes): Joint mobilization, Soft tissue mobilization, Manipulation and Manual lymphatic drainage was utilized and necessary because of the patient's restricted joint motion, painful spasm, loss of articular motion and restricted motion of soft tissue. MODALITIES: (See below for minutes)    Date: 4/24/18 (visit 1) 4/27/18 (visit 2) 5/1/18 (visit 3) 5/3/18  (visit 4) 5/8/18 (visit 5) 5/10/18 (visit 6) 5/15/18  (visit 7)    Modalities:     10 min 10 min     Oxygen saturation   96% after UBE After UBE  94%         Moist hot pack     10 min to L arm/elbow 10 min to L shoulder                Therapeutic Exercise: 10 min 15 min 25 min   35mins 5 min 35 min 35 mins    S/L shoulder ER Educated on form. Instructed to keep in pain-free region.  2x10 3x10 1# 3X10       S/L shoulder flexion Educated on form. Instructed to keep in pain-free region. 2x10 3x10 3x10       S/L abduction    1# 2x10       Shoulder extension      3x10 blue grn TB 3x10    Radial nerve glide Educated on form. Instructed to keep in pain-free region. 2x10 Manual x15   Median and ulnar nerve glides x10  Median and ulnar nerve glides x10     Scapula retraction   3x10 seated Seated 2x10  Blue 2x10 grn TB 3x10    Scapular \"rows\"    grn TB 2x10       Manual stretching  PT assisted: B UT         Cervical active rotation  x10  x10 limited ROM  Upper trap stretch 10 sec hold x6 Upper trap stretch 10 sec hold x6    UBE   4 min reverse L2 to warm up L2 6 min   3 forw/3 back  L2 5 min reverse 6 min L3 forward/back 6 min L3 forward/back    Manual Therapy:  30 min 20 min 15 mins 40 min 10 min 10 mins      STM to L paraspinals    MET to R cervical extensors     L 1st rib mob Grade II PROM to cervical spine and L 1st rib mob grade I-II PROM to cervical spine   STM to L cervical  Paraspinals  Suboccipital distraction PROM to cervical spine and gentle distraction. Nerve glides performed to L UE Cervical distraction PROM to cervical spine rotation and lateral sidebending,  and gentle distraction. Nerve glides performed to L UE               Therapeutic Activities:           Education on log rolling      5 min                                      HEP: See above chart. [employer] Portal    Shoulder elevation ROM: pt able to flex and abduct L UE to ~90 °, per visual estimation. Treatment/Session Assessment: Pt reports continued episodes of sharp pain in L shoulder which radiates both into neck and head and into wrist and hand. Pt reports night pain causing restless sleeping due to shoulder pain with positioning. Continue POC. · Pain/ Symptoms: Initial:  0/10 L elbow at rest    She continues to report episodes of  L hand going numb. She continues to experience sudden, episodes of sharp pain, that often occur unexpectedly.      Pt is going out of town next week (5/22) and will not return until the first week of July. Post Session: intermittent pain in L elbow ·   Compliance with Program/Exercises: Will assess as treatment progresses. · Recommendations/Intent for next treatment session: \"Next visit will focus on advancements to more challenging activities\".   ·   Total Treatment Duration:  PT Patient Time In/Time Out  Time In: 1445  Time Out: 2321 Perri Dumont, PTA

## 2018-05-18 ENCOUNTER — HOSPITAL ENCOUNTER (OUTPATIENT)
Dept: PHYSICAL THERAPY | Age: 78
Discharge: HOME | End: 2018-05-18
Payer: MEDICARE

## 2018-05-18 PROCEDURE — 97140 MANUAL THERAPY 1/> REGIONS: CPT

## 2018-05-18 PROCEDURE — 97110 THERAPEUTIC EXERCISES: CPT

## 2018-05-18 NOTE — PROGRESS NOTES
Lucero Abrahan  : [id number] [facility],[address]  [address] [place] [address].  [phone]   [phone]        OUTPATIENT PHYSICAL THERAPY:Daily Note 2018        ICD-10: Treatment Diagnosis: Pain in left shoulder (M25.512)  Precautions/Allergies: Other food; Esomeprazole magnesium; Iodinated contrast- oral and iv dye; Iodine and iodide containing products; Shellfish derived; Amitriptyline; Amlodipine; Amoxicillin; Benicar [olmesartan]; Bystolic [nebivolol]; Flecainide; Hydrochlorothiazide; Ibuprofen; Iodine; Keflex [cephalexin]; Levofloxacin (bulk); Lisinopril; Lisinopril-hydrochlorothiazide; Nexium [esomeprazole magnesium]; Nitrofurantoin; Nitrofurantoin macrocrystal; Nitrofurantoin macrocrystalline; and Sulfa (sulfonamide antibiotics)   Fall Risk Score: 1 (? 5 = High Risk)  MD Orders: Evaluate and treat MEDICAL/REFERRING DIAGNOSIS:  Other specified disorders of bone, shoulder [M89.8X1]  DATE OF ONSET: 18  REFERRING PHYSICIAN: Shahzad Andrade MD  RETURN PHYSICIAN APPOINTMENT: To be determined      INITIAL ASSESSMENT:  Ms. Scott Lopez presents with L UE pain in shoulder that extends to elbow with radicular symptoms into first 2 fingers, limited shoulder ROM, and weakness secondary to pain which is limiting ability to reach overhead and behind back which restricts ability to fully participate in ADLs such as reaching a shelf overhead, dressing such as pulling shirt on, and toileting self-care activities. Pt is a good candidate for skilled PT in order to improve symptoms, strength, and ROM in order to return to prior level of function. PROBLEM LIST (Impacting functional limitations):  1. Decreased Strength  2. Decreased ADL/Functional Activities  3. Increased Pain  4. Decreased Activity Tolerance  5. Decreased Flexibility/Joint Mobility INTERVENTIONS PLANNED:  1. Cryotherapy  2. Electrical Stimulation  3. Heat  4.  Home Exercise Program (HEP)  5. Manual Therapy  6. Neuromuscular Re-education/Strengthening  7. Range of Motion (ROM)  8. Therapeutic Activites  9. Therapeutic Exercise/Strengthening   TREATMENT PLAN:  Effective Dates: 4/24/18 TO 6/5/2018. Frequency/Duration: 2 times a week for 6 weeks  GOALS: (Goals have been discussed and agreed upon with patient.)  Short-Term Functional Goals: Time Frame: 3 weeks  1. Pt will report compliance with home program in order to improve strength and ROM. 2. Pt will improve active elevation in L UE to at least 90 ° to improve ability with reaching ADLs. 3. Pt will improve pain to be able to sleep through night. Discharge Goals: Time Frame: 6 weeks  1. Pt will be able to elevated L UE to 140 ° to be able to reach overhead for ADLs such as dressing. 2. Pt will be able to reach behind back to be able to perform self-care ADLs. 3. Pt will improve strength to 5/5 for all shoulder motions to be able to perform ADLs requiring object manipulation liking placing item into shelf and carrying. Rehabilitation Potential For Stated Goals: Good                HISTORY:   History of Present Injury/Illness (Reason for Referral):  Pt reports that she was working in her garden 2 weeks ago and this vigorous activity aggravated her shoulder. It has been getting worse ever since. She reports she has chronic diarrhea and that reaching behind her for toileting self-care activities is painful in L arm, regardless of arm used. She reports pain in entire L upper quarter and radicular symptoms sometimes into first two fingers. Past Medical History/Comorbidities:   Ms. Michela Baeza  has a past medical history of Acute on chronic combined systolic and diastolic heart failure ([address] (6/11/2015); Acute renal failure (ARF) ([address] (6/13/2015); Adiposity (11/10/2015); Arthritis (4/9/2014); Bilateral carotid artery stenosis; CAD (coronary artery disease) (4/9/2014); Chronic kidney disease; Coagulation disorder ([address]  Congestive heart failure (CHF) ([facility] Dyspnea (6/11/2015); Dyspnea on exertion (4/9/2014); GERD (gastroesophageal reflux disease); History of ITP; Hyperlipidemia (4/9/2014); Hypertension (4/9/2014); Hyponatremia (6/11/2015); Hypothyroid; Hypothyroidism (6/11/2015); Hypoxemia (7/16/2014); Left atrial dilation; Liver disease; Lung nodule (4/9/2014); MI (myocardial infarction) ([facility] Nausea and vomiting (6/13/2016); Other ill-defined conditions(799.89); Pulmonary fibrosis ([facility] (4/9/2014); Pulmonary hypertension ([facility] Pulmonary valve regurgitation; PVC (premature ventricular contraction) (4/25/2014); Raynaud's disease (4/9/2014); Right atrial dilation; Severe tricuspid regurgitation; Sicca syndrome, Sjogren's ([facility] (4/9/2015); and Thrombocytopenia ([facility] (10/12/2015). She also has no past medical history of Adverse effect of anesthesia; Difficult intubation; Malignant hyperthermia due to anesthesia; Pseudocholinesterase deficiency; or Unspecified adverse effect of anesthesia. Ms. Earlene Falcon  has a past surgical history that includes hx hysterectomy; hx appendectomy; hx tonsil and adenoidectomy; hx orthopaedic; hx hernia repair; hx colonoscopy; and pr colonoscopy w/biopsy single/multiple (12/13/2013). Social History/Living Environment:     Pt lives at home with her significant other and has a walk-in shower. Prior Level of Function/Work/Activity:  Pt does not work but is independent with all ADLs prior to injury. She reports she likes to workout on exercise machine 20 min 4x/week. Dominant Side:         RIGHT  Current Medications:    Current Outpatient Prescriptions:     magnesium oxide (MAG-OX) 400 mg tablet, Take 1 Tab by mouth two (2) times a day., Disp: 180 Tab, Rfl: 3    ketoprofen, micronized 10 % cmap, by Apply Externally route., Disp: , Rfl:     zaleplon (SONATA) 10 mg capsule, Take 1 Cap by mouth nightly as needed.  Max Daily Amount: 10 mg., Disp: 30 Cap, Rfl: 5    cholecalciferol, vitamin D3, 2,000 unit tab, Take 1 Tab by mouth every morning., Disp: 90 Tab, Rfl: 3    OTHER, Ketoprofen 10%. Apply 3 to 4 times daily  Compounded cream RX # [id number], Disp: 60 g, Rfl: prn    ondansetron (ZOFRAN ODT) 4 mg disintegrating tablet, Take 1 Tab by mouth every six (6) hours as needed for Nausea., Disp: 15 Tab, Rfl: 0    furosemide (LASIX) 20 mg tablet, Take 1 Tab by mouth daily. , Disp: 90 Tab, Rfl: 3    vit a,c & e-lutein-minerals (HEALTHY EYES) tablet, Take 2 Tabs by mouth daily. thera tears, Disp: , Rfl:     estradiol (ESTRACE) 0.5 mg tablet, Take 1 Tab by mouth daily. , Disp: 90 Tab, Rfl: 3    levothyroxine (SYNTHROID) 112 mcg tablet, Take 1 Tab by mouth Daily (before breakfast). Indications: hypothyroidism, Disp: 90 Tab, Rfl: 3    Omega-3-DHA-EPA-Fish Oil (FISH OIL) 1,000 mg (120 mg-180 mg) cap, Take  by mouth., Disp: , Rfl:     MILK THISTLE PO, Take  by mouth., Disp: , Rfl:     therapeutic multivitamin (THERAGRAN) tablet, Take 1 Tab by mouth daily. , Disp: , Rfl:     tadalafil (ADCIRCA) 20 mg tablet, Take 20 mg by mouth two (2) times a day. For lungs, Disp: , Rfl:     spironolactone (ALDACTONE) 25 mg tablet, Take 12.5 mg by mouth every morning., Disp: , Rfl:     OXYGEN-AIR DELIVERY SYSTEMS, 2 l/m continuous  Indications: 2 l/m continuous, Disp: , Rfl:     B.infantis-B.ani-B.long-B.bifi (PROBIOTIC 4X) 10-15 mg TbEC, Take  by mouth every morning., Disp: , Rfl:    Date Last Reviewed:  5/18/2018   EXAMINATION:   Observation/Orthostatic Postural Assessment: Forward head posture. Palpation:          Very tender to palpation in L paraspinals in interscapular region and L UT.   ROM:     R UE elevation: full overhead movement  L active flexion: 47 ° limited by pain  L active abduction: 40 ° limited by pain  Passive IR full, passive ER painful   Cervical AROM normal for flexion, extension, L SB, and R rotation. Painful in L UE with R SB and L rotation though R SB not painful after relaxing.   Cervical PROM: muscle guarding and spasm cause pain with R SB but once relaxed, no pain. R trunk active rotation very painful while L rotation not painful. Strength:     R UE 5/5 for all motions  L UE 5/5 for elbow flexion, extension, and IR  Shoulder flexion, abduction, and ER all painful and limited       Special Tests:          ULTT (+) for radial and median nerve bias  Lift off test painful, but able to perform. Neurological Screen:        Sensation: pt reports no abnormalities with light touch screen. Functional Mobility:         Gait/Ambulation:  WNL        Sit to stand transfer: WNL  Balance:          Not tested    Body Structures Involved:  1. Nerves  2. Joints  3. Muscles  4. Ligaments Body Functions Affected:  1. Sensory/Pain  2. Neuromusculoskeletal  3. Movement Related Activities and Participation Affected:  1. Learning and Applying Knowledge  2. General Tasks and Demands  3. Mobility  4. Self Care  5. Domestic Life  6. Interpersonal Interactions and Relationships  7. Community, Social and Civic Life   CLINICAL PRESENTATION:   CLINICAL DECISION MAKING:   Outcome Measure: Tool Used: Disabilities of the Arm, Shoulder and Hand (DASH) Questionnaire - Quick Version  Score:  Initial: 44/55  Most Recent: X/55 (Date: -- )   Interpretation of Score: The DASH is designed to measure the activities of daily living in person's with upper extremity dysfunction or pain. Each section is scored on a 1-5 scale, 5 representing the greatest disability. The scores of each section are added together for a total score of 55. Score 11 12-19 20-28 29-37 38-45 46-54 55   Modifier CH CI CJ CK CL CM CN     ?  Carrying, Moving, and Handling Objects:     - CURRENT STATUS: CL - 60%-79% impaired, limited or restricted    - GOAL STATUS: CI - 1%-19% impaired, limited or restricted    - D/C STATUS:  ---------------To be determined---------------      Medical Necessity:   · Patient is expected to demonstrate progress in strength and range of motion to increase independence with ADLs. Reason for Services/Other Comments:  · Patient has tolerated an increase in activity as demonstrated by: increased resistance/repetitions during therapeutic exercise. TREATMENT:   (In addition to Assessment/Re-Assessment sessions the following treatments were rendered)  THERAPEUTIC ACTIVITY: (See below for minutes): Therapeutic activities per grid below to improve mobility and strength. Required minimal visual, verbal, manual and tactile cues to promote coordination of left, upper extremity(s). THERAPEUTIC EXERCISE: (See below for minutes):  Exercises per grid below to improve mobility and strength. Required minimal visual, verbal, manual and tactile cues to promote proper body alignment, promote proper body posture and promote proper body mechanics. Progressed resistance, range, repetitions and complexity of movement as indicated. NEUROMUSCULAR RE-EDUCATION: (See below for minutes):  Exercise/activities per grid below to improve balance and posture. Required minimal visual, verbal, manual and tactile cues to promote coordination of left, upper extremity(s). MANUAL THERAPY: (See below for minutes): Joint mobilization, Soft tissue mobilization, Manipulation and Manual lymphatic drainage was utilized and necessary because of the patient's restricted joint motion, painful spasm, loss of articular motion and restricted motion of soft tissue. MODALITIES: (See below for minutes)    Date: 4/24/18 (visit 1) 4/27/18 (visit 2) 5/1/18 (visit 3) 5/3/18  (visit 4) 5/8/18 (visit 5) 5/10/18 (visit 6) 5/15/18  (visit 7) 05/18/18  (visit 8)   Modalities:     10 min 10 min       Oxygen saturation   96% after UBE After UBE  94%         Moist hot pack     10 min to L arm/elbow 10 min to L shoulder  Deferred by pt, will use heat at home              Therapeutic Exercise: 10 min 15 min 25 min   35mins 5 min 35 min 35 mins   30 mins   S/L shoulder ER Educated on form.  Instructed to keep in pain-free region. 2x10 3x10 1# 3X10    1# 2x10   S/L shoulder flexion Educated on form. Instructed to keep in pain-free region. 2x10 3x10 3x10    2x10   S/L abduction    1# 2x10    1# 2x10   Shoulder extension      3x10 blue grn TB 3x10 grn TB 3x10   Radial nerve glide Educated on form. Instructed to keep in pain-free region. 2x10 Manual x15   Median and ulnar nerve glides x10  Median and ulnar nerve glides x10  Median and ulnar nerve glides x10   Scapula retraction   3x10 seated Seated 2x10  Blue 2x10 grn TB 3x10 Isometric x10H5   Scapular \"rows\"    grn TB 2x10    grn TB 2x10   Manual stretching  PT assisted: B UT         Cervical active rotation  x10  x10 limited ROM  Upper trap stretch 10 sec hold x6 Upper trap stretch 10 sec hold x6 ROM and stretch    UBE   4 min reverse L2 to warm up L2 6 min   3 forw/3 back  L2 5 min reverse 6 min L3 forward/back 6 min L3 forward/back 6 min L3 forward/back   Manual Therapy:  30 min 20 min 15 mins 40 min 10 min 10 mins 15 mins     STM to L paraspinals    MET to R cervical extensors     L 1st rib mob Grade II PROM to cervical spine and L 1st rib mob grade I-II PROM to cervical spine   STM to L cervical  Paraspinals  Suboccipital distraction PROM to cervical spine and gentle distraction. Nerve glides performed to L UE Cervical distraction PROM to cervical spine rotation and lateral sidebending,  and gentle distraction. Nerve glides performed to L UE PROM to cervical spine rotation and lateral sidebending,  and gentle distraction. Nerve glides performed to L UE              Therapeutic Activities:           Education on log rolling      5 min                                      HEP: See above chart. [employer] Portal    Shoulder elevation ROM: pt able to flex and abduct L UE to ~90 °, per visual estimation. Treatment/Session Assessment: Pt reports continued episodes of sharp pain in L shoulder which radiates both into neck and head and into elbow.   Pt say has experienced less episodes of pain toward into wrist and hand. Pt reports night pain causing restless sleeping due to shoulder pain with positioning. Pt is going out of town next week (5/22) and will not return until the first week of July. Pt notes if pain continues she will seek more specific Dx with doctor upon her return. [place] [facility] provided for to perform while out of town. · Pain/ Symptoms: Initial:  7/10 L elbow and upper UE    Pt says felt good after previous tx until last night, \"had a terrible night\" with increase of pain. She continues to report episodes of  L hand going numb, however, over the last 2 days less frequent. She continues to experience sudden, episodes of sharp pain, that often occur unexpectedly, she feels it has positional occurrence, however, cannot predict. .     Pt is going out of town next week (5/22) and will not return until the first week of July. Post Session: intermittent pain in L elbow ·   Compliance with Program/Exercises: Will assess as treatment progresses. · Recommendations/Intent for next treatment session: \"Next visit will focus on advancements to more challenging activities\".   ·   Total Treatment Duration:  PT Patient Time In/Time Out  Time In: 0930  Time Out: 2321 Perri Dumont, PTA

## 2018-06-13 NOTE — PROGRESS NOTES
Kaylah Frederick has been seen in physical therapy from 04/24/18 to 05/18/18 for 8 visits. Treatment has been discontinued at this time due to patient travelling out of town until the beginning of July. Will reassess necessity of therapy on her return.   Thank you for this referral.

## 2018-06-18 ENCOUNTER — HOSPITAL ENCOUNTER (OUTPATIENT)
Dept: GENERAL RADIOLOGY | Age: 78
Discharge: HOME | End: 2018-06-18
Payer: MEDICARE

## 2018-06-18 ENCOUNTER — HOSPITAL ENCOUNTER (OUTPATIENT)
Dept: MAMMOGRAPHY | Age: 78
Discharge: HOME | End: 2018-06-18
Payer: MEDICARE

## 2018-06-18 DIAGNOSIS — M54.2 NECK PAIN: ICD-10-CM

## 2018-06-18 DIAGNOSIS — M25.512 CHRONIC LEFT SHOULDER PAIN: ICD-10-CM

## 2018-06-18 DIAGNOSIS — Z12.31 SCREENING MAMMOGRAM, ENCOUNTER FOR: ICD-10-CM

## 2018-06-18 DIAGNOSIS — G89.29 CHRONIC LEFT SHOULDER PAIN: ICD-10-CM

## 2018-06-18 PROCEDURE — 72050 X-RAY EXAM NECK SPINE 4/5VWS: CPT

## 2018-06-18 PROCEDURE — 73030 X-RAY EXAM OF SHOULDER: CPT

## 2018-06-18 PROCEDURE — 77067 SCR MAMMO BI INCL CAD: CPT

## 2018-06-18 NOTE — PROGRESS NOTES
The shoulder x-ray shows mild degenerative changes. The neck x-ray shows arthritic changes. Recommend MRI of the neck.

## 2018-06-19 ENCOUNTER — HOSPITAL ENCOUNTER (OUTPATIENT)
Dept: LAB | Age: 78
Discharge: HOME | End: 2018-06-19
Attending: INTERNAL MEDICINE
Payer: MEDICARE

## 2018-06-19 DIAGNOSIS — I42.0 CARDIOMYOPATHY, DILATED (HCC): Chronic | ICD-10-CM

## 2018-06-19 LAB
ALBUMIN SERPL-MCNC: 3.8 G/DL (ref 3.2–4.6)
ALBUMIN/GLOB SERPL: 1 {RATIO} (ref 1.2–3.5)
ALP SERPL-CCNC: 56 U/L (ref 50–136)
ALT SERPL-CCNC: 15 U/L (ref 12–65)
ANION GAP SERPL CALC-SCNC: 6 MMOL/L (ref 7–16)
AST SERPL-CCNC: 22 U/L (ref 15–37)
BILIRUB SERPL-MCNC: 0.7 MG/DL (ref 0.2–1.1)
BNP SERPL-MCNC: 95 PG/ML
BUN SERPL-MCNC: 18 MG/DL (ref 8–23)
CALCIUM SERPL-MCNC: 10.1 MG/DL (ref 8.3–10.4)
CHLORIDE SERPL-SCNC: 101 MMOL/L (ref 98–107)
CO2 SERPL-SCNC: 29 MMOL/L (ref 21–32)
CREAT SERPL-MCNC: 1.06 MG/DL (ref 0.6–1)
GLOBULIN SER CALC-MCNC: 4 G/DL (ref 2.3–3.5)
GLUCOSE SERPL-MCNC: 85 MG/DL (ref 65–100)
MAGNESIUM SERPL-MCNC: 1.9 MG/DL (ref 1.8–2.4)
POTASSIUM SERPL-SCNC: 3.8 MMOL/L (ref 3.5–5.1)
PROT SERPL-MCNC: 7.8 G/DL (ref 6.3–8.2)
SODIUM SERPL-SCNC: 136 MMOL/L (ref 136–145)

## 2018-06-19 PROCEDURE — 83880 ASSAY OF NATRIURETIC PEPTIDE: CPT | Performed by: INTERNAL MEDICINE

## 2018-06-19 PROCEDURE — 36415 COLL VENOUS BLD VENIPUNCTURE: CPT | Performed by: INTERNAL MEDICINE

## 2018-06-19 PROCEDURE — 80053 COMPREHEN METABOLIC PANEL: CPT | Performed by: INTERNAL MEDICINE

## 2018-06-19 PROCEDURE — 83735 ASSAY OF MAGNESIUM: CPT | Performed by: INTERNAL MEDICINE

## 2018-06-20 NOTE — PROGRESS NOTES
Please call her, labs are better now. BNP lower. K ok. How is she feeling on higher dose lasix and aldactone? Less REYES, less SOB?   Thanks

## 2018-06-20 NOTE — PROGRESS NOTES
I spoke with the patient and informed her of her lab results. She stated that she is feeling much better and having less REYES and SOB. Patient verbalized understanding.

## 2018-06-25 ENCOUNTER — HOSPITAL ENCOUNTER (OUTPATIENT)
Dept: MRI IMAGING | Age: 78
Discharge: HOME | End: 2018-06-25
Payer: MEDICARE

## 2018-06-25 DIAGNOSIS — M54.2 NECK PAIN: ICD-10-CM

## 2018-06-25 DIAGNOSIS — M25.512 CHRONIC LEFT SHOULDER PAIN: ICD-10-CM

## 2018-06-25 DIAGNOSIS — G89.29 CHRONIC LEFT SHOULDER PAIN: ICD-10-CM

## 2018-06-25 PROCEDURE — 72141 MRI NECK SPINE W/O DYE: CPT

## 2018-06-28 NOTE — PROGRESS NOTES
There are chronic changes but the right side has worse foraminal narrowing (where the nerve is exiting the canal).

## 2018-07-03 ENCOUNTER — HOSPITAL ENCOUNTER (OUTPATIENT)
Dept: CT IMAGING | Age: 78
Discharge: HOME | End: 2018-07-03
Attending: UROLOGY
Payer: MEDICARE

## 2018-07-03 VITALS — BODY MASS INDEX: 30.78 KG/M2 | HEIGHT: 61 IN | WEIGHT: 163 LBS

## 2018-07-03 DIAGNOSIS — R31.0 GROSS HEMATURIA: ICD-10-CM

## 2018-07-03 PROCEDURE — 74178 CT ABD&PLV WO CNTR FLWD CNTR: CPT

## 2018-07-03 PROCEDURE — 74011636320 HC RX REV CODE- 636/320: Performed by: UROLOGY

## 2018-07-03 PROCEDURE — 74011000258 HC RX REV CODE- 258: Performed by: UROLOGY

## 2018-07-03 RX ORDER — SODIUM CHLORIDE 0.9 % (FLUSH) 0.9 %
10 SYRINGE (ML) INJECTION
Status: COMPLETED | OUTPATIENT
Start: 2018-07-03 | End: 2018-07-03

## 2018-07-03 RX ADMIN — IOPAMIDOL 100 ML: 755 INJECTION, SOLUTION INTRAVENOUS at 14:14

## 2018-07-03 RX ADMIN — Medication 10 ML: at 14:15

## 2018-07-03 RX ADMIN — SODIUM CHLORIDE 100 ML: 900 INJECTION, SOLUTION INTRAVENOUS at 14:15

## 2018-07-23 ENCOUNTER — APPOINTMENT (OUTPATIENT)
Dept: GENERAL RADIOLOGY | Age: 78
DRG: 194 | End: 2018-07-23
Attending: EMERGENCY MEDICINE
Payer: MEDICARE

## 2018-07-23 ENCOUNTER — HOSPITAL ENCOUNTER (INPATIENT)
Age: 78
LOS: 4 days | Discharge: HOME HEALTH | DRG: 194 | End: 2018-07-27
Attending: EMERGENCY MEDICINE | Admitting: INTERNAL MEDICINE
Payer: MEDICARE

## 2018-07-23 DIAGNOSIS — J18.9 COMMUNITY ACQUIRED PNEUMONIA OF LEFT LOWER LOBE OF LUNG: Primary | ICD-10-CM

## 2018-07-23 PROBLEM — H10.30 ACUTE CONJUNCTIVITIS: Status: ACTIVE | Noted: 2018-07-23

## 2018-07-23 PROBLEM — R77.8 ELEVATED TROPONIN: Status: ACTIVE | Noted: 2018-07-23

## 2018-07-23 PROBLEM — J15.9 COMMUNITY ACQUIRED BACTERIAL PNEUMONIA: Status: ACTIVE | Noted: 2018-07-23

## 2018-07-23 LAB
ALBUMIN SERPL-MCNC: 3.5 G/DL (ref 3.2–4.6)
ALBUMIN/GLOB SERPL: 0.8 {RATIO} (ref 1.2–3.5)
ALP SERPL-CCNC: 94 U/L (ref 50–136)
ALT SERPL-CCNC: 20 U/L (ref 12–65)
ANION GAP SERPL CALC-SCNC: 10 MMOL/L (ref 7–16)
AST SERPL-CCNC: 26 U/L (ref 15–37)
ATRIAL RATE: 104 BPM
BASOPHILS # BLD: 0 K/UL (ref 0–0.2)
BASOPHILS NFR BLD: 0 % (ref 0–2)
BILIRUB SERPL-MCNC: 1 MG/DL (ref 0.2–1.1)
BNP SERPL-MCNC: 173 PG/ML
BUN SERPL-MCNC: 13 MG/DL (ref 8–23)
CALCIUM SERPL-MCNC: 9.6 MG/DL (ref 8.3–10.4)
CALCULATED P AXIS, ECG09: 56 DEGREES
CALCULATED R AXIS, ECG10: 43 DEGREES
CALCULATED T AXIS, ECG11: 17 DEGREES
CHLORIDE SERPL-SCNC: 97 MMOL/L (ref 98–107)
CO2 SERPL-SCNC: 27 MMOL/L (ref 21–32)
CREAT SERPL-MCNC: 1.16 MG/DL (ref 0.6–1)
DIAGNOSIS, 93000: NORMAL
DIFFERENTIAL METHOD BLD: ABNORMAL
EOSINOPHIL # BLD: 0.2 K/UL (ref 0–0.8)
EOSINOPHIL NFR BLD: 1 % (ref 0.5–7.8)
ERYTHROCYTE [DISTWIDTH] IN BLOOD BY AUTOMATED COUNT: 13.9 % (ref 11.9–14.6)
GLOBULIN SER CALC-MCNC: 4.6 G/DL (ref 2.3–3.5)
GLUCOSE SERPL-MCNC: 126 MG/DL (ref 65–100)
HCT VFR BLD AUTO: 42.9 % (ref 35.8–46.3)
HGB BLD-MCNC: 14.2 G/DL (ref 11.7–15.4)
IMM GRANULOCYTES # BLD: 0.1 K/UL (ref 0–0.5)
IMM GRANULOCYTES NFR BLD AUTO: 1 % (ref 0–5)
LYMPHOCYTES # BLD: 3.6 K/UL (ref 0.5–4.6)
LYMPHOCYTES NFR BLD: 24 % (ref 13–44)
MCH RBC QN AUTO: 31.5 PG (ref 26.1–32.9)
MCHC RBC AUTO-ENTMCNC: 33.1 G/DL (ref 31.4–35)
MCV RBC AUTO: 95.1 FL (ref 79.6–97.8)
MONOCYTES # BLD: 2.2 K/UL (ref 0.1–1.3)
MONOCYTES NFR BLD: 15 % (ref 4–12)
NEUTS SEG # BLD: 8.7 K/UL (ref 1.7–8.2)
NEUTS SEG NFR BLD: 59 % (ref 43–78)
P-R INTERVAL, ECG05: 178 MS
PLATELET # BLD AUTO: 284 K/UL (ref 150–450)
PMV BLD AUTO: 9.4 FL (ref 10.8–14.1)
POTASSIUM SERPL-SCNC: 3.7 MMOL/L (ref 3.5–5.1)
PROT SERPL-MCNC: 8.1 G/DL (ref 6.3–8.2)
Q-T INTERVAL, ECG07: 362 MS
QRS DURATION, ECG06: 128 MS
QTC CALCULATION (BEZET), ECG08: 476 MS
RBC # BLD AUTO: 4.51 M/UL (ref 4.05–5.25)
SODIUM SERPL-SCNC: 134 MMOL/L (ref 136–145)
TROPONIN I BLD-MCNC: 0.79 NG/ML (ref 0.02–0.05)
TROPONIN I SERPL-MCNC: 0.73 NG/ML (ref 0.02–0.05)
TROPONIN I SERPL-MCNC: 0.85 NG/ML (ref 0.02–0.05)
VENTRICULAR RATE, ECG03: 104 BPM
WBC # BLD AUTO: 14.7 K/UL (ref 4.3–11.1)

## 2018-07-23 PROCEDURE — 74011000250 HC RX REV CODE- 250: Performed by: EMERGENCY MEDICINE

## 2018-07-23 PROCEDURE — 84484 ASSAY OF TROPONIN QUANT: CPT | Performed by: INTERNAL MEDICINE

## 2018-07-23 PROCEDURE — 80053 COMPREHEN METABOLIC PANEL: CPT | Performed by: EMERGENCY MEDICINE

## 2018-07-23 PROCEDURE — 74011000258 HC RX REV CODE- 258: Performed by: EMERGENCY MEDICINE

## 2018-07-23 PROCEDURE — 74011250637 HC RX REV CODE- 250/637: Performed by: INTERNAL MEDICINE

## 2018-07-23 PROCEDURE — 74011250636 HC RX REV CODE- 250/636: Performed by: EMERGENCY MEDICINE

## 2018-07-23 PROCEDURE — 36415 COLL VENOUS BLD VENIPUNCTURE: CPT | Performed by: INTERNAL MEDICINE

## 2018-07-23 PROCEDURE — 96374 THER/PROPH/DIAG INJ IV PUSH: CPT | Performed by: EMERGENCY MEDICINE

## 2018-07-23 PROCEDURE — 65660000000 HC RM CCU STEPDOWN

## 2018-07-23 PROCEDURE — 74011250636 HC RX REV CODE- 250/636: Performed by: INTERNAL MEDICINE

## 2018-07-23 PROCEDURE — 77030013140 HC MSK NEB VYRM -A

## 2018-07-23 PROCEDURE — 84484 ASSAY OF TROPONIN QUANT: CPT

## 2018-07-23 PROCEDURE — 74011000250 HC RX REV CODE- 250: Performed by: INTERNAL MEDICINE

## 2018-07-23 PROCEDURE — 83880 ASSAY OF NATRIURETIC PEPTIDE: CPT | Performed by: EMERGENCY MEDICINE

## 2018-07-23 PROCEDURE — 94640 AIRWAY INHALATION TREATMENT: CPT

## 2018-07-23 PROCEDURE — 93005 ELECTROCARDIOGRAM TRACING: CPT | Performed by: EMERGENCY MEDICINE

## 2018-07-23 PROCEDURE — 99285 EMERGENCY DEPT VISIT HI MDM: CPT | Performed by: EMERGENCY MEDICINE

## 2018-07-23 PROCEDURE — 87040 BLOOD CULTURE FOR BACTERIA: CPT | Performed by: EMERGENCY MEDICINE

## 2018-07-23 PROCEDURE — 85025 COMPLETE CBC W/AUTO DIFF WBC: CPT | Performed by: EMERGENCY MEDICINE

## 2018-07-23 PROCEDURE — 71045 X-RAY EXAM CHEST 1 VIEW: CPT

## 2018-07-23 RX ORDER — IPRATROPIUM BROMIDE AND ALBUTEROL SULFATE 2.5; .5 MG/3ML; MG/3ML
3 SOLUTION RESPIRATORY (INHALATION)
Status: COMPLETED | OUTPATIENT
Start: 2018-07-23 | End: 2018-07-23

## 2018-07-23 RX ORDER — SODIUM CHLORIDE 0.9 % (FLUSH) 0.9 %
5-10 SYRINGE (ML) INJECTION EVERY 8 HOURS
Status: DISCONTINUED | OUTPATIENT
Start: 2018-07-23 | End: 2018-07-27 | Stop reason: HOSPADM

## 2018-07-23 RX ORDER — HEPARIN SODIUM 5000 [USP'U]/ML
5000 INJECTION, SOLUTION INTRAVENOUS; SUBCUTANEOUS EVERY 8 HOURS
Status: DISCONTINUED | OUTPATIENT
Start: 2018-07-23 | End: 2018-07-27 | Stop reason: HOSPADM

## 2018-07-23 RX ORDER — DIPHENHYDRAMINE HYDROCHLORIDE 50 MG/ML
12.5 INJECTION, SOLUTION INTRAMUSCULAR; INTRAVENOUS
Status: DISCONTINUED | OUTPATIENT
Start: 2018-07-23 | End: 2018-07-26

## 2018-07-23 RX ORDER — LOPERAMIDE HYDROCHLORIDE 2 MG/1
2 CAPSULE ORAL
Status: DISCONTINUED | OUTPATIENT
Start: 2018-07-23 | End: 2018-07-27 | Stop reason: HOSPADM

## 2018-07-23 RX ORDER — GENTAMICIN SULFATE 3 MG/ML
1 SOLUTION/ DROPS OPHTHALMIC 4 TIMES DAILY
Status: DISCONTINUED | OUTPATIENT
Start: 2018-07-23 | End: 2018-07-27 | Stop reason: HOSPADM

## 2018-07-23 RX ORDER — ZOLPIDEM TARTRATE 5 MG/1
5 TABLET ORAL
Status: DISCONTINUED | OUTPATIENT
Start: 2018-07-23 | End: 2018-07-27 | Stop reason: HOSPADM

## 2018-07-23 RX ORDER — SPIRONOLACTONE 25 MG/1
12.5 TABLET ORAL
Status: DISCONTINUED | OUTPATIENT
Start: 2018-07-23 | End: 2018-07-27 | Stop reason: HOSPADM

## 2018-07-23 RX ORDER — AMOXICILLIN 250 MG
1 CAPSULE ORAL DAILY
Status: DISCONTINUED | OUTPATIENT
Start: 2018-07-23 | End: 2018-07-23

## 2018-07-23 RX ORDER — SODIUM CHLORIDE 0.9 % (FLUSH) 0.9 %
5-10 SYRINGE (ML) INJECTION AS NEEDED
Status: DISCONTINUED | OUTPATIENT
Start: 2018-07-23 | End: 2018-07-27 | Stop reason: HOSPADM

## 2018-07-23 RX ORDER — NALOXONE HYDROCHLORIDE 0.4 MG/ML
0.4 INJECTION, SOLUTION INTRAMUSCULAR; INTRAVENOUS; SUBCUTANEOUS AS NEEDED
Status: DISCONTINUED | OUTPATIENT
Start: 2018-07-23 | End: 2018-07-27 | Stop reason: HOSPADM

## 2018-07-23 RX ORDER — FUROSEMIDE 20 MG/1
20 TABLET ORAL DAILY
Status: DISCONTINUED | OUTPATIENT
Start: 2018-07-23 | End: 2018-07-27 | Stop reason: HOSPADM

## 2018-07-23 RX ORDER — LANOLIN ALCOHOL/MO/W.PET/CERES
400 CREAM (GRAM) TOPICAL 2 TIMES DAILY
Status: DISCONTINUED | OUTPATIENT
Start: 2018-07-23 | End: 2018-07-27 | Stop reason: HOSPADM

## 2018-07-23 RX ORDER — DILTIAZEM HYDROCHLORIDE 30 MG/1
30 TABLET, FILM COATED ORAL
Status: DISCONTINUED | OUTPATIENT
Start: 2018-07-23 | End: 2018-07-25

## 2018-07-23 RX ORDER — ONDANSETRON 2 MG/ML
4 INJECTION INTRAMUSCULAR; INTRAVENOUS
Status: DISCONTINUED | OUTPATIENT
Start: 2018-07-23 | End: 2018-07-27 | Stop reason: HOSPADM

## 2018-07-23 RX ORDER — SODIUM CHLORIDE 9 MG/ML
500 INJECTION, SOLUTION INTRAVENOUS ONCE
Status: COMPLETED | OUTPATIENT
Start: 2018-07-23 | End: 2018-07-23

## 2018-07-23 RX ORDER — LEVOTHYROXINE SODIUM 112 UG/1
112 TABLET ORAL
Status: DISCONTINUED | OUTPATIENT
Start: 2018-07-23 | End: 2018-07-27 | Stop reason: HOSPADM

## 2018-07-23 RX ORDER — HYDROCODONE BITARTRATE AND ACETAMINOPHEN 5; 325 MG/1; MG/1
1 TABLET ORAL
Status: DISCONTINUED | OUTPATIENT
Start: 2018-07-23 | End: 2018-07-27 | Stop reason: HOSPADM

## 2018-07-23 RX ORDER — TADALAFIL 20 MG/1
40 TABLET ORAL DAILY
Status: DISCONTINUED | OUTPATIENT
Start: 2018-07-24 | End: 2018-07-23

## 2018-07-23 RX ORDER — GUAIFENESIN 600 MG/1
600 TABLET, EXTENDED RELEASE ORAL 2 TIMES DAILY
Status: DISCONTINUED | OUTPATIENT
Start: 2018-07-23 | End: 2018-07-24

## 2018-07-23 RX ORDER — TADALAFIL 20 MG/1
40 TABLET ORAL DAILY
Status: DISCONTINUED | OUTPATIENT
Start: 2018-07-23 | End: 2018-07-27 | Stop reason: HOSPADM

## 2018-07-23 RX ORDER — ACETAMINOPHEN 325 MG/1
650 TABLET ORAL
Status: DISCONTINUED | OUTPATIENT
Start: 2018-07-23 | End: 2018-07-27 | Stop reason: HOSPADM

## 2018-07-23 RX ORDER — MELATONIN
2000 DAILY
Status: DISCONTINUED | OUTPATIENT
Start: 2018-07-23 | End: 2018-07-27 | Stop reason: HOSPADM

## 2018-07-23 RX ORDER — ESTRADIOL 1 MG/1
0.5 TABLET ORAL DAILY
Status: DISCONTINUED | OUTPATIENT
Start: 2018-07-23 | End: 2018-07-24

## 2018-07-23 RX ADMIN — VITAMIN D, TAB 1000IU (100/BT) 2000 UNITS: 25 TAB at 11:14

## 2018-07-23 RX ADMIN — GUAIFENESIN 600 MG: 600 TABLET, EXTENDED RELEASE ORAL at 16:57

## 2018-07-23 RX ADMIN — FUROSEMIDE 20 MG: 40 TABLET ORAL at 09:46

## 2018-07-23 RX ADMIN — ZOLPIDEM TARTRATE 5 MG: 5 TABLET ORAL at 21:42

## 2018-07-23 RX ADMIN — LEVOTHYROXINE SODIUM 112 MCG: 112 TABLET ORAL at 11:14

## 2018-07-23 RX ADMIN — TADALAFIL 40 MG: 20 TABLET ORAL at 17:17

## 2018-07-23 RX ADMIN — HEPARIN SODIUM 5000 UNITS: 5000 INJECTION, SOLUTION INTRAVENOUS; SUBCUTANEOUS at 09:49

## 2018-07-23 RX ADMIN — DOXYCYCLINE 100 MG: 100 INJECTION, POWDER, LYOPHILIZED, FOR SOLUTION INTRAVENOUS at 08:48

## 2018-07-23 RX ADMIN — ACETAMINOPHEN 650 MG: 325 TABLET ORAL at 21:46

## 2018-07-23 RX ADMIN — Medication 400 MG: at 11:14

## 2018-07-23 RX ADMIN — HEPARIN SODIUM 5000 UNITS: 5000 INJECTION, SOLUTION INTRAVENOUS; SUBCUTANEOUS at 16:56

## 2018-07-23 RX ADMIN — Medication 400 MG: at 16:58

## 2018-07-23 RX ADMIN — SPIRONOLACTONE 12.5 MG: 25 TABLET, FILM COATED ORAL at 11:14

## 2018-07-23 RX ADMIN — HEPARIN SODIUM 5000 UNITS: 5000 INJECTION, SOLUTION INTRAVENOUS; SUBCUTANEOUS at 23:39

## 2018-07-23 RX ADMIN — DOXYCYCLINE 100 MG: 100 INJECTION, POWDER, LYOPHILIZED, FOR SOLUTION INTRAVENOUS at 21:29

## 2018-07-23 RX ADMIN — DILTIAZEM HYDROCHLORIDE 30 MG: 30 TABLET, FILM COATED ORAL at 16:55

## 2018-07-23 RX ADMIN — IPRATROPIUM BROMIDE AND ALBUTEROL SULFATE 3 ML: .5; 3 SOLUTION RESPIRATORY (INHALATION) at 08:01

## 2018-07-23 RX ADMIN — GENTAMICIN SULFATE 1 DROP: 3 SOLUTION OPHTHALMIC at 11:14

## 2018-07-23 RX ADMIN — GENTAMICIN SULFATE 1 DROP: 3 SOLUTION OPHTHALMIC at 21:31

## 2018-07-23 RX ADMIN — ESTRADIOL 0.5 MG: 1 TABLET ORAL at 12:14

## 2018-07-23 RX ADMIN — Medication 10 ML: at 21:30

## 2018-07-23 RX ADMIN — GENTAMICIN SULFATE 1 DROP: 3 SOLUTION OPHTHALMIC at 16:55

## 2018-07-23 RX ADMIN — Medication 10 ML: at 11:15

## 2018-07-23 RX ADMIN — SODIUM CHLORIDE 500 ML: 900 INJECTION, SOLUTION INTRAVENOUS at 08:12

## 2018-07-23 RX ADMIN — Medication 10 ML: at 14:02

## 2018-07-23 NOTE — ED NOTES
TRANSFER - OUT REPORT:    Verbal report given to radha on nAdreia Rivera  being transferred to [facility] for routine progression of care       Report consisted of patients Situation, Background, Assessment and   Recommendations(SBAR). Information from the following report(s) SBAR, MAR and Recent Results was reviewed with the receiving nurse. Lines:       Opportunity for questions and clarification was provided.       Patient transported with:   O2 @ 3 liters

## 2018-07-23 NOTE — ED PROVIDER NOTES
[address] Emergency Department  Seen  @ [facility] EMERGENCY DEPT in room ERA/ A    Chief Complaint   Patient presents with    Shortness of Breath       HPI:   Adrianna Briscoe is a 66 y.o. female who complaints of worsening shortness of breath for the last several days. She sees Dr. Duane Bermeo. Has a history congestive heart failure. Is on oxygen at home. She is coughing up green mucus. At 4:00 this morning she felt much worse    No alleviating. No aggravating    No fever. No chest pain is reported. No leg swelling.      Historian: patient and her     Review of Systems:    CONST:  Denies: fever, chills, weakness,   ENT: Denies: sore throat, earache, nasal congestion,   EYES: Denies: vision changes, double vision, discharge,   CARDIO: Per HPI   RESP: Per HPI  GI: Denies: abdominal pain, nausea, vomiting, diarrhea, melena, hematochezia,   : Denies: dysuria, hematuria, urinary frequency,   MUSC: Denies: muscle aches, joint pain,   SKIN: Denies: hives, rash,   PSYCH: Denies: anxiety, depression,   ENDO: Denies: polyuria, polydipsia,   Heme/Lymph:      Denies: easy bruising, bleeding,   NEURO: Denies: headache, confusion, change in behavior, extremity,weakness, paresthesias,     Past Medical History:  Primary Care Doctor: Parris Arciniega MD    Past Medical History:   Diagnosis Date    Acute on chronic combined systolic and diastolic heart failure ([address] 6/11/2015    Acute renal failure (ARF) ([address] 6/13/2015    Adiposity 11/10/2015    Arthritis 4/9/2014    Bilateral carotid artery stenosis     CAD (coronary artery disease) 4/9/2014    Moderate CAD 4/15     Chronic kidney disease     pt states she has one kidney    Coagulation disorder ([address]     Congestive heart failure (CHF) ([address]     Dyspnea 6/11/2015    Dyspnea on exertion 4/9/2014    GERD (gastroesophageal reflux disease)     History of ITP     2012 in [facility] for treatments-last one Nov '12    Hyperlipidemia 4/9/2014    Hypertension 4/9/2014    Hyponatremia 6/11/2015    Hypothyroid     Hypothyroidism 6/11/2015    Hypoxemia 7/16/2014    Left atrial dilation     Liver disease     elevated enzymes-     Lung nodule 4/9/2014    Initial CT scan in [facility] 11/15/2005--10 mm nodule in right mid lung; PET negative 6/13/2011 CT  10/16/2011 @ [place], [place]--Multiple right lung nodules without adenopathy     MI (myocardial infarction) ([address]     2009- no intervention- mild/ EF per echo 9/5/13= 40%    Nausea and vomiting 6/13/2016    Other ill-defined conditions(799.89)     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    Pulmonary fibrosis ([address] 4/9/2014    Initial CT scan in [facility]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     Pulmonary hypertension ([address]     Pulmonary valve regurgitation     PVC (premature ventricular contraction) 4/25/2014    Raynaud's disease 4/9/2014    Right atrial dilation     Severe tricuspid regurgitation     Sicca syndrome, Sjogren's ([address] 4/9/2015    Thrombocytopenia ([address] 10/12/2015       Past Surgical History:   Procedure Laterality Date    HX APPENDECTOMY      HX COLONOSCOPY      2002 or 2004 polyps Elizabeth    HX HERNIA REPAIR      bilat    HX HYSTERECTOMY      HX ORTHOPAEDIC      spinal/transplant 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 Daughter lives in [place]. No pets. Retired, previously worked as an .        Previous Medications    ACETAMINOPHEN (TYLENOL) 500 MG TABLET Take 2 Tabs by mouth every six (6) hours as needed for Pain. B.INFANTIS-B. ANI-B. LONG-B.BIFI (PROBIOTIC 4X) 10-15 MG TBEC    Take  by mouth every morning. CHOLECALCIFEROL, VITAMIN D3, 2,000 UNIT TAB    Take 1 Tab by mouth every morning. ESTRADIOL (ESTRACE) 0.5 MG TABLET    Take 1 Tab by mouth daily. FUROSEMIDE (LASIX) 20 MG TABLET    Take 1 Tab by mouth daily. KETOPROFEN, MICRONIZED 10 % CMAP    by Apply Externally route. LEVOTHYROXINE (SYNTHROID) 112 MCG TABLET    Take 1 Tab by mouth Daily (before breakfast). Indications: hypothyroidism    MAGNESIUM OXIDE (MAG-OX) 400 MG TABLET    Take 1 Tab by mouth two (2) times a day. MILK THISTLE PO    Take  by mouth. OMEGA-3-DHA-EPA-FISH OIL (FISH OIL) 1,000 MG (120 MG-180 MG) CAP    Take  by mouth. ONDANSETRON (ZOFRAN ODT) 4 MG DISINTEGRATING TABLET    Take 1 Tab by mouth every six (6) hours as needed for Nausea. OTHER    Ketoprofen 10%. Apply 3 to 4 times daily  Compounded cream RX # [id number]    OXYGEN-AIR DELIVERY SYSTEMS    2 l/m continuous  Indications: 2 l/m continuous    SPIRONOLACTONE (ALDACTONE) 25 MG TABLET    Take 1 Tab by mouth every morning. TADALAFIL (ADCIRCA) 20 MG TABLET    Take 20 mg by mouth two (2) times a day. For lungs    THERAPEUTIC MULTIVITAMIN (THERAGRAN) TABLET    Take 1 Tab by mouth daily. VIT A,C & E-LUTEIN-MINERALS (HEALTHY EYES) TABLET    Take 2 Tabs by mouth daily. thera tears    ZALEPLON (SONATA) 10 MG CAPSULE    Take 1 Cap by mouth nightly as needed. Max Daily Amount: 10 mg. Allergies   Allergen Reactions    Other Food Anaphylaxis     shellfish    Esomeprazole Magnesium Rash    Iodinated Contrast- Oral And Iv Dye Swelling and Anaphylaxis     IVP Dye  Had tongue and throat swelling 25 years ago (1989). Pt has had steroids and benadryl with CT scan dye since and did fine.     Iodine And Iodide Containing Products Anaphylaxis    Shellfish Derived Anaphylaxis    Amitriptyline Hives    Amlodipine Hives  Amoxicillin Other (comments) and Hives    Benicar [Olmesartan] Hives    Bystolic [Nebivolol] Hives    Flecainide Rash    Hydrochlorothiazide Hives    Ibuprofen Hives    Iodine Unknown (comments)    Keflex [Cephalexin] Rash and Hives    Levofloxacin (Bulk) Hives    Lisinopril Hives    Lisinopril-Hydrochlorothiazide Rash    Nexium [Esomeprazole Magnesium] Hives    Nitrofurantoin Hives    Nitrofurantoin Macrocrystal Rash    Nitrofurantoin Macrocrystalline Other (comments)    Sulfa (Sulfonamide Antibiotics) Hives       Physical Exam:    Vitals:    07/23/18 0611 07/23/18 0629 07/23/18 0659 07/23/18 0801   BP: 129/74 127/66 100/53    Pulse: (!) 112 100 98    Resp: (!) 48 15 11    Temp:       SpO2: 92% 98% 95% 95%     Vital signs were reviewed.   Pulse oximetry interpretation: normal on oxygen    Constitutional: well appearing, nontoxic,   Head: Atraumatic, normo-cephalic,   Ears/Nose/Throat: throat clear, mucous membranes moist,   Eyes: PERRL, EOMI, anicteric,   Neck: supple, FROM, no lymphadenopathy, no meningismus,   Cardiovascular: regular rate and rhythm, no murmur, 2+ radial pulses,    Respiratory: Scattered wheezing  Back:  FROM, no CVA tenderness,   Abdomen: soft, non-tender, no guarding/rebound, no percussion tenderness,    Musculoskeletal: no deformities, edema,   Skin: dry, no rashes,   Neurologic: alert, oriented, answers questions follows commands,   Psychiatric: Speech pattern and content normal,      _____________________________________________________________________  Medical Decision Making:    Differential Diagnosis for this patient includes: pneumonia, chf, anemia      This is a new problem that does need additional workup  Labs/Radiographs/ECG were ordered: yes  Old records were reviewed: yes  CT/US/XRay/MRI were visualized by me: yes    The patient's problem is: acute complicated  The Diagnostic Options are: minimal risk  The Management Options are: low risk    ______________________________________________________________________  ED Evaluation    Labs:   Recent Results (from the past 8 hour(s))   CBC WITH AUTOMATED DIFF    Collection Time: 07/23/18  6:06 AM   Result Value Ref Range    WBC 14.7 (H) 4.3 - 11.1 K/uL    RBC 4.51 4.05 - 5.25 M/uL    HGB 14.2 11.7 - 15.4 g/dL    HCT 42.9 35.8 - 46.3 %    MCV 95.1 79.6 - 97.8 FL    MCH 31.5 26.1 - 32.9 PG    MCHC 33.1 31.4 - 35.0 g/dL    RDW 13.9 11.9 - 14.6 %    PLATELET 958 455 - 884 K/uL    MPV 9.4 (L) 10.8 - 14.1 FL    DF AUTOMATED      NEUTROPHILS 59 43 - 78 %    LYMPHOCYTES 24 13 - 44 %    MONOCYTES 15 (H) 4.0 - 12.0 %    EOSINOPHILS 1 0.5 - 7.8 %    BASOPHILS 0 0.0 - 2.0 %    IMMATURE GRANULOCYTES 1 0.0 - 5.0 %    ABS. NEUTROPHILS 8.7 (H) 1.7 - 8.2 K/UL    ABS. LYMPHOCYTES 3.6 0.5 - 4.6 K/UL    ABS. MONOCYTES 2.2 (H) 0.1 - 1.3 K/UL    ABS. EOSINOPHILS 0.2 0.0 - 0.8 K/UL    ABS. BASOPHILS 0.0 0.0 - 0.2 K/UL    ABS. IMM. GRANS. 0.1 0.0 - 0.5 K/UL   METABOLIC PANEL, COMPREHENSIVE    Collection Time: 07/23/18  6:06 AM   Result Value Ref Range    Sodium 134 (L) 136 - 145 mmol/L    Potassium 3.7 3.5 - 5.1 mmol/L    Chloride 97 (L) 98 - 107 mmol/L    CO2 27 21 - 32 mmol/L    Anion gap 10 7 - 16 mmol/L    Glucose 126 (H) 65 - 100 mg/dL    BUN 13 8 - 23 MG/DL    Creatinine 1.16 (H) 0.6 - 1.0 MG/DL    GFR est AA 58 (L) >60 ml/min/1.73m2    GFR est non-AA 48 (L) >60 ml/min/1.73m2    Calcium 9.6 8.3 - 10.4 MG/DL    Bilirubin, total 1.0 0.2 - 1.1 MG/DL    ALT (SGPT) 20 12 - 65 U/L    AST (SGOT) 26 15 - 37 U/L    Alk.  phosphatase 94 50 - 136 U/L    Protein, total 8.1 6.3 - 8.2 g/dL    Albumin 3.5 3.2 - 4.6 g/dL    Globulin 4.6 (H) 2.3 - 3.5 g/dL    A-G Ratio 0.8 (L) 1.2 - 3.5     BNP    Collection Time: 07/23/18  6:06 AM   Result Value Ref Range     pg/mL   POC TROPONIN-I    Collection Time: 07/23/18  6:10 AM   Result Value Ref Range    Troponin-I (POC) 0.79 (H) 0.02 - 0.05 ng/ml       Labs were reviewed and interpreted by me.    Radiology studies performed:   XR CHEST PORT   Final Result   IMPRESSION: Small focus of left lower lobe atelectasis or pneumonia. Radiographs were visualized by me    12 Lead EKG Interpretation - performed by me 6:57 AM  Rate: 104 beats per minute  Rhythm: sinus tach  Axis:  right  ST segments: depressed  Other findings: none  Interpretation: abnormal EKG    Current Facility-Administered Medications   Medication Dose Route Frequency    doxycycline (VIBRAMYCIN) 100 mg in 0.9% sodium chloride (MBP/ADV) 100 mL  100 mg IntraVENous Q12H     Current Outpatient Prescriptions   Medication Sig    acetaminophen (TYLENOL) 500 mg tablet Take 2 Tabs by mouth every six (6) hours as needed for Pain.  levothyroxine (SYNTHROID) 112 mcg tablet Take 1 Tab by mouth Daily (before breakfast). Indications: hypothyroidism    furosemide (LASIX) 20 mg tablet Take 1 Tab by mouth daily. (Patient taking differently: Take 20 mg by mouth daily. Take an extra pill PRN for 1-2 pound weight gain)    spironolactone (ALDACTONE) 25 mg tablet Take 1 Tab by mouth every morning. (Patient taking differently: Take 12.5 mg by mouth every morning.)    magnesium oxide (MAG-OX) 400 mg tablet Take 1 Tab by mouth two (2) times a day.  ketoprofen, micronized 10 % cmap by Apply Externally route.  zaleplon (SONATA) 10 mg capsule Take 1 Cap by mouth nightly as needed. Max Daily Amount: 10 mg.    cholecalciferol, vitamin D3, 2,000 unit tab Take 1 Tab by mouth every morning.  OTHER Ketoprofen 10%. Apply 3 to 4 times daily  Compounded cream RX # [id number]    ondansetron (ZOFRAN ODT) 4 mg disintegrating tablet Take 1 Tab by mouth every six (6) hours as needed for Nausea.  vit a,c & e-lutein-minerals (HEALTHY EYES) tablet Take 2 Tabs by mouth daily. thera tears    estradiol (ESTRACE) 0.5 mg tablet Take 1 Tab by mouth daily.  Omega-3-DHA-EPA-Fish Oil (FISH OIL) 1,000 mg (120 mg-180 mg) cap Take  by mouth.     MILK THISTLE PO Take  by mouth.  therapeutic multivitamin (THERAGRAN) tablet Take 1 Tab by mouth daily.  tadalafil (ADCIRCA) 20 mg tablet Take 20 mg by mouth two (2) times a day. For lungs    OXYGEN-AIR DELIVERY SYSTEMS 2 l/m continuous  Indications: 2 l/m continuous    B.infantis-B.ani-B.long-B.bifi (PROBIOTIC 4X) 10-15 mg TbEC Take  by mouth every morning.       ==================================================================  ASSESSMENT: 51-year-old female with worsening shortness of breath. Chest x-ray shows small LLL infiltrate. WBC elevated. Troponin elevated. BMP is unremarkable    PLAN: blood cultures, repeat lactate, antibiotics. Consult hospitalist for admission.   Consult cardiology  _____________________________________________________________________    Condition:  fair  Disposition:  admit  Diagnosis:  Pneumonia, dyspnea, cough, elevated troponin    Brandyn Rouse MD; 7/23/2018 @6:57 AM===========================================    ED Course

## 2018-07-23 NOTE — IP AVS SNAPSHOT
303 [facility] 
 
 
 2329 [facility] 322 [facility] 
[phone] Patient: Ama Crawford MRN: [id number] YWU:[id number] About your hospitalization You were admitted on:  July 23, 2018 You last received care in the:  [facility] 8 MED SURG You were discharged on:  July 27, 2018 Why you were hospitalized Your primary diagnosis was:  Left Lower Lobe Pneumonia (Hcc) Your diagnoses also included:  Acute Conjunctivitis, Hyperlipidemia, Cad (Coronary Artery Disease), Hypertension, Acquired Hypothyroidism, Pulmonary Hypertension (Hcc), Cardiomyopathy, Dilated (Hcc), Elevated Troponin Follow-up Information Follow up With Details Comments Contact Info Rashawn Lee MD On 8/2/2018 10:00am 2 [place] [address] [place] [address] [address] Internal Medicine and Diagnostic Group [facility] [address] 
[phone] Whitley Larios DO On 8/31/2018 4:30pm 2 [place] [facility] [address] [facility] Cardiology [facility] [id number] [phone] 0 [facility]  will follow up after discharge. [phone] 8260 [facility] RN and PT to see after discharge. [address] [facility] [address] [place] [address] 
[phone] Your Scheduled Appointments Thursday August 02, 2018 10:00 AM EDT Office Visit with Rashawn Lee MD  
Internal Medicine and Diagnostics ([address] 2 [facility] 
[address] 187 [facility] [address]  
[phone] Tuesday August 07, 2018 10:50 AM EDT CYSTOSCOPY with Michael Jessica DO [facility] ([facility] UROLOGY) [address] [address]  
[phone] Wednesday August 15, 2018  9:00 AM EDT  
SC PT MASSAGE GENERAL with Shane Rowland MANUEL [facility], [facility] ([facility] [address] 
[address]  
[phone] Friday August 31, 2018  4:30 PM EDT HOSPITAL FOLLOW-UP with Ramsey Soulier, 7122 [facility] OFFICE ([address] [place] [address]) 2 Markell Sanchez 400 [address]  
[phone] Discharge Orders None A check vee indicates which time of day the medication should be taken. My Medications START taking these medications Instructions Each Dose to Equal  
 Morning Noon Evening Bedtime  
 cefpodoxime 200 mg tablet Commonly known as:  Arnold Kumar Your next dose is: This evening Take 1 Tab by mouth every twelve (12) hours for 4 days. 200 mg  
    
  
   
   
  
   
  
 dilTIAZem  mg ER capsule Commonly known as:  CARDIZEM CD Start taking on:  7/28/2018 Your next dose is:  Tomorrow Morning Take 1 Cap by mouth daily. 120 mg  
    
  
   
   
   
  
 doxycycline 100 mg capsule Commonly known as:  VIBRAMYCIN Your next dose is: This evening Take 1 Cap by mouth every twelve (12) hours. 100 mg  
    
  
   
   
  
   
  
 fluticasone 50 mcg/actuation nasal spray Commonly known as:  Radha Reges Your next dose is:  Tomorrow Morning 2 Sprays by Both Nostrils route daily for 5 days. 2 Spray  
    
  
   
   
   
  
 gentamicin 0.3 % ophthalmic solution Commonly known as:  GARAMYCIN Your last dose was:  7/27/18 at 9:13 a.m. Your next dose is:  TODAY following schedule every 4 hours Administer 1 Drop to right eye four (4) times daily for 7 days. 1 Drop  
    
  
   
  
   
  
   
  
  
 guaiFENesin 1,200 mg Ta12 ER tablet Commonly known as:  Tyler & Tyler Your next dose is: This evening Take 1 Tab by mouth two (2) times a day for 5 days. 1200 mg  
    
  
   
   
  
   
  
 guaiFENesin-dextromethorphan -30 mg per tablet Commonly known as:  HUMIBID DM Your next dose is: Take on as needed schedule Take 1 Tab by mouth every twelve (12) hours as needed for Cough or Congestion for up to 10 days. 1 Tab  
    
   
   
   
  
 raNITIdine 150 mg tablet Commonly known as:  ZANTAC Your next dose is: This evening Take 1 Tab by mouth two (2) times a day for 14 days. 150 mg CHANGE how you take these medications Instructions Each Dose to Equal  
 Morning Noon Evening Bedtime  
 furosemide 20 mg tablet Commonly known as:  LASIX What changed:  additional instructions Your next dose is:  Tomorrow Morning Take 1 Tab by mouth daily. 20 mg  
    
  
   
   
   
  
 spironolactone 25 mg tablet Commonly known as:  ALDACTONE What changed:  how much to take Your next dose is:  Tomorrow Morning Take 1 Tab by mouth every morning. 25 mg CONTINUE taking these medications Instructions Each Dose to Equal  
 Morning Noon Evening Bedtime  
 acetaminophen 500 mg tablet Commonly known as:  TYLENOL Your next dose is: Take on as needed schedule Take 2 Tabs by mouth every six (6) hours as needed for Pain. 1000 mg ADCIRCA 20 mg tablet Generic drug:  tadalafil Your next dose is:  Tomorrow Morning Take 40 mg by mouth daily. For lungs 40 mg  
    
  
   
   
   
  
 cholecalciferol (vitamin D3) 2,000 unit Tab Your next dose is:  Tomorrow Morning Take 1 Tab by mouth every morning. 2000 Units  
    
  
   
   
   
  
 estradiol 0.5 mg tablet Commonly known as:  ESTRACE Your next dose is:  Tomorrow Morning Take 1 Tab by mouth daily. 0.5 mg  
    
  
   
   
   
  
 FISH OIL 1,000 mg (120 mg-180 mg) capsule Generic drug:  omega 3-DHA-EPA-fish oil Your next dose is:  Resume home schedule Take  by mouth. HEALTHY EYES tablet Generic drug:  vit a,c & e-lutein-minerals Your next dose is:  Tomorrow Morning Take 2 Tabs by mouth daily. thera tears 2 Tab  
    
  
   
   
   
  
 ketoprofen, micronized 10 % Cmap  
   
 by Apply Externally route. levothyroxine 112 mcg tablet Commonly known as:  SYNTHROID Your next dose is:  Tomorrow Morning Take 1 Tab by mouth Daily (before breakfast). Indications: hypothyroidism 112 mcg  
    
  
   
   
   
  
 magnesium oxide 400 mg tablet Commonly known as:  MAG-OX Your next dose is: This evening Take 1 Tab by mouth two (2) times a day. 400 mg MILK THISTLE PO Your next dose is:  Resume home schedule Take  by mouth. ondansetron 4 mg disintegrating tablet Commonly known as:  ZOFRAN ODT Your next dose is: Take on as needed schedule Take 1 Tab by mouth every six (6) hours as needed for Nausea. 4 mg OTHER Ketoprofen 10%. Apply 3 to 4 times daily  Compounded cream RX # [id number] OXYGEN-AIR DELIVERY SYSTEMS  
   
 2 l/m continuous  Indications: 2 l/m continuous PROBIOTIC 4X 10-15 mg Tbec Generic drug:  B.infantis-B.ani-B.long-B.bifi Your next dose is:  Tomorrow Morning Take  by mouth every morning. therapeutic multivitamin tablet Commonly known as:  [facility] Your next dose is:  Tomorrow Morning Take 1 Tab by mouth daily. 1 Tab  
    
  
   
   
   
  
 zaleplon 10 mg capsule Commonly known as:  SONATA Your next dose is: Take tonight IF needed Take 1 Cap by mouth nightly as needed. Max Daily Amount: 10 mg.  
 10 mg Where to Get Your Medications Information on where to get these meds will be given to you by the nurse or doctor. ! Ask your nurse or doctor about these medications  
  cefpodoxime 200 mg tablet dilTIAZem  mg ER capsule  
 doxycycline 100 mg capsule  
 fluticasone 50 mcg/actuation nasal spray  
 gentamicin 0.3 % ophthalmic solution  
 guaiFENesin 1,200 mg Ta12 ER tablet  
 guaiFENesin-dextromethorphan -30 mg per tablet  
 raNITIdine 150 mg tablet Discharge Instructions Finish full course of antibiotics for pneumonia Finish full course of antibiotics for right eye infection. If not improving please follow-up with  ophthalmology Follow-up with PCP in 3-5 days and recheck CBC at that time Follow-up with cardiology in 3-4 weeks IF any fevers, increased cough or any worrisome symptoms please call PCP, 911 your pulmonologist or go to nearest ED Pneumonia: Care Instructions Your Care Instructions Pneumonia is an infection of the lungs. Most cases are caused by infections from bacteria or viruses. Pneumonia may be mild or very severe. If it is caused by bacteria, you will be treated with antibiotics. It may take a few weeks to a few months to recover fully from pneumonia, depending on how sick you were and whether your overall health is good. Follow-up care is a key part of your treatment and safety. Be sure to make and go to all appointments, and call your doctor if you are having problems. It's also a good idea to know your test results and keep a list of the medicines you take. How can you care for yourself at home? · Take your antibiotics exactly as directed. Do not stop taking the medicine just because you are feeling better. You need to take the full course of antibiotics. · Take your medicines exactly as prescribed. Call your doctor if you think you are having a problem with your medicine. · Get plenty of rest and sleep. You may feel weak and tired for a while, but your energy level will improve with time. · To prevent dehydration, drink plenty of fluids, enough so that your urine is light yellow or clear like water.  Choose water and other caffeine-free clear liquids until you feel better. If you have kidney, heart, or liver disease and have to limit fluids, talk with your doctor before you increase the amount of fluids you drink. · Take care of your cough so you can rest. A cough that brings up mucus from your lungs is common with pneumonia. It is one way your body gets rid of the infection. But if coughing keeps you from resting or causes severe fatigue and chest-wall pain, talk to your doctor. He or she may suggest that you take a medicine to reduce the cough. · Use a vaporizer or humidifier to add moisture to your bedroom. Follow the directions for cleaning the machine. · Do not smoke or allow others to smoke around you. Smoke will make your cough last longer. If you need help quitting, talk to your doctor about stop-smoking programs and medicines. These can increase your chances of quitting for good. · Take an over-the-counter pain medicine, such as acetaminophen (Tylenol), ibuprofen (Advil, Motrin), or naproxen (Aleve). Read and follow all instructions on the label. · Do not take two or more pain medicines at the same time unless the doctor told you to. Many pain medicines have acetaminophen, which is Tylenol. Too much acetaminophen (Tylenol) can be harmful. · If you were given a spirometer to measure how well your lungs are working, use it as instructed. This can help your doctor tell how your recovery is going. · To prevent pneumonia in the future, talk to your doctor about getting a flu vaccine (once a year) and a pneumococcal vaccine (one time only for most people). When should you call for help? Call 911 anytime you think you may need emergency care. For example, call if: 
  · You have severe trouble breathing.  
 Call your doctor now or seek immediate medical care if: 
  · You cough up dark brown or bloody mucus (sputum).  
  · You have new or worse trouble breathing.   · You are dizzy or lightheaded, or you feel like you may faint.  
 Watch closely for changes in your health, and be sure to contact your doctor if: 
  · You have a new or higher fever.  
  · You are coughing more deeply or more often.  
  · You are not getting better after 2 days (48 hours).  
  · You do not get better as expected. Where can you learn more? Go to http://tirso-randell.info/. Enter [id number] in the search box to learn more about \"Pneumonia: Care Instructions. \" Current as of: December 6, 2017 Content Version: 11.7 © 7576-5679 Last.fm. Care instructions adapted under license by [employer] (which disclaims liability or warranty for this information). If you have questions about a medical condition or this instruction, always ask your healthcare professional. [place] [address] any warranty or liability for your use of this information. DISCHARGE SUMMARY from Nurse PATIENT INSTRUCTIONS: 
 
 
F-face looks uneven A-arms unable to move or move unevenly S-speech slurred or non-existent T-time-call 911 as soon as signs and symptoms begin-DO NOT go Back to bed or wait to see if you get better-TIME IS BRAIN. Warning Signs of HEART ATTACK Call 911 if you have these symptoms: 
? Chest discomfort. Most heart attacks involve discomfort in the center of the chest that lasts more than a few minutes, or that goes away and comes back. It can feel like uncomfortable pressure, squeezing, fullness, or pain. ? Discomfort in other areas of the upper body. Symptoms can include pain or discomfort in one or both arms, the back, neck, jaw, or stomach. ? Shortness of breath with or without chest discomfort. ? Other signs may include breaking out in a cold sweat, nausea, or lightheadedness. Don't wait more than five minutes to call [address]! Fast action can save your life. Calling 911 is almost always the fastest way to get lifesaving treatment. Emergency Medical Services staff can begin treatment when they arrive  up to an hour sooner than if someone gets to the hospital by car. The discharge information has been reviewed with the patient. The patient verbalized understanding. Discharge medications reviewed with the patient and appropriate educational materials and side effects teaching were provided. ___________________________________________________________________________________________________________________________________ ACO Transitions of Care [facility] [address] [place] [address] offers a voluntary care coordination program to provide high quality service and care to [facility] fee-for-service beneficiaries. Tish Brennan was designed to help you enhance your health and well-being through the following services: ? Transitions of Care  support for individuals who are transitioning from one care setting to another (example: Hospital to home). ? Chronic and Complex Care Coordination  support for individuals and caregivers of those with serious or chronic illnesses or with more than one chronic (ongoing) condition and those who take a number of different medications. If you meet specific medical criteria, a [facility] [address] may call you directly to coordinate your care with your primary care physician and your other care providers. For questions about the [facility] programs, please, contact your physicians office. For general questions or additional information about Accountable Care Organizations: 
Please visit www.medicare.gov/acos. html or call 1-800-MEDICARE (1-122.291.1148) TTY users should call 2-421.185.6341. MyChart Announcement We are excited to announce that we are making your provider's discharge notes available to you in Trekea. You will see these notes when they are completed and signed by the physician that discharged you from your recent hospital stay. If you have any questions or concerns about any information you see in Trekea, please call the Health Information Department where you were seen or reach out to your Primary Care Provider for more information about your plan of care. Introducing [facility]! Dear Miguel A Luis: Thank you for requesting a Trekea account. Our records indicate that you already have an active Trekea account. You can access your account anytime at [email or web address] Did you know that you can access your hospital and ER discharge instructions at any time in Trekea? You can also review all of your test results from your hospital stay or ER visit. Additional Information If you have questions, please visit the Frequently Asked Questions section of the Trekea website at [email or web address] Remember, Trekea is NOT to be used for urgent needs. For medical emergencies, dial 911. Now available from your iPhone and Android! Introducing Jesus Sweet As a [employer] patient, I wanted to make you aware of our electronic visit tool called Jesus Yovanimiguel. [employer] 24/7 allows you to connect within minutes with a medical provider 24 hours a day, seven days a week via a mobile device or tablet or logging into a secure website from your computer. You can access Jesus Sweet from anywhere in the [place].  
 
A virtual visit might be right for you when you have a simple condition and feel like you just dont want to get out of bed, or cant get away from work for an appointment, when your regular [employer] provider is not available (evenings, weekends or holidays), or when youre out of town and need minor care. Electronic visits cost only $49 and if the Easy Eye 24/iConText provider determines a prescription is needed to treat your condition, one can be electronically transmitted to a nearby pharmacy*. Please take a moment to enroll today if you have not already done so. The enrollment process is free and takes just a few minutes. To enroll, please download the ProTip/iConText kim to your tablet or phone, or visit [email or web address] to enroll on your computer. And, as an [address] [place] [address] patient with a [employer] account, the results of your visits will be scanned into your electronic medical record and your primary care provider will be able to view the scanned results. We urge you to continue to see your regular [facility] provider for your ongoing medical care. And while your primary care provider may not be the one available when you seek a Presentain virtual visit, the peace of mind you get from getting a real diagnosis real time can be priceless. For more information on Presentain, view our Frequently Asked Questions (FAQs) at [email or web address]. Sincerely, 
 
Fatimah Raya MD 
Chief Medical Officer [address] *:  certain medications cannot be prescribed via Presentain Unresulted Labs-Please follow up with your PCP about these lab tests Order Current Status CULTURE, BLOOD Preliminary result CULTURE, BLOOD Preliminary result Providers Seen During Your Hospitalization Provider Specialty Primary office phone Juarez Gamez MD Emergency Medicine [phone] Darya Yip MD Internal Medicine [phone] Your Primary Care Physician (PCP) Primary Care Physician Office Phone Office Fax 70 [facility] [phone] You are allergic to the following Allergen Reactions Other Food Anaphylaxis  
 shellfish Esomeprazole Magnesium Rash Iodinated Contrast- Oral And Iv Dye Swelling Anaphylaxis IVP Dye Had tongue and throat swelling 25 years ago (1989). Pt has had steroids and benadryl with CT scan dye since and did fine. Iodine And Iodide Containing Products Anaphylaxis Shellfish Derived Anaphylaxis Amitriptyline Hives Amlodipine Hives Amoxicillin Other (comments) Hives Benicar (Olmesartan) Hives Bystolic (Nebivolol) Hives Flecainide Rash Hydrochlorothiazide Hives Ibuprofen Hives Iodine Unknown (comments) Keflex (Cephalexin) Rash Hives Levofloxacin (Bulk) Hives Lisinopril Hives Lisinopril-Hydrochlorothiazide Rash Nexium (Esomeprazole Magnesium) Hives Nitrofurantoin Hives Nitrofurantoin Macrocrystal Rash Nitrofurantoin Macrocrystalline Other (comments) Sulfa (Sulfonamide Antibiotics) Hives Recent Documentation Height Weight Breastfeeding? BMI OB Status Smoking Status 1.549 m 75.8 kg No 31.59 kg/m2 Hysterectomy Never Smoker Emergency Contacts Name Discharge Info Relation Home Work Mobile Miguel Tristan Elvira DISCHARGE CAREGIVER [3] Daughter [21] [phone] Archen Flores  Boyfriend [17]   [phone] Patient Belongings The following personal items are in your possession at time of discharge: 
  Dental Appliances: None  Visual Aid: None      Home Medications: None   Jewelry: None  Clothing: With patient    Other Valuables: None Please provide this summary of care documentation to your next provider. Signatures-by signing, you are acknowledging that this After Visit Summary has been reviewed with you and you have received a copy. Patient Signature:  ____________________________________________________________  Date:  ____________________________________________________________  
  
[facility]    
 Provider Signature:  ____________________________________________________________ Date:  ____________________________________________________________

## 2018-07-23 NOTE — PROGRESS NOTES
TRANSFER - IN REPORT:    Verbal report received from [facility], [facility] (name) on Regine Fields  being received from ED (unit) for routine progression of care      Report consisted of patients Situation, Background, Assessment and   Recommendations(SBAR). Information from the following report(s) SBAR and Kardex was reviewed with the receiving nurse. Opportunity for questions and clarification was provided. Assessment completed upon patients arrival to unit and care assumed. SBAR given to primary receiving RN, Jake Solomon.

## 2018-07-23 NOTE — ED NOTES
Chief Complaint   Patient presents with     COMPREHENSIVE EYE EXAM     more contacts fit fee ok        Previous contact lens wearer? Yes: dailies multi od,ravinder os  Comfort of contact lenses :sometimes has to try a few times to get lens to feel good when puts in  Satisfied with current lenses: No,od eye not clear at all        Last Eye Exam: 5-  Dilated Previously: Yes    What are you currently using to see?  glasses and contacts    Distance Vision Acuity: Noticed gradual change in right eye    Near Vision Acuity: Satisfied with vision while reading  with glasses    Eye Comfort: itchy  Do you use eye drops? : Yes: pataday  Occupation or Hobbies:  dept      Sabi Ramirez Optometric Assistant, A.B.O.C.     Medical, surgical and family histories reviewed and updated 6/6/2017.       OBJECTIVE: See Ophthalmology exam    ASSESSMENT:    ICD-10-CM    1. Myopia, bilateral H52.13 CONTACT LENS FITTING,BILAT     REFRACTION   2. Presbyopia H52.4 CONTACT LENS FITTING,BILAT     REFRACTION   3. Hyperphoria H50.53 EYE EXAM (SIMPLE-NONBILLABLE)   4. Allergic conjunctivitis, bilateral H10.13 EYE EXAM (SIMPLE-NONBILLABLE)     olopatadine HCl (PATADAY) 0.2 % SOLN   5. Nuclear sclerosis, bilateral H25.13 EYE EXAM (SIMPLE-NONBILLABLE)      PLAN:     Patient Instructions   Eyeglass prescription given.    Trial contact lenses will be ordered for .   Ok to order.  Send a message on Aligo if vision not comfortable and we will try a different brand of multifocal right eye.    Prescription for Pataday to be used once daily for itchy eyes.  Use as needed.    You have the start of mild cataracts.  You may notice some blurred vision or glare with night driving.  It is important that you wear good sunglasses to protect your eyes from the ultraviolet light from the sun.  I recommend that you return in 1 year for an eye exam unless there are any sudden changes in your vision.    Return in 1 year for a complete eye  Report received from DAVID Cruz care assumed at this time. exam or sooner if needed.    Joshua Rothman, OD

## 2018-07-23 NOTE — PROGRESS NOTES
Pt has DNR paperwork, copy made and placed in chart.  Dr. Alexandru Cooper notified, code status changed to DNR

## 2018-07-23 NOTE — PROGRESS NOTES
Dr. Angel Valentin notified of elevated critical troponin 0.85. No verbal orders received. Pt asymptomatic in room. VSS. Call bell in reach.

## 2018-07-23 NOTE — PROGRESS NOTES
Patient being admitted to [address] [place] [address] from ER  Provided a supportive presence as she begins her admission  Thankful    David Wagner, staff Manuela velez 02, [address] [facility]  /   [email or web address]

## 2018-07-23 NOTE — IP AVS SNAPSHOT
[address] 
[phone] Patient: Adrianna Briscoe MRN: [id number] TK:[id number] A check vee indicates which time of day the medication should be taken. My Medications START taking these medications Instructions Each Dose to Equal  
 Morning Noon Evening Bedtime  
 cefpodoxime 200 mg tablet Commonly known as:  Juanna Fillers Your next dose is: This evening Take 1 Tab by mouth every twelve (12) hours for 4 days. 200 mg  
    
  
   
   
  
   
  
 dilTIAZem  mg ER capsule Commonly known as:  CARDIZEM CD Start taking on:  7/28/2018 Your next dose is:  Tomorrow Morning Take 1 Cap by mouth daily. 120 mg  
    
  
   
   
   
  
 doxycycline 100 mg capsule Commonly known as:  VIBRAMYCIN Your next dose is: This evening Take 1 Cap by mouth every twelve (12) hours. 100 mg  
    
  
   
   
  
   
  
 fluticasone 50 mcg/actuation nasal spray Commonly known as:  [place] Your next dose is:  Tomorrow Morning 2 Sprays by Both Nostrils route daily for 5 days. 2 Spray  
    
  
   
   
   
  
 gentamicin 0.3 % ophthalmic solution Commonly known as:  GARAMYCIN Your last dose was:  7/27/18 at 9:13 a.m. Your next dose is:  TODAY following schedule every 4 hours Administer 1 Drop to right eye four (4) times daily for 7 days. 1 Drop  
    
  
   
  
   
  
   
  
  
 guaiFENesin 1,200 mg Ta12 ER tablet Commonly known as:  [address] Your next dose is: This evening Take 1 Tab by mouth two (2) times a day for 5 days. 1200 mg  
    
  
   
   
  
   
  
 guaiFENesin-dextromethorphan -30 mg per tablet Commonly known as:  HUMIBID DM Your next dose is: Take on as needed schedule Take 1 Tab by mouth every twelve (12) hours as needed for Cough or Congestion for up to 10 days. 1 Tab raNITIdine 150 mg tablet Commonly known as:  ZANTAC Your next dose is: This evening Take 1 Tab by mouth two (2) times a day for 14 days. 150 mg CHANGE how you take these medications Instructions Each Dose to Equal  
 Morning Noon Evening Bedtime  
 furosemide 20 mg tablet Commonly known as:  LASIX What changed:  additional instructions Your next dose is:  Tomorrow Morning Take 1 Tab by mouth daily. 20 mg  
    
  
   
   
   
  
 spironolactone 25 mg tablet Commonly known as:  ALDACTONE What changed:  how much to take Your next dose is:  Tomorrow Morning Take 1 Tab by mouth every morning. 25 mg CONTINUE taking these medications Instructions Each Dose to Equal  
 Morning Noon Evening Bedtime  
 acetaminophen 500 mg tablet Commonly known as:  TYLENOL Your next dose is: Take on as needed schedule Take 2 Tabs by mouth every six (6) hours as needed for Pain. 1000 mg ADCIRCA 20 mg tablet Generic drug:  tadalafil Your next dose is:  Tomorrow Morning Take 40 mg by mouth daily. For lungs 40 mg  
    
  
   
   
   
  
 cholecalciferol (vitamin D3) 2,000 unit Tab Your next dose is:  Tomorrow Morning Take 1 Tab by mouth every morning. 2000 Units  
    
  
   
   
   
  
 estradiol 0.5 mg tablet Commonly known as:  ESTRACE Your next dose is:  Tomorrow Morning Take 1 Tab by mouth daily. 0.5 mg  
    
  
   
   
   
  
 FISH OIL 1,000 mg (120 mg-180 mg) capsule Generic drug:  omega 3-DHA-EPA-fish oil Your next dose is:  Resume home schedule Take  by mouth. HEALTHY EYES tablet Generic drug:  vit a,c & e-lutein-minerals Your next dose is:  Tomorrow Morning Take 2 Tabs by mouth daily. thera tears 2 Tab  
    
  
   
   
   
  
 ketoprofen, micronized 10 % Cmap by Apply Externally route. levothyroxine 112 mcg tablet Commonly known as:  SYNTHROID Your next dose is:  Tomorrow Morning Take 1 Tab by mouth Daily (before breakfast). Indications: hypothyroidism 112 mcg  
    
  
   
   
   
  
 magnesium oxide 400 mg tablet Commonly known as:  MAG-OX Your next dose is: This evening Take 1 Tab by mouth two (2) times a day. 400 mg MILK THISTLE PO Your next dose is:  Resume home schedule Take  by mouth. ondansetron 4 mg disintegrating tablet Commonly known as:  ZOFRAN ODT Your next dose is: Take on as needed schedule Take 1 Tab by mouth every six (6) hours as needed for Nausea. 4 mg OTHER Ketoprofen 10%. Apply 3 to 4 times daily  Compounded cream RX # [id number] OXYGEN-AIR DELIVERY SYSTEMS  
   
 2 l/m continuous  Indications: 2 l/m continuous PROBIOTIC 4X 10-15 mg Tbec Generic drug:  B.infantis-B.ani-B.long-B.bifi Your next dose is:  Tomorrow Morning Take  by mouth every morning. therapeutic multivitamin tablet Commonly known as:  [facility] Your next dose is:  Tomorrow Morning Take 1 Tab by mouth daily. 1 Tab  
    
  
   
   
   
  
 zaleplon 10 mg capsule Commonly known as:  SONATA Your next dose is: Take tonight IF needed Take 1 Cap by mouth nightly as needed. Max Daily Amount: 10 mg.  
 10 mg Where to Get Your Medications Information on where to get these meds will be given to you by the nurse or doctor. ! Ask your nurse or doctor about these medications  
  cefpodoxime 200 mg tablet  
 dilTIAZem  mg ER capsule  
 doxycycline 100 mg capsule  
 fluticasone 50 mcg/actuation nasal spray  
 gentamicin 0.3 % ophthalmic solution guaiFENesin 1,200 mg Ta12 ER tablet  
 guaiFENesin-dextromethorphan -30 mg per tablet  
 raNITIdine 150 mg tablet

## 2018-07-23 NOTE — CONSULTS
[facility] Cardiology Consult                Date of  Admission: 7/23/2018  6:05 AM     Primary Care Physician:  Dr. Bruna Ernst  Primary Cardiologist:  Dr. Sandra Jones  Referring Physician:  Dr. Narcisa Morrow Physician:  Dr. Khushbu Peace    CC/Reason for consult:  Elevated troponin leonel Ledezma is a 66 y.o. female with PMH of CAD ([address] [facility] 3/2018 showed mild nonobstructive CAD with normal EF), heavy PCV burden with ablation in 2014, pulmonary fibrosis,  pulmonary HTN, GERD, and Raynauds, who presented to the ED with complaints of shortness of breath. Patient reports a cough started on Thursday and symptoms progressed over the weekend. She notes a productive cough with green sputum and a low grade temp at home. Around 5 am this morning she felt significantly worse and checked her oxygen level and HR, which were 90% and 122, which prompted the visit to the ED. Work up in the ED revealed leukocytosis with WBCs of 14.7, , elevated trop of 0.79. CXR showed LLL pneumonia. EKG showed ST with RBBB without acute ST elevation. On exam the patient is alert and oriented without complaints of chest pain, dizziness, palpitations, or syncope. HR is in 90s and SpO2 is 99% on 2.5L. Patient Active Problem List   Diagnosis Code    Pulmonary fibrosis ([facility] J84.10    Dyspnea on exertion R06.09    Lung nodule R91.1    Arthritis M19.90    Hyperlipidemia E78.5    CAD (coronary artery disease) I25.10    Raynaud's disease I73.00    Hypertension I10    PVC (premature ventricular contraction) I49.3    Hypoxemia R09.02    Sicca syndrome, Sjogren's (HCC) M35.00    Dyspnea R06.00    Hyponatremia E87.1    Pulmonary hypertension (HCC) I27.20    Gastroesophageal reflux disease K21.9    Obesity (BMI 30.0-34. 9) E66.9    Postnasal drip R09.82    Shortness of breath R06.02    Undifferentiated connective tissue disease (HCC) M35.9    Postmenopausal Z78.0    Acquired hypothyroidism E03.9    Nausea and vomiting R11.2    Insomnia G47.00    Cough R05    Cardiomyopathy, dilated (HCC) I42.0    Hypovitaminosis D E55.9    Collagenous colitis K52.831    Primary osteoarthritis involving multiple joints M15.0    Shoulder blade pain M89.8X1    Community acquired bacterial pneumonia J15.9    Acute conjunctivitis H10.30    Pneumonia J18.9       Past Medical History:   Diagnosis Date    Acute on chronic combined systolic and diastolic heart failure (HCC) 6/11/2015    Acute renal failure (ARF) ([address] 6/13/2015    Adiposity 11/10/2015    Arthritis 4/9/2014    Bilateral carotid artery stenosis     CAD (coronary artery disease) 4/9/2014    Moderate CAD 4/15     Chronic kidney disease     pt states she has one kidney    Coagulation disorder ([address]     Congestive heart failure (CHF) ([address]     Dyspnea 6/11/2015    Dyspnea on exertion 4/9/2014    GERD (gastroesophageal reflux disease)     History of ITP     2012 in [facility] treatments-last one Nov '12    Hyperlipidemia 4/9/2014    Hypertension 4/9/2014    Hyponatremia 6/11/2015    Hypothyroid     Hypothyroidism 6/11/2015    Hypoxemia 7/16/2014    Left atrial dilation     Liver disease     elevated enzymes-     Lung nodule 4/9/2014    Initial CT scan in [facility] 11/15/2005--10 mm nodule in right mid lung; PET negative 6/13/2011 CT  10/16/2011 @ [facility], [place], [place]--Multiple right lung nodules without adenopathy     MI (myocardial infarction) ([address]     2009- no intervention- mild/ EF per echo 9/5/13= 40%    Nausea and vomiting 6/13/2016    Other ill-defined conditions(799.89)     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    Pulmonary fibrosis ([address] 4/9/2014    Initial CT scan in [facility]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     Pulmonary hypertension ([address]     Pulmonary valve regurgitation     PVC (premature ventricular contraction) 4/25/2014    Raynaud's disease 4/9/2014    Right atrial dilation     Severe tricuspid regurgitation     Sicca syndrome, Sjogren's ([facility] 4/9/2015    Thrombocytopenia ([facility] [address] 10/12/2015      Past Surgical History:   Procedure Laterality Date    HX APPENDECTOMY      HX COLONOSCOPY      2002 or 2004 polyps Elizabeth    HX HERNIA REPAIR      bilat    HX HYSTERECTOMY      HX ORTHOPAEDIC      spinal/transplant 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    HX TONSIL AND ADENOIDECTOMY      AL COLONOSCOPY W/BIOPSY SINGLE/MULTIPLE  12/13/2013          Allergies   Allergen Reactions    Other Food Anaphylaxis     shellfish    Esomeprazole Magnesium Rash    Iodinated Contrast- Oral And Iv Dye Swelling and Anaphylaxis     IVP Dye  Had tongue and throat swelling 25 years ago (1989). Pt has had steroids and benadryl with CT scan dye since and did fine.     Iodine And Iodide Containing Products Anaphylaxis    Shellfish Derived Anaphylaxis    Amitriptyline Hives    Amlodipine Hives    Amoxicillin Other (comments) and Hives    Benicar [Olmesartan] Hives    Bystolic [Nebivolol] Hives    Flecainide Rash    Hydrochlorothiazide Hives    Ibuprofen Hives    Iodine Unknown (comments)    Keflex [Cephalexin] Rash and Hives    Levofloxacin (Bulk) Hives    Lisinopril Hives    Lisinopril-Hydrochlorothiazide Rash    Nexium [Esomeprazole Magnesium] Hives    Nitrofurantoin Hives    Nitrofurantoin Macrocrystal Rash    Nitrofurantoin Macrocrystalline Other (comments)    Sulfa (Sulfonamide Antibiotics) Hives      Family History   Problem Relation Age of Onset    Cancer Mother      ovarian    Hypertension Father     Heart Disease Father      MI age 72/ valve replaced    Colon Cancer Neg Hx         Current Facility-Administered Medications   Medication Dose Route Frequency    doxycycline (VIBRAMYCIN) 100 mg in 0.9% sodium chloride (MBP/ADV) 100 mL  100 mg IntraVENous Q12H    sodium chloride (NS) flush 5-10 mL  5-10 mL IntraVENous Q8H    sodium chloride (NS) flush 5-10 mL  5-10 mL IntraVENous PRN    acetaminophen (TYLENOL) tablet 650 mg  650 mg Oral Q4H PRN    naloxone (NARCAN) injection 0.4 mg  0.4 mg IntraVENous PRN    HYDROcodone-acetaminophen (NORCO) 5-325 mg per tablet 1 Tab  1 Tab Oral Q4H PRN    ondansetron (ZOFRAN) injection 4 mg  4 mg IntraVENous Q4H PRN    diphenhydrAMINE (BENADRYL) injection 12.5 mg  12.5 mg IntraVENous Q4H PRN    senna-docusate (PERICOLACE) 8.6-50 mg per tablet 1 Tab  1 Tab Oral DAILY    zolpidem (AMBIEN) tablet 5 mg  5 mg Oral QHS PRN    heparin (porcine) injection 5,000 Units  5,000 Units SubCUTAneous Q8H    levothyroxine (SYNTHROID) tablet 112 mcg  112 mcg Oral ACB    furosemide (LASIX) tablet 20 mg  20 mg Oral DAILY    magnesium oxide (MAG-OX) tablet 400 mg  400 mg Oral BID    . PHARMACY TO SUBSTITUTE PER PROTOCOL (Reordered from: cholecalciferol, vitamin D3, 2,000 unit tab)    Per Protocol    spironolactone (ALDACTONE) tablet 12.5 mg  12.5 mg Oral 7am    estradiol (ESTRACE) tablet 0.5 mg  0.5 mg Oral DAILY    . PHARMACY TO SUBSTITUTE PER PROTOCOL (Reordered from: tadalafil (ADCIRCA) 20 mg tablet)    Per Protocol    gentamicin (GARAMYCIN) 0.3 % ophthalmic solution 1 Drop  1 Drop Right Eye QID     Current Outpatient Prescriptions   Medication Sig    acetaminophen (TYLENOL) 500 mg tablet Take 2 Tabs by mouth every six (6) hours as needed for Pain.  levothyroxine (SYNTHROID) 112 mcg tablet Take 1 Tab by mouth Daily (before breakfast). Indications: hypothyroidism    furosemide (LASIX) 20 mg tablet Take 1 Tab by mouth daily. (Patient taking differently: Take 20 mg by mouth daily. Take an extra pill PRN for 1-2 pound weight gain)    spironolactone (ALDACTONE) 25 mg tablet Take 1 Tab by mouth every morning. (Patient taking differently: Take 12.5 mg by mouth every morning.)    magnesium oxide (MAG-OX) 400 mg tablet Take 1 Tab by mouth two (2) times a day.  ketoprofen, micronized 10 % cmap by Apply Externally route.  zaleplon (SONATA) 10 mg capsule Take 1 Cap by mouth nightly as needed.  Max Daily Amount: 10 mg.    cholecalciferol, vitamin D3, 2,000 unit tab Take 1 Tab by mouth every morning.  OTHER Ketoprofen 10%. Apply 3 to 4 times daily  Compounded cream RX # [id number]    ondansetron (ZOFRAN ODT) 4 mg disintegrating tablet Take 1 Tab by mouth every six (6) hours as needed for Nausea.  vit a,c & e-lutein-minerals (HEALTHY EYES) tablet Take 2 Tabs by mouth daily. thera tears    estradiol (ESTRACE) 0.5 mg tablet Take 1 Tab by mouth daily.  Omega-3-DHA-EPA-Fish Oil (FISH OIL) 1,000 mg (120 mg-180 mg) cap Take  by mouth.  MILK THISTLE PO Take  by mouth.  therapeutic multivitamin (THERAGRAN) tablet Take 1 Tab by mouth daily.  tadalafil (ADCIRCA) 20 mg tablet Take 20 mg by mouth two (2) times a day. For lungs    OXYGEN-AIR DELIVERY SYSTEMS 2 l/m continuous  Indications: 2 l/m continuous    B.infantis-B.ani-B.long-B.bifi (PROBIOTIC 4X) 10-15 mg TbEC Take  by mouth every morning. Review of Systems   Constitutional: Positive for fever and malaise/fatigue. HENT: Negative. Eyes: Negative. Respiratory: Positive for cough, sputum production and shortness of breath. Cardiovascular: Negative. Gastrointestinal: Negative. Genitourinary: Negative. Musculoskeletal: Negative. Skin: Negative. Neurological: Negative. Endo/Heme/Allergies: Negative. Psychiatric/Behavioral: Negative. Physical Exam  Vitals:    07/23/18 0611 07/23/18 0629 07/23/18 0659 07/23/18 0801   BP: 129/74 127/66 100/53    Pulse: (!) 112 100 98    Resp: (!) 48 15 11    Temp:       SpO2: 92% 98% 95% 95%   Weight:       Height:           Physical Exam:  Physical Exam   Constitutional: She is oriented to person, place, and time. She has a sickly appearance. HENT:   Head: Normocephalic. Eyes: Pupils are equal, round, and reactive to light. Neck: Normal range of motion. Cardiovascular: Normal rate and regular rhythm. Pulmonary/Chest: Effort normal. She has rales. Abdominal: Soft.  Bowel sounds are normal.   Musculoskeletal: Normal range of motion. Neurological: She is alert and oriented to person, place, and time. Skin: Skin is warm and dry. Psychiatric: Mood, memory, affect and judgment normal.       Cardiographics    Telemetry: normal sinus rhythm  ECG: sinus tachycardia, RBBB  Echocardiogram: pending    Labs:   Recent Labs      07/23/18   0606   NA  134*   K  3.7   BUN  13   CREA  1.16*   GLU  126*   WBC  14.7*   HGB  14.2   HCT  42.9   PLT  284        Assessment/Plan:     Assessment:       Community acquired bacterial pneumonia -- receiving IV abx. Will be admitted by hospitalist team for further treatment. Elevated troponin -- supply demand mismatch in the setting of tachycardia and hypoxia. Will not treat as ACS at this time. Will trend troponin levels and repeat echo. CAD (coronary artery disease)-- add low dose ASA. No BB or ACE due to allergy. Overview: Moderate CAD 4/15      Hypertension -- stable      Pulmonary hypertension       Overview: Pulmonary Hypertension: had heart cath 4/16/15. At that time her mPAP was only 22, though her systolic PAP was elevated in       the 50's and her PVR was elevated at 6. The low mPAP at that time is not       conistent with the current TTE findings of RVSP of 110. She has an autoimmune process that according to her has not been labelled       as a specific entity by her rheumatologists in the past.           Acquired hypothyroidism-- continue synthroid      Cardiomyopathy, dilated-- continue lasix and aldactone      Acute conjunctivitis -- per primary      Pneumonia-- see above       Thank you very much for this referral. We appreciate the opportunity to participate in this patient's care. We will follow along with above stated plan.     Ronna Spencer NP  Consulting MD: Kelly Quiroga

## 2018-07-23 NOTE — PROGRESS NOTES
Pt resting in bed. 92% on RA. Respirations even, unlabored. NSR on remote tele, hr 80. Call bell in reach.

## 2018-07-23 NOTE — H&P
History and Physical 
 
Patient: Lisa Nye MRN: [id number]  SSN: [id number] YOB: 1940  Age: 66 y.o. Sex: female Subjective:  
  
Lisa Nye is a 66 y.o. female who came to ER due to cough and shortness of breath. Patient has history of dilated cardiomyopathy, chronic congestive heart failure, pulmonary hypertension, CAD (however reportedly last cardiac cath 3 months ago was \"normal\"), hypertension, hyperlipidemia, hypothyroidism, and other medical problems as mentioned below. She has been coughing for the past 4 days. Also, producing phlegm, thick sputum. She denies hemoptysis. No fever. No chest pain. Some shortness of breath especially with cough. She notices that her right eye is red with some discharge this morning. Her vision is fine. She denies abdominal pain, no blood per rectum or urine. She denies changes in her medications recently. No shaking. No chills. She has not been taking antibiotics recently. No immediate sick contacts. Past Medical History:  
Diagnosis Date  Acute on chronic combined systolic and diastolic heart failure ([address] 6/11/2015  Acute renal failure (ARF) ([address] 6/13/2015  Adiposity 11/10/2015  Arthritis 4/9/2014  Bilateral carotid artery stenosis  CAD (coronary artery disease) 4/9/2014 Moderate CAD 4/15  Chronic kidney disease   
 pt states she has one kidney  Coagulation disorder ([address]  Congestive heart failure (CHF) ([address]  Dyspnea 6/11/2015  Dyspnea on exertion 4/9/2014  GERD (gastroesophageal reflux disease)  History of ITP   
 2012 in [facility] for treatments-last one Nov '12  Hyperlipidemia 4/9/2014  Hypertension 4/9/2014  Hyponatremia 6/11/2015  Hypothyroid  Hypothyroidism 6/11/2015  Hypoxemia 7/16/2014  Left atrial dilation  Liver disease   
 elevated enzymes-   
 Lung nodule 4/9/2014  Initial CT scan in [facility] 11/15/2005--10 mm nodule in right mid lung; PET negative 6/13/2011 CT  10/16/2011 @ [place], [place]--Multiple right lung nodules without adenopathy  MI (myocardial infarction) ([address] 2009- no intervention- mild/ EF per echo 9/5/13= 40%  Nausea and vomiting 6/13/2016  Other ill-defined conditions(799.89)   
 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  
Procedure Laterality Date  HX APPENDECTOMY  HX COLONOSCOPY    
 2002 or 2004 polyps [facility]  HX HERNIA REPAIR    
 bilat  HX HYSTERECTOMY  HX ORTHOPAEDIC    
 spinal/transplant 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  HX TONSIL AND ADENOIDECTOMY  [place] COLONOSCOPY W/BIOPSY SINGLE/MULTIPLE  12/13/2013 Family History Problem Relation Age of Onset  Cancer Mother   
  ovarian  Hypertension Father  Heart Disease Father MI age 73/ valve replaced  Colon Cancer Neg Hx Social History Substance Use Topics  Smoking status: Never Smoker  Smokeless tobacco: Never Used  Alcohol use Yes Comment: occas Prior to Admission medications Medication Sig Start Date End Date Taking? Authorizing Provider  
acetaminophen (TYLENOL) 500 mg tablet Take 2 Tabs by mouth every six (6) hours as needed for Pain. 7/7/18   Violet Pan MD  
levothyroxine (SYNTHROID) 112 mcg tablet Take 1 Tab by mouth Daily (before breakfast). Indications: hypothyroidism 6/28/18   Violet Pan MD  
furosemide (LASIX) 20 mg tablet Take 1 Tab by mouth daily. Patient taking differently: Take 20 mg by mouth daily.  Take an extra pill PRN for 1-2 pound weight gain 6/20/18   Rasheed Quiros, DO  
spironolactone (ALDACTONE) 25 mg tablet Take 1 Tab by mouth every morning. Patient taking differently: Take 12.5 mg by mouth every morning. 6/20/18   Rasheed Quiros, DO  
magnesium oxide (MAG-OX) 400 mg tablet Take 1 Tab by mouth two (2) times a day. 4/19/18   Maxine Almonte MD  
ketoprofen, micronized 10 % cmap by Apply Externally route. Historical Provider  
zaleplon (SONATA) 10 mg capsule Take 1 Cap by mouth nightly as needed. Max Daily Amount: 10 mg. 1/9/18   Maxine Almonte MD  
cholecalciferol, vitamin D3, 2,000 unit tab Take 1 Tab by mouth every morning. 1/9/18   Maxine Almonte MD  
OTHER Ketoprofen 10%. Apply 3 to 4 times daily  Compounded cream RX # [id number] 1/9/18   Maxine Almonte MD  
ondansetron (ZOFRAN ODT) 4 mg disintegrating tablet Take 1 Tab by mouth every six (6) hours as needed for Nausea. 10/11/17   Jeff Sims MD  
vit a,c & e-lutein-minerals (HEALTHY EYES) tablet Take 2 Tabs by mouth daily. thera tears    Historical Provider  
estradiol (ESTRACE) 0.5 mg tablet Take 1 Tab by mouth daily. 6/26/17   Maxine Almonte MD  
Rctxz-8-QVT-EPA-Fish Oil (FISH OIL) 1,000 mg (120 mg-180 mg) cap Take  by mouth. Historical Provider MILK THISTLE PO Take  by mouth. Historical Provider  
therapeutic multivitamin (THERAGRAN) tablet Take 1 Tab by mouth daily. Historical Provider  
tadalafil (ADCIRCA) 20 mg tablet Take 20 mg by mouth two (2) times a day. For lungs    Historical Provider OXYGEN-AIR DELIVERY SYSTEMS 2 l/m continuous  Indications: 2 l/m continuous    Historical Provider B.infantis-B.ani-B.long-B.bifi (PROBIOTIC 4X) 10-15 mg TbEC Take  by mouth every morning. Historical Provider Allergies Allergen Reactions  Other Food Anaphylaxis  
  shellfish  Esomeprazole Magnesium Rash  Iodinated Contrast- Oral And Iv Dye Swelling and Anaphylaxis IVP Dye Had tongue and throat swelling 25 years ago (1989). Pt has had steroids and benadryl with CT scan dye since and did fine.  Iodine And Iodide Containing Products Anaphylaxis  Shellfish Derived Anaphylaxis  Amitriptyline Hives  Amlodipine Hives  Amoxicillin Other (comments) and Hives  Benicar [Olmesartan] Hives  Bystolic [Nebivolol] Hives  Flecainide Rash  Hydrochlorothiazide Hives  Ibuprofen Hives  Iodine Unknown (comments)  Keflex [Cephalexin] Rash and Hives  Levofloxacin (Bulk) Hives  Lisinopril Hives  Lisinopril-Hydrochlorothiazide Rash  Nexium [Esomeprazole Magnesium] Hives  Nitrofurantoin Hives  Nitrofurantoin Macrocrystal Rash  Nitrofurantoin Macrocrystalline Other (comments)  Sulfa (Sulfonamide Antibiotics) Hives Review of Systems: 
 
Constitutional: Negative for chills and fever. HENT: Negative for rhinorrhea and sore throat. Eyes: Negative for pain, + redness at right eye and no visual disturbance. Respiratory: Negative for chest tightness, some shortness of breath and no wheezing. Cardiovascular: Negative for chest pain and leg swelling. Gastrointestinal: Negative for abdominal pain, bowel incontinence, diarrhea, nausea and vomiting. Genitourinary: Negative for bladder incontinence, dysuria and hematuria. Musculoskeletal: Negative for back pain, gait problem, neck pain and neck stiffness. Skin: Negative for color change and rash. Neurological: negative for speech difficulty. Negative for focal weakness, weakness, numbness, headaches and loss of balance. Psychiatric/Behavioral: Negative for agitation, confusion and memory loss. Objective:  
 
Vitals:  
 07/23/18 1591 07/23/18 2185 07/23/18 9178 07/23/18 0801 BP: 129/74 127/66 100/53 Pulse: (!) 112 100 98 Resp: (!) 48 15 11 Temp:      
SpO2: 92% 98% 95% 95% Weight:      
Height:      
  
 
Physical Exam: 
 
General:                    The patient is a pleasant female in no acute distress. lying flat in bed. Head:                                   Normocephalic/atraumatic. Eyes:                                   No palpebral pallor or scleral icterus. Right eye with some redness of conjunctiva. Some mildly yellow discharge. No corneal abrasion. ENT:                                    External auricular and nasal exam within normal limits. Mucous membranes are moist. 
Neck:                                   Supple, non-tender, no JVD. Lungs:                       Clear to auscultation bilaterally without wheezes or crackles. No respiratory distress or accessory muscle use. Heart:                                  Regular rate and rhythm, without murmurs, rubs, or gallops. Healed midline surgical scar. Abdomen:                  Soft, non-tender, mildly distended with normoactive bowel sounds. Genitourinary:           No tenderness over the bladder or bilateral CVAs. Extremities:               Without clubbing, cyanosis, or edema. Skin:                                    Normal color, texture, and turgor. No rashes, lesions, or jaundice. Pulses:                      Radial and dorsalis pedis pulses present 2+ bilaterally. Capillary refill <2s. Neurologic:                CN II-XII grossly intact and symmetrical.  
                                            Moving all four extremities well with no focal deficits. Psychiatric:                Pleasant demeanor, appropriate affect. Alert and oriented x 3 Lab and data Recent Results (from the past 24 hour(s)) CBC WITH AUTOMATED DIFF Collection Time: 07/23/18  6:06 AM  
Result Value Ref Range WBC 14.7 (H) 4.3 - 11.1 K/uL  
 RBC 4.51 4.05 - 5.25 M/uL  
 HGB 14.2 11.7 - 15.4 g/dL HCT 42.9 35.8 - 46.3 %  MCV 95.1 79.6 - 97.8 FL  
 MCH 31.5 26.1 - 32.9 PG  
 MCHC 33.1 31.4 - 35.0 g/dL  
 RDW 13.9 11.9 - 14.6 % PLATELET 886 439 - 177 K/uL MPV 9.4 (L) 10.8 - 14.1 FL  
 DF AUTOMATED NEUTROPHILS 59 43 - 78 % LYMPHOCYTES 24 13 - 44 % MONOCYTES 15 (H) 4.0 - 12.0 % EOSINOPHILS 1 0.5 - 7.8 % BASOPHILS 0 0.0 - 2.0 % IMMATURE GRANULOCYTES 1 0.0 - 5.0 %  
 ABS. NEUTROPHILS 8.7 (H) 1.7 - 8.2 K/UL  
 ABS. LYMPHOCYTES 3.6 0.5 - 4.6 K/UL  
 ABS. MONOCYTES 2.2 (H) 0.1 - 1.3 K/UL  
 ABS. EOSINOPHILS 0.2 0.0 - 0.8 K/UL  
 ABS. BASOPHILS 0.0 0.0 - 0.2 K/UL  
 ABS. IMM. GRANS. 0.1 0.0 - 0.5 K/UL METABOLIC PANEL, COMPREHENSIVE Collection Time: 07/23/18  6:06 AM  
Result Value Ref Range Sodium 134 (L) 136 - 145 mmol/L Potassium 3.7 3.5 - 5.1 mmol/L Chloride 97 (L) 98 - 107 mmol/L  
 CO2 27 21 - 32 mmol/L Anion gap 10 7 - 16 mmol/L Glucose 126 (H) 65 - 100 mg/dL BUN 13 8 - 23 MG/DL Creatinine 1.16 (H) 0.6 - 1.0 MG/DL  
 GFR est AA 58 (L) >60 ml/min/1.73m2 GFR est non-AA 48 (L) >60 ml/min/1.73m2 Calcium 9.6 8.3 - 10.4 MG/DL Bilirubin, total 1.0 0.2 - 1.1 MG/DL  
 ALT (SGPT) 20 12 - 65 U/L  
 AST (SGOT) 26 15 - 37 U/L Alk. phosphatase 94 50 - 136 U/L Protein, total 8.1 6.3 - 8.2 g/dL Albumin 3.5 3.2 - 4.6 g/dL Globulin 4.6 (H) 2.3 - 3.5 g/dL A-G Ratio 0.8 (L) 1.2 - 3.5 BNP Collection Time: 07/23/18  6:06 AM  
Result Value Ref Range  pg/mL POC TROPONIN-I Collection Time: 07/23/18  6:10 AM  
Result Value Ref Range Troponin-I (POC) 0.79 (H) 0.02 - 0.05 ng/ml EKG, 12 LEAD, INITIAL Collection Time: 07/23/18  6:13 AM  
Result Value Ref Range Ventricular Rate 104 BPM  
 Atrial Rate 104 BPM  
 P-R Interval 178 ms QRS Duration 128 ms Q-T Interval 362 ms QTC Calculation (Bezet) 476 ms Calculated P Axis 56 degrees Calculated R Axis 43 degrees Calculated T Axis 17 degrees Diagnosis    
  !! AGE AND GENDER SPECIFIC ECG ANALYSIS !! Sinus tachycardia Right bundle branch block Abnormal ECG When compared with ECG of 09-MAR-2018 07:06, 
T wave inversion more evident in Anterior leads CXR IMPRESSION: Small focus of left lower lobe atelectasis or pneumonia. 
  
 
Assessment:  
 
Hospital Problems  Date Reviewed: 7/18/2018 Codes Class Noted POA * (Principal)Community acquired bacterial pneumonia ICD-10-CM: J15.9 ICD-9-CM: 482.9  7/23/2018 Unknown Acute conjunctivitis ICD-10-CM: H10.30 ICD-9-CM: 372.00  7/23/2018 Unknown Pneumonia ICD-10-CM: J18.9 ICD-9-CM: 789  7/23/2018 Unknown Cardiomyopathy, dilated (HCC) (Chronic) ICD-10-CM: I42.0 ICD-9-CM: 425.4  11/14/2017 Yes Acquired hypothyroidism (Chronic) ICD-10-CM: E03.9 ICD-9-CM: 244.9  11/10/2015 Yes Pulmonary hypertension (HCC) ICD-10-CM: I27.20 ICD-9-CM: 416.8  6/15/2015 Yes Overview Addendum 11/10/2015  8:52 AM by Lorraine Pablo MD  
  Pulmonary Hypertension: had heart cath 4/16/15. At that time her mPAP was only 22, though her systolic PAP was elevated in the 50's and her PVR was elevated at 6. The low mPAP at that time is not conistent with the current TTE findings of RVSP of 110. She has an autoimmune process that according to her has not been labelled as a specific entity by her rheumatologists in the past.  
RHC with vaso testing: BASELINE MEASUREMENTS: 
Pulmonary artery 70/30, mean of 40. Pulmonary artery saturation 70%. Cardiac output 3.2 L/min. Cardiac index 1.8 L/min per meter squared. Adenosine 10 mcg/kg per minute: Pulmonary artery 65/30, mean 40. Cardiac 
output 3.1 L/min. Cardiac index 1.8 L/min. Pulmonary artery saturation 
74%. Adenosine 30 mcg/kg per minute: Pulmonary artery 50/28, mean 38. Cardiac output 3.4 L/min. Cardiac index 2.0 L/min per meter squared. Pulmonary artery saturation 76%. Adenosine 50 mcg/kg per minute: Pulmonary artery 48/22, mean pulmonary 
artery 37. Cardiac output 3.8 L/min. Cardiac index 2.15 L/min per meter squared.  
Pulmonary artery 77%. 
Pulmonary capillary wedge pressure is 5-6 mmHg. Mean transpulmonary gradient at baseline 35, without significant change 
at peak adenosine. Non resposive PAHTN ( MPAP should decrease by at least 10 and to below 40 mmHg) CaCb likely not beneficial. 
  
  
   
 Hyperlipidemia (Chronic) ICD-10-CM: Y45.2 ICD-9-CM: 272.4  4/9/2014 Yes CAD (coronary artery disease) (Chronic) ICD-10-CM: I25.10 ICD-9-CM: 414.00  4/9/2014 Yes Overview Addendum 6/11/2015  4:42 PM by ANUPAMA Louie Moderate CAD 4/15 Hypertension (Chronic) ICD-10-CM: I10 
ICD-9-CM: 401.9  4/9/2014 Yes Plan:  
 
Community-acquired pneumonia. Admit to medical floor with remote telemetry because of troponin and baseline cardiac problems as above. IV access. Empiric antibiotic. She is allergic to several medications. She is getting Tobramycin now. Assess breathing and oxygen. Elevated troponin. Could be her baseline due to CHF and cardiomyopathy. No chest pain, no acute St-T changes. Monitor symptoms and check serial ECG. Hypertension Monitor blood pressure and manage accordingly. Continue home medications. Hyperlipidemia Hypothyroidism. [email or web address] Continue home medications. Acute conjunctivitis of the right eye Gentamycin eye drop. Advised patient to avoid self-contamination. Monitor. I have discussed with patient regarding advance directive. Patient would like to have a full-code status. Patient requires hospital stay as an in-patient and anticipated stay is more than 2 midnights due to the serious nature of the illness. DVT prophylaxis : heparin SC I have discussed the plan of care with patient and fiance at bedside. Signed By: Darya Yip MD   
 July 23, 2018

## 2018-07-23 NOTE — ED TRIAGE NOTES
PT arrived to ED c/o SOB for the past several days. PT has a Hx of CHF. PT states she has been cough up green mucus.  PT states at 4am her symptoms became worse then normal.

## 2018-07-23 NOTE — PROGRESS NOTES
Pt arrived to room 829. Alert to person, place, time. No acute distress on 3L nc. Posterior crackles. Productive cough, green/yellow sputum per patient. NSR on remote tele, hr 80. Abdomen soft. Last BM 7/22. Dual skin assessment completed with Suellyn Collet, RN. No open wounds noted. Small quarter sized bruise to L knee. Skin intact. Visitor at bedside. Door open and call bell in reach.

## 2018-07-24 PROBLEM — I42.0 CARDIOMYOPATHY, DILATED (HCC): Chronic | Status: RESOLVED | Noted: 2017-11-14 | Resolved: 2018-07-24

## 2018-07-24 PROBLEM — J18.9 LEFT LOWER LOBE PNEUMONIA: Status: ACTIVE | Noted: 2018-07-23

## 2018-07-24 LAB
ANION GAP SERPL CALC-SCNC: 8 MMOL/L (ref 7–16)
APPEARANCE UR: CLEAR
BASOPHILS # BLD: 0 K/UL (ref 0–0.2)
BASOPHILS NFR BLD: 0 % (ref 0–2)
BILIRUB UR QL: NEGATIVE
BUN SERPL-MCNC: 11 MG/DL (ref 8–23)
CALCIUM SERPL-MCNC: 8.8 MG/DL (ref 8.3–10.4)
CHLORIDE SERPL-SCNC: 100 MMOL/L (ref 98–107)
CO2 SERPL-SCNC: 27 MMOL/L (ref 21–32)
COLOR UR: YELLOW
CREAT SERPL-MCNC: 0.77 MG/DL (ref 0.6–1)
CRP SERPL-MCNC: 13.6 MG/DL (ref 0–0.9)
DIFFERENTIAL METHOD BLD: ABNORMAL
EOSINOPHIL # BLD: 0.2 K/UL (ref 0–0.8)
EOSINOPHIL NFR BLD: 3 % (ref 0.5–7.8)
ERYTHROCYTE [DISTWIDTH] IN BLOOD BY AUTOMATED COUNT: 13.9 % (ref 11.9–14.6)
GLUCOSE SERPL-MCNC: 91 MG/DL (ref 65–100)
GLUCOSE UR STRIP.AUTO-MCNC: NEGATIVE MG/DL
HCT VFR BLD AUTO: 36 % (ref 35.8–46.3)
HGB BLD-MCNC: 11.9 G/DL (ref 11.7–15.4)
HGB UR QL STRIP: NEGATIVE
IMM GRANULOCYTES # BLD: 0.1 K/UL (ref 0–0.5)
IMM GRANULOCYTES NFR BLD AUTO: 1 % (ref 0–5)
KETONES UR QL STRIP.AUTO: NEGATIVE MG/DL
LEUKOCYTE ESTERASE UR QL STRIP.AUTO: NEGATIVE
LYMPHOCYTES # BLD: 1.7 K/UL (ref 0.5–4.6)
LYMPHOCYTES NFR BLD: 21 % (ref 13–44)
MCH RBC QN AUTO: 31.1 PG (ref 26.1–32.9)
MCHC RBC AUTO-ENTMCNC: 33.1 G/DL (ref 31.4–35)
MCV RBC AUTO: 94 FL (ref 79.6–97.8)
MONOCYTES # BLD: 1.1 K/UL (ref 0.1–1.3)
MONOCYTES NFR BLD: 14 % (ref 4–12)
NEUTS SEG # BLD: 5.2 K/UL (ref 1.7–8.2)
NEUTS SEG NFR BLD: 61 % (ref 43–78)
NITRITE UR QL STRIP.AUTO: NEGATIVE
PH UR STRIP: 7 [PH] (ref 5–9)
PLATELET # BLD AUTO: 220 K/UL (ref 150–450)
PMV BLD AUTO: 9.4 FL (ref 10.8–14.1)
POTASSIUM SERPL-SCNC: 3.9 MMOL/L (ref 3.5–5.1)
PROT UR STRIP-MCNC: NEGATIVE MG/DL
RBC # BLD AUTO: 3.83 M/UL (ref 4.05–5.25)
SODIUM SERPL-SCNC: 135 MMOL/L (ref 136–145)
SP GR UR REFRACTOMETRY: 1.01 (ref 1–1.02)
TROPONIN I SERPL-MCNC: 0.28 NG/ML (ref 0.02–0.05)
UROBILINOGEN UR QL STRIP.AUTO: 0.2 EU/DL (ref 0.2–1)
WBC # BLD AUTO: 8.3 K/UL (ref 4.3–11.1)

## 2018-07-24 PROCEDURE — 74011250636 HC RX REV CODE- 250/636: Performed by: INTERNAL MEDICINE

## 2018-07-24 PROCEDURE — 80048 BASIC METABOLIC PNL TOTAL CA: CPT | Performed by: INTERNAL MEDICINE

## 2018-07-24 PROCEDURE — 74011250637 HC RX REV CODE- 250/637: Performed by: EMERGENCY MEDICINE

## 2018-07-24 PROCEDURE — 84484 ASSAY OF TROPONIN QUANT: CPT | Performed by: HOSPITALIST

## 2018-07-24 PROCEDURE — 74011000258 HC RX REV CODE- 258: Performed by: EMERGENCY MEDICINE

## 2018-07-24 PROCEDURE — 65660000000 HC RM CCU STEPDOWN

## 2018-07-24 PROCEDURE — 74011250637 HC RX REV CODE- 250/637: Performed by: INTERNAL MEDICINE

## 2018-07-24 PROCEDURE — 74011250637 HC RX REV CODE- 250/637: Performed by: HOSPITALIST

## 2018-07-24 PROCEDURE — 86140 C-REACTIVE PROTEIN: CPT | Performed by: HOSPITALIST

## 2018-07-24 PROCEDURE — 85025 COMPLETE CBC W/AUTO DIFF WBC: CPT | Performed by: INTERNAL MEDICINE

## 2018-07-24 PROCEDURE — 74011000250 HC RX REV CODE- 250: Performed by: HOSPITALIST

## 2018-07-24 PROCEDURE — 74011000250 HC RX REV CODE- 250: Performed by: EMERGENCY MEDICINE

## 2018-07-24 PROCEDURE — C8929 TTE W OR WO FOL WCON,DOPPLER: HCPCS

## 2018-07-24 PROCEDURE — 36415 COLL VENOUS BLD VENIPUNCTURE: CPT | Performed by: INTERNAL MEDICINE

## 2018-07-24 PROCEDURE — 74011000258 HC RX REV CODE- 258: Performed by: HOSPITALIST

## 2018-07-24 PROCEDURE — 74011000250 HC RX REV CODE- 250: Performed by: INTERNAL MEDICINE

## 2018-07-24 PROCEDURE — 87070 CULTURE OTHR SPECIMN AEROBIC: CPT | Performed by: HOSPITALIST

## 2018-07-24 PROCEDURE — 77030020120 HC VLV RESP PEP HI -B

## 2018-07-24 PROCEDURE — 81003 URINALYSIS AUTO W/O SCOPE: CPT | Performed by: HOSPITALIST

## 2018-07-24 RX ORDER — TADALAFIL 20 MG/1
40 TABLET ORAL DAILY
Status: DISCONTINUED | OUTPATIENT
Start: 2018-07-25 | End: 2018-07-24

## 2018-07-24 RX ORDER — DOXYCYCLINE 100 MG/1
100 CAPSULE ORAL EVERY 12 HOURS
Status: DISCONTINUED | OUTPATIENT
Start: 2018-07-24 | End: 2018-07-27 | Stop reason: HOSPADM

## 2018-07-24 RX ORDER — GUAIFENESIN 600 MG/1
1200 TABLET, EXTENDED RELEASE ORAL 2 TIMES DAILY
Status: DISCONTINUED | OUTPATIENT
Start: 2018-07-24 | End: 2018-07-27 | Stop reason: HOSPADM

## 2018-07-24 RX ADMIN — DILTIAZEM HYDROCHLORIDE 30 MG: 30 TABLET, FILM COATED ORAL at 17:06

## 2018-07-24 RX ADMIN — TADALAFIL 40 MG: 20 TABLET ORAL at 03:00

## 2018-07-24 RX ADMIN — DILTIAZEM HYDROCHLORIDE 30 MG: 30 TABLET, FILM COATED ORAL at 06:30

## 2018-07-24 RX ADMIN — PERFLUTREN 1 ML: 6.52 INJECTION, SUSPENSION INTRAVENOUS at 10:00

## 2018-07-24 RX ADMIN — VITAMIN D, TAB 1000IU (100/BT) 2000 UNITS: 25 TAB at 09:40

## 2018-07-24 RX ADMIN — GENTAMICIN SULFATE 1 DROP: 3 SOLUTION OPHTHALMIC at 09:46

## 2018-07-24 RX ADMIN — DOXYCYCLINE HYCLATE 100 MG: 100 CAPSULE ORAL at 22:25

## 2018-07-24 RX ADMIN — DILTIAZEM HYDROCHLORIDE 30 MG: 30 TABLET, FILM COATED ORAL at 12:22

## 2018-07-24 RX ADMIN — Medication 10 ML: at 17:07

## 2018-07-24 RX ADMIN — GUAIFENESIN 1200 MG: 600 TABLET, EXTENDED RELEASE ORAL at 17:06

## 2018-07-24 RX ADMIN — ACETAMINOPHEN 650 MG: 325 TABLET ORAL at 22:25

## 2018-07-24 RX ADMIN — Medication 400 MG: at 09:38

## 2018-07-24 RX ADMIN — LEVOTHYROXINE SODIUM 112 MCG: 112 TABLET ORAL at 06:30

## 2018-07-24 RX ADMIN — SPIRONOLACTONE 12.5 MG: 25 TABLET, FILM COATED ORAL at 06:29

## 2018-07-24 RX ADMIN — Medication 5 ML: at 06:29

## 2018-07-24 RX ADMIN — AZTREONAM 1 G: 1 INJECTION, POWDER, LYOPHILIZED, FOR SOLUTION INTRAMUSCULAR; INTRAVENOUS at 17:11

## 2018-07-24 RX ADMIN — GENTAMICIN SULFATE 1 DROP: 3 SOLUTION OPHTHALMIC at 17:19

## 2018-07-24 RX ADMIN — GUAIFENESIN 600 MG: 600 TABLET, EXTENDED RELEASE ORAL at 09:39

## 2018-07-24 RX ADMIN — DOXYCYCLINE 100 MG: 100 INJECTION, POWDER, LYOPHILIZED, FOR SOLUTION INTRAVENOUS at 09:40

## 2018-07-24 RX ADMIN — ZOLPIDEM TARTRATE 5 MG: 5 TABLET ORAL at 22:25

## 2018-07-24 RX ADMIN — Medication 400 MG: at 17:07

## 2018-07-24 RX ADMIN — ESTRADIOL 0.5 MG: 1 TABLET ORAL at 09:39

## 2018-07-24 RX ADMIN — HEPARIN SODIUM 5000 UNITS: 5000 INJECTION, SOLUTION INTRAVENOUS; SUBCUTANEOUS at 09:39

## 2018-07-24 RX ADMIN — Medication 10 ML: at 22:25

## 2018-07-24 RX ADMIN — HEPARIN SODIUM 5000 UNITS: 5000 INJECTION, SOLUTION INTRAVENOUS; SUBCUTANEOUS at 17:06

## 2018-07-24 RX ADMIN — FUROSEMIDE 20 MG: 40 TABLET ORAL at 09:39

## 2018-07-24 RX ADMIN — GENTAMICIN SULFATE 1 DROP: 3 SOLUTION OPHTHALMIC at 22:25

## 2018-07-24 RX ADMIN — LOPERAMIDE HYDROCHLORIDE 2 MG: 2 CAPSULE ORAL at 22:25

## 2018-07-24 NOTE — PROGRESS NOTES
[facility] CARDIOLOGY PROGRESS NOTE 
      
 
7/24/2018 8:19 AM 
 
Admit Date: 7/23/2018 Subjective:  
PAtient denies any chest pain. Complains of diarrhea and N/V overnight. Eating breakfast this AM.  Hemodynamically stable. No fevers. Heart rate improved. ROS: 
Cardiovascular:  As noted above Objective:  
  
Vitals:  
 07/23/18 7359 07/24/18 0302 07/24/18 4105 07/24/18 9735 BP: 144/84 114/73  124/84 Pulse: 92 83  75 Resp: 18 16  18 Temp: 98.3 °F (36.8 °C) 98.3 °F (36.8 °C)  97.9 °F (36.6 °C) SpO2: 96% 97%  99% Weight:   74.6 kg (164 lb 6.4 oz) Height:      
 
 
Physical Exam: 
General-No Acute Distress Neck- supple, no JVD 
CV- regular rate and rhythm no MRG Lung- clear bilaterally Abd- soft, nontender, nondistended Ext- no edema bilaterally. Skin- warm and dry Data Review:  
Recent Labs  
   07/24/18 
 3868  07/23/18 
 7938 NA  135*  134* K  3.9  3.7 BUN  11  13 CREA  0.77  1.16* GLU  91  126* WBC  8.3  14.7* HGB  11.9  14.2 HCT  36.0  42.9 PLT  220  284 Lab Results Component Value Date/Time TROIQ 0.73 () 07/23/2018 02:06 PM  
 TROIQ 0.85 ([facility]) 07/23/2018 11:25 AM  
 
 
Assessment/Plan:  
 
Principal Problem: 
  Community acquired bacterial pneumonia (7/23/2018) Antibiotics per primary team.   
 
Active Problems: Hypertension (4/9/2014) BP controlled. Pulmonary hypertension ([address] (6/15/2015) Assess with echo today. Acquired hypothyroidism (11/10/2015) On replacement. Pneumonia (7/23/2018) See above. Elevated troponin (7/23/2018) Elevated troponin due to supply/demand mismatch in setting of pneumonia. Check echo. On ASA. No indication for anticoagulation. ON low dose Cardizem as cannot take B-blocker for tachycardia.    
 
 
 
 
 
 
 
 
Julissa Briseno MD 
7/24/2018 8:19 AM

## 2018-07-24 NOTE — PROGRESS NOTES
Patient has been resting quietly throughout night, up and ambulatory on RA, pt tolerated well, c/o diarrhea, prn imodium given, patient denies needs, call light within reach.

## 2018-07-24 NOTE — PROGRESS NOTES
Hospitalist Progress Note    2018  Admit Date: 2018  6:05 AM   NAME: Darwin Sears   :  1940   DOS:              18  MRN:  [id number]   Attending: Tristan Davalos MD  PCP:  Dilip Watson MD  Treatment Team: Attending Provider: Milana Matamoros MD; Consulting Provider: Lois Hernandez MD; Utilization Review: Kait Quintana RN; Transitional Nurse Navigator: Alfredo Pereira RN    DNR     SUBJECTIVE:   As previously documented: \"Ms. Babs Zamora is a  67 yo F with PMHx of  Mild CAD (L and R cath  On 3/9/018), dilated cardiomyopathy, chronic congestive heart failure, pulmonary hypertension,  hypertension, hyperlipidemia, hypothyroidism, chronic respiratory failure on NC 2.5L who was admitted on  for cough and shortness of breath thought to be due to LLL pneumonia. Started on . doxycyline due to multiple abx allergies. \"             18   Ms. Andreia Rivera  States that is feeling better. Afebrile. Denies SOB, CP although continues to complain of productive cough. Was admitted in [place] 1 month prior for acute exacerbation of CHF. TTE with EF:55-60%. 10+ ROS reviewed and negative except for positive in HPI. Allergies   Allergen Reactions    Other Food Anaphylaxis     shellfish    Esomeprazole Magnesium Rash    Iodinated Contrast- Oral And Iv Dye Swelling and Anaphylaxis     IVP Dye  Had tongue and throat swelling 25 years ago (). Pt has had steroids and benadryl with CT scan dye since and did fine.     Iodine And Iodide Containing Products Anaphylaxis    Shellfish Derived Anaphylaxis    Amitriptyline Hives    Amlodipine Hives    Amoxicillin Other (comments) and Hives    Benicar [Olmesartan] Hives    Bystolic [Nebivolol] Hives    Flecainide Rash    Hydrochlorothiazide Hives    Ibuprofen Hives    Iodine Unknown (comments)    Keflex [Cephalexin] Rash and Hives    Levofloxacin (Bulk) Hives    Lisinopril Hives    Lisinopril-Hydrochlorothiazide Rash  Nexium [Esomeprazole Magnesium] Hives    Nitrofurantoin Hives    Nitrofurantoin Macrocrystal Rash    Nitrofurantoin Macrocrystalline Other (comments)    Sulfa (Sulfonamide Antibiotics) Hives     Current Facility-Administered Medications   Medication Dose Route Frequency    doxycycline (VIBRAMYCIN) capsule 100 mg  100 mg Oral Q12H    sodium chloride (NS) flush 5-10 mL  5-10 mL IntraVENous Q8H    sodium chloride (NS) flush 5-10 mL  5-10 mL IntraVENous PRN    acetaminophen (TYLENOL) tablet 650 mg  650 mg Oral Q4H PRN    naloxone (NARCAN) injection 0.4 mg  0.4 mg IntraVENous PRN    HYDROcodone-acetaminophen (NORCO) 5-325 mg per tablet 1 Tab  1 Tab Oral Q4H PRN    ondansetron (ZOFRAN) injection 4 mg  4 mg IntraVENous Q4H PRN    diphenhydrAMINE (BENADRYL) injection 12.5 mg  12.5 mg IntraVENous Q4H PRN    zolpidem (AMBIEN) tablet 5 mg  5 mg Oral QHS PRN    heparin (porcine) injection 5,000 Units  5,000 Units SubCUTAneous Q8H    levothyroxine (SYNTHROID) tablet 112 mcg  112 mcg Oral ACB    furosemide (LASIX) tablet 20 mg  20 mg Oral DAILY    magnesium oxide (MAG-OX) tablet 400 mg  400 mg Oral BID    cholecalciferol (VITAMIN D3) tablet 2,000 Units  2,000 Units Oral DAILY    spironolactone (ALDACTONE) tablet 12.5 mg  12.5 mg Oral 7am    estradiol (ESTRACE) tablet 0.5 mg  0.5 mg Oral DAILY    gentamicin (GARAMYCIN) 0.3 % ophthalmic solution 1 Drop  1 Drop Right Eye QID    sodium chloride (OCEAN) 0.65 % nasal squeeze bottle 2 Spray  2 Spray Both Nostrils Q2H PRN    guaiFENesin ER (MUCINEX) tablet 600 mg  600 mg Oral BID    dilTIAZem (CARDIZEM) IR tablet 30 mg  30 mg Oral TIDAC    tadalafil (antihypertensive) (ADCIRCA) tablet 40 mg (Patient Supplied)  40 mg Oral DAILY    loperamide (IMODIUM) capsule 2 mg  2 mg Oral Q6H PRN         Immunization History   Administered Date(s) Administered    Influenza Vaccine 09/01/2013, 10/01/2014, 10/02/2015, 10/18/2016    Influenza Vaccine (Quad) Mdck Pf 10/03/2017    Pneumococcal Conjugate (PCV-13) 2016    Pneumococcal Vaccine (Unspecified Type) 2011    Zoster Vaccine, Live 2015     Objective:     Patient Vitals for the past 24 hrs:   Temp Pulse Resp BP SpO2   18 1142 97.5 °F (36.4 °C) 75 18 128/73 96 %   18 0734 97.9 °F (36.6 °C) 75 18 124/84 99 %   18 0302 98.3 °F (36.8 °C) 83 16 114/73 97 %   18 2305 98.3 °F (36.8 °C) 92 18 144/84 96 %   18 1934 98.9 °F (37.2 °C) 84 20 124/81 99 %   18 1533 98.4 °F (36.9 °C) 64 18 134/73 98 %     Temp (24hrs), Av.2 °F (36.8 °C), Min:97.5 °F (36.4 °C), Max:98.9 °F (37.2 °C)    Oxygen Therapy  O2 Sat (%): 96 % (18 1142)  Pulse via Oximetry: 80 beats per minute (1858)  O2 Device: Nasal cannula (18)  O2 Flow Rate (L/min): 2.5 l/min (pt wears 2.5 L at home) (18)  Oxygen Therapy  O2 Sat (%): 96 % (18 1142)  Pulse via Oximetry: 80 beats per minute (1858)  O2 Device: Nasal cannula (18)  O2 Flow Rate (L/min): 2.5 l/min (pt wears 2.5 L at home) (18)    Physical Exam:  General:         Alert, cooperative, no acute  Distress. Afebrile. HEENT:               NCAT. No obvious deformity. Nares normal. No drainage  Lungs:               Decreased air entry b/l. Mario Alberto Nakayama No wheezing/rhonchi/rales  RA  Cardiovascular:   RRR. No m/r/g. Trace pitting pedal edema b/l. +2 PT/DT pulses b/l. Abdomen:       S/nt/nd. Bowel sounds normal. .   Skin:         No rashes or lesions. Not Jaundiced  Neurologic:    AAOx3. No gross focal deficit. Moves all extremities. Psychiatric:         Good mood. Normal affect. Denies any SI/HI.                DIAGNOSTIC STUDIES      Data Review:   Recent Results (from the past 24 hour(s))   METABOLIC PANEL, BASIC    Collection Time: 18  6:13 AM   Result Value Ref Range    Sodium 135 (L) 136 - 145 mmol/L    Potassium 3.9 3.5 - 5.1 mmol/L    Chloride 100 98 - 107 mmol/L    CO2 27 21 - 32 mmol/L    Anion gap 8 7 - 16 mmol/L    Glucose 91 65 - 100 mg/dL    BUN 11 8 - 23 MG/DL    Creatinine 0.77 0.6 - 1.0 MG/DL    GFR est AA >60 >60 ml/min/1.73m2    GFR est non-AA >60 >60 ml/min/1.73m2    Calcium 8.8 8.3 - 10.4 MG/DL   CBC WITH AUTOMATED DIFF    Collection Time: 07/24/18  6:13 AM   Result Value Ref Range    WBC 8.3 4.3 - 11.1 K/uL    RBC 3.83 (L) 4.05 - 5.25 M/uL    HGB 11.9 11.7 - 15.4 g/dL    HCT 36.0 35.8 - 46.3 %    MCV 94.0 79.6 - 97.8 FL    MCH 31.1 26.1 - 32.9 PG    MCHC 33.1 31.4 - 35.0 g/dL    RDW 13.9 11.9 - 14.6 %    PLATELET 446 123 - 883 K/uL    MPV 9.4 (L) 10.8 - 14.1 FL    DF AUTOMATED      NEUTROPHILS 61 43 - 78 %    LYMPHOCYTES 21 13 - 44 %    MONOCYTES 14 (H) 4.0 - 12.0 %    EOSINOPHILS 3 0.5 - 7.8 %    BASOPHILS 0 0.0 - 2.0 %    IMMATURE GRANULOCYTES 1 0.0 - 5.0 %    ABS. NEUTROPHILS 5.2 1.7 - 8.2 K/UL    ABS. LYMPHOCYTES 1.7 0.5 - 4.6 K/UL    ABS. MONOCYTES 1.1 0.1 - 1.3 K/UL    ABS. EOSINOPHILS 0.2 0.0 - 0.8 K/UL    ABS. BASOPHILS 0.0 0.0 - 0.2 K/UL    ABS. IMM. GRANS. 0.1 0.0 - 0.5 K/UL       All Micro Results     Procedure Component Value Units Date/Time    CULTURE, BLOOD [[id number]] Collected:  07/23/18 0810    Order Status:  Completed Specimen:  Blood Updated:  07/24/18 0950     Special Requests: LEFT ANTECUBITAL        Culture result: NO GROWTH 1 DAY       CULTURE, BLOOD [[id number]] Collected:  07/23/18 0810    Order Status:  Completed Specimen:  Blood Updated:  07/24/18 0950     Special Requests: --        LEFT  HAND       Culture result: NO GROWTH 1 DAY       CULTURE, BLOOD, PAIRED [[id number]] Collected:  07/23/18 3969    Order Status:  Canceled Specimen:  Blood           Imaging Verdene Severin /Studies:    CXR Results  (Last 48 hours)               07/23/18 0629  XR CHEST PORT Final result    Impression:  IMPRESSION: Small focus of left lower lobe atelectasis or pneumonia.                Narrative:  EXAM:  XR CHEST PORT       INDICATION:  sob       COMPARISON:  10/11/2017 FINDINGS: A portable AP radiograph of the chest was obtained at 0607 hours. The   patient is on a cardiac monitor. Left lower lobe airspace disease. .  The   cardiac and mediastinal contours and pulmonary vascularity are normal.  The   bones and soft tissues are grossly within normal limits. Results for orders placed or performed during the hospital encounter of 18   2D ECHO COMPLETE ADULT (TTE) W OR WO CONTR    Narrative    Brenda Thomas [address] [facility], [address] [facility]  [phone]    Transthoracic Echocardiogram  2D, M-mode, Doppler, and Color Doppler    Patient: Catherine Browning  MR #: [id number]  : 1940  Age: 66 years  Gender: Female  Study date: 2018  Account #: [de-identified]  Height: 60.6 in  Weight: 163.7 lb  BSA: 1.73 mï¾²  Status:Routine  Location: 829  BP: 124/ 84    Allergies: OTHER FOOD, ESOMEPRAZOLE MAGNESIUM, IODINATED CONTRAST- ORAL AND   IV  DYE, IODINE AND IODIDE CONTAINING PRODUCTS, SHELLFISH DERIVED, AMITRIPTYLINE,  AMLODIPINE, AMOXICILLIN, OLMESARTAN, NEBIVOLOL, FLECAINIDE,  HYDROCHLOROTHIAZIDE, IBUPROFEN, IODINE, CEPHALEXIN, LEVOFLOXACIN (BULK),  LISINOPRIL, LISINOPRIL-HYDROCHLOROTHIAZIDE, ESOMEPRAZOLE MAGNESIUM,  NITROFURANTOIN, NITROFURANTOIN MACROCRYSTAL, NITROFURANTOIN MACROCRYSTALLINE,  SULFA (SULFONAMIDE ANTIBIOTICS)    Sonographer:  Zeke Burroughs RD  Group:  [id number] Cardiology  Referring Physician:  Carla Irizarry MD [facility]  Reading Physician:  BARRY Banks MD [facility]    INDICATIONS: Dyspnea, Elevated Troponin. PROCEDURE: This was a routine study. A transthoracic echocardiogram was  performed. The study included complete 2D imaging, M-mode, complete spectral  Doppler, and color Doppler. Intravenous contrast (Definity) was administered. Image quality was adequate. LEFT VENTRICLE: Size was normal. Systolic function was normal. Ejection  fraction was estimated in the range of 55 % to 60 %. There were no regional  wall motion abnormalities. Wall thickness was normal. The E/e' ratio was   8. 15. Diastolic function was indeterminate. The study was not technically   sufficient  to allow evaluation of LV diastolic function. RIGHT VENTRICLE: The size was normal. Systolic function was normal. The  tricuspid jet envelope definition was inadequate for estimation of RV   systolic  pressure. LEFT ATRIUM: The atrium was mildly dilated. RIGHT ATRIUM: The atrium was mildly dilated. SYSTEMIC VEINS: IVC: The inferior vena cava was normal in size and course. AORTIC VALVE: The valve was trileaflet. Leaflets exhibited mild sclerosis. There was no evidence for stenosis. There was no insufficiency. MITRAL VALVE: Valve structure was normal. There was no evidence for stenosis. There was mild regurgitation. TRICUSPID VALVE: The valve structure was normal. There was no evidence for  stenosis. There was mild regurgitation. PULMONIC VALVE: The valve structure was normal. There was no evidence for  stenosis. There was mild regurgitation. PERICARDIUM: There was no pericardial effusion. AORTA: The root exhibited normal size. SUMMARY:    -  Left ventricle: Systolic function was normal. Ejection fraction was  estimated in the range of 55 % to 60 %. There were no regional wall motion  abnormalities. -  Left atrium: The atrium was mildly dilated. -  Right atrium: The atrium was mildly dilated. -  Aortic valve: The valve was trileaflet. Leaflets exhibited mild sclerosis. -  Mitral valve: There was mild regurgitation.    -  Tricuspid valve: There was mild regurgitation.     SYSTEM MEASUREMENT TABLES    2D mode  AoR Diam (2D): 2.8 cm  LA Dimension (2D): 4.1 cm  Left Atrium Systolic Volume Index; Method of Disks, Biplane; 2D mode;: 32.7  ml/m2  IVS/LVPW (2D): 3.1  IVSd (2D): 0.9 cm  LVIDd (2D): 4.5 cm  LVIDs (2D): 3.2 cm  LVOT Area (2D): 3.1 cm2  LVPWd (2D): 1 cm  RVIDd (2D): 3.4 cm    Tissue Doppler Imaging  LV Peak Early Guerrero Tissue Refugio; Lateral MA (TDI): 9 cm/s  LV Peak Early Guerrero Tissue Refugio; Medial MA (TDI): 6.9 cm/s    Unspecified Scan Mode  Peak Grad; Mean; Antegrade Flow: 9 mm[Hg]  Vmax; Antegrade Flow: 151 cm/s  LVOT Diam: 2 cm  MV Peak Refugio/LV Peak Tissue Refugio E-Wave; Lateral MA: 7.1  MV Peak Refugio/LV Peak Tissue Refugio E-Wave; Medial MA: 9.2    Prepared and signed by    BARRY Schreiber MD [facility]  Signed 24-Jul-2018 13:49:12         Labs and Studies from previous 24 hours have been personally reviewed by myself [address] [facility] as of 7/24/2018  Date Reviewed: 7/18/2018          Codes Class Noted - Resolved POA    * (Principal)Left lower lobe pneumonia ([facility] ICD-10-CM: J18.1  ICD-9-CM: 990  7/23/2018 - Present Yes        Acute conjunctivitis ICD-10-CM: H10.30  ICD-9-CM: 372.00  7/23/2018 - Present Yes        Pneumonia ICD-10-CM: J18.9  ICD-9-CM: 439  7/23/2018 - Present         Elevated troponin ICD-10-CM: R74.8  ICD-9-CM: 790.6  7/23/2018 - Present Yes        Acquired hypothyroidism (Chronic) ICD-10-CM: E03.9  ICD-9-CM: 244.9  11/10/2015 - Present Yes        Pulmonary hypertension ([facility].) ICD-10-CM: I27.20  ICD-9-CM: 416.8  6/15/2015 - Present Yes    Overview Addendum 11/10/2015  8:52 AM by Gil Simon MD     Pulmonary Hypertension: had heart cath 4/16/15. At that time her mPAP was only 22, though her systolic PAP was elevated in the 50's and her PVR was elevated at 6. The low mPAP at that time is not conistent with the current TTE findings of RVSP of 110. She has an autoimmune process that according to her has not been labelled as a specific entity by her rheumatologists in the past.   [address] with vaso testing:  BASELINE MEASUREMENTS:  Pulmonary artery 70/30, mean of 40. Pulmonary artery saturation 70%. Cardiac output 3.2 L/min. Cardiac index 1.8 L/min per meter squared. Adenosine 10 mcg/kg per minute: Pulmonary artery 65/30, mean 40. Cardiac  output 3.1 L/min. Cardiac index 1.8 L/min. Pulmonary artery saturation  74%. Adenosine 30 mcg/kg per minute: Pulmonary artery 50/28, mean 38. Cardiac output 3.4 L/min. Cardiac index 2.0 L/min per meter squared. Pulmonary artery saturation 76%. Adenosine 50 mcg/kg per minute: Pulmonary artery 48/22, mean pulmonary  artery 37. Cardiac output 3.8 L/min. Cardiac index 2.15 L/min per meter squared. Pulmonary artery 77%. Pulmonary capillary wedge pressure is 5-6 mmHg. Mean transpulmonary gradient at baseline 35, without significant change  at peak adenosine. Non resposive PAHTN ( MPAP should decrease by at least 10 and to below 40 mmHg)  CaCb likely not beneficial.             Hyperlipidemia (Chronic) ICD-10-CM: E78.5  ICD-9-CM: 272.4  4/9/2014 - Present Yes        Hypertension (Chronic) ICD-10-CM: I10  ICD-9-CM: 401.9  4/9/2014 - Present Yes        RESOLVED: Cardiomyopathy, dilated (HCC) (Chronic) ICD-10-CM: I42.0  ICD-9-CM: 425.4  11/14/2017 - 7/24/2018 Yes        RESOLVED: CAD (coronary artery disease) (Chronic) ICD-10-CM: I25.10  ICD-9-CM: 414.00  4/9/2014 - 7/24/2018 Yes    Overview Addendum 6/11/2015  4:42 PM by Dickson Cowden, PA     Moderate CAD 4/15                   Plan:  LLL Pneumonia:   -Add aztreonam to Doxyccyline . Was in hospital 1 month prior.  - follow BC 2/2 negative at 1 days  - get sputum cultures  - Mucinex  - check pro calcitonin    Chronic dCHF:  - continue current management  - TTE with EF:50-65%  - daily weight  - strict I/O    Elevated Trop I:   - stable  - no SOB, CP  - TTE: EF:50-65%  - hx lf L and R cath on 3/2018  - consider cardiology input    HTN:  - BP well control  - continue current management      HLP:  - not on statins  - check lipid panel    Acute conjunctivitis:  -on  Gentamycin  - improving    Hypothyroidism:   - on Levothyroxine  - stable     Debility:  -PT/CM      DVT Prophylaxis: Heparin SQ  CODE Status: DNR  Plan of Care Discussed with: patient.  Care team.  Medical Risk: high  Disposition: pending    Bakari Morales MD  07/24/18

## 2018-07-24 NOTE — PROGRESS NOTES
Respiratory Care Services Policy Number: -[id number] Title: Bronchial Hygiene Protocol Effective Date: 01/1999 Revised Date: 12/2014 Reviewed Date: 06/2013, 05/2014, 03/2015, 03/2016, 06/2017 I. Purpose: The Respiratory Care Practitioner (RCP) will utilize the following protocol to select and initiate bronchial hygiene therapy to open and maintain obstructed airways when indicated. II. Patients: All patients who are ordered bronchial hygiene therapy. III. Clinical Area: All general patient floors IV. Protocol: The following conditions or diseases are indications for bronchial hygiene  
                        therapy. A. Oscillating PEP Therapy Indications should include: 1. Atelectasis caused by mucus plugging or foreign body 2. Chronic mucociliary clearance disorders 3. Retained secretions which may be associated with the following conditions: 
a. Bronchitis b. Bronchiectasis 
c. Pneumonia B. Chest physical therapy (Percussion/Vibration and Postural Drainage) Indications are: 1. Atelectasis caused by mucus plugging or foreign body 2. Chronic mucociliary clearance disorders 3. Neuromuscular diseases with impaired cough efficiency 4. Retained secretions which may be associated with the following conditions: 
a. Bronchitis/emphysema/asthma 
b. Bronchiectasis/cystic fibrosis 
c. Post operative atelectasis 
d. Following foreign body extraction C. PAP (Positive airway pressure therapy), i.e. EZPAP indications include: 1. Patients with post-operative atelectasis or to prevent post operative atelectasis. 2. Patients who cannot perform deep breathing exercises due to pain. 3. Patients requiring lung expansion therapy who cannot follow instructions. 4. Patients requiring lung expansion therapy with poor inspiratory capacity <10cc/kg. 5. Patients requiring aerosol therapy in conjunction with opening their airways.  
D. Bronchoscopy and nasotracheal suctioning indications should include: 
1. Inability to cough effectively 2. Excessive secretions 3. Artificial airway V. Equipment: A. Valora Ling B. Vest therapy Paul Roldan VI. Guidelines:   Monitor patients vital signs and evaluate patients clinical status. The need to  
                  change therapy modality may be indicated by: A. Change in patients sensorium (patient now confused or obtunded, and unable to follow directions). B. Significant worsening of dyspnea C. A significant deterioration is evident on patients chest radiograph or increased sputum production. D. Increased thickening of secretions (e.g. mucolytic therapy may be indicated.) E. Development of wheezing F. Decreasing SaO2 
G. Chest pain 
 
VII. Clinical Responsibility: The Therapy Assessment Protocol guidelines will be used to  
             re-evaluate all patients on bronchial hygiene therapy (See Therapy Assessment Protocol). Blaine Bee will perform changes in therapy according to protocol. Memo Lagos B. Bronchial hygiene therapy may be discontinued when goals of therapy are met, e.g., secretions easily expectorated for 48 hours, atelectasis is resolved, etc. 
C. PAP Therapy may be utilized in place of IPPB therapy per discretion of the RCP, as approved by the Pulmonary Medicine and [address] [place] [address]. VIII. Outcome Criteria:  Outcome criteria for bronchial hygiene therapy should include: A. Decrease in sputum production B. Improved breath sounds C. Improved arterial oxygen tension and/or SaO2 
D. Improved chest X-ray E. Subjective response to therapy IX. Documentation A. Document assessment findings in the respiratory assessment section of the patients EMR. B. Document changes in therapy per protocol in the respiratory orders section and in the care plan section of the patients EMR. C. Document patient education in the patient education section of the patients EMR.  
 
X. Related Protocols: A. Respiratory Patient Care Protocols B. Incentive Spirometry Protocol C. Aerosolized Medication Protocol D. Oxygen Therapy Protocol Reference:

## 2018-07-24 NOTE — PROGRESS NOTES
Patient is a potential Pneumonia Bundle Payment for Care Improvement (BPCI) patient and will be followed for 90 days post acute care. RRAT- 16. PCP- Jose M Youngblood III- will need follow up with PCP 3-5 days after discharge. Cardiology- Roberta Neville at [facility]- Dr Grant Rashid. Pneumonia education given along with flutter valve. Hx CHF- Cardiology following. Will need PT consult. Lives at home with fiance. Has home oxygen and Rollator walker. Independent and drives. Recent travel to [place] and [place]- hospital visit for CHF and Pulmonary HTN. Patient eligible for the Transitions of Care Wellness program.  This program includes a Health  that could provide in-home visits, phone calls after discharge, and a 24 hour HelpLine. Spoke with patient, and she has agreed to home visits. 24 hour Help Line number given. Had [facility] in the past, doubt she will be homebound for Home Care at this time. DME: [employer] for oxygen. Care Management Interventions  PCP Verified by CM:  Yes  Last Visit to PCP: 07/06/18  Transition of Care Consult (CM Consult): Discharge Planning  Physical Therapy Consult: Yes  Current Support Network: Own Home  Confirm Follow Up Transport: Self  Plan discussed with Pt/Family/Caregiver: Yes  Freedom of Choice Offered: Yes  Discharge Location  Discharge Placement: Home    Kasi Silva RN- [facility], BSN  Care Transition/ [facility], [facility]  [phone]

## 2018-07-24 NOTE — PROGRESS NOTES
Patient is sitting up in bed, on O2 n/c, RR even and unlabored, R lung sounds coarse, admits to productive cough, no c/o pain or discomfort,  denies needs, call light within reach.

## 2018-07-25 LAB
ALBUMIN SERPL-MCNC: 2.6 G/DL (ref 3.2–4.6)
ALBUMIN/GLOB SERPL: 0.6 {RATIO} (ref 1.2–3.5)
ALP SERPL-CCNC: 80 U/L (ref 50–136)
ALT SERPL-CCNC: 19 U/L (ref 12–65)
ANION GAP SERPL CALC-SCNC: 10 MMOL/L (ref 7–16)
AST SERPL-CCNC: 26 U/L (ref 15–37)
BASOPHILS # BLD: 0 K/UL (ref 0–0.2)
BASOPHILS NFR BLD: 0 % (ref 0–2)
BILIRUB SERPL-MCNC: 0.6 MG/DL (ref 0.2–1.1)
BUN SERPL-MCNC: 9 MG/DL (ref 8–23)
CALCIUM SERPL-MCNC: 9.2 MG/DL (ref 8.3–10.4)
CHLORIDE SERPL-SCNC: 100 MMOL/L (ref 98–107)
CHOLEST SERPL-MCNC: 131 MG/DL
CO2 SERPL-SCNC: 25 MMOL/L (ref 21–32)
CREAT SERPL-MCNC: 0.66 MG/DL (ref 0.6–1)
DIFFERENTIAL METHOD BLD: ABNORMAL
EOSINOPHIL # BLD: 0.3 K/UL (ref 0–0.8)
EOSINOPHIL NFR BLD: 4 % (ref 0.5–7.8)
ERYTHROCYTE [DISTWIDTH] IN BLOOD BY AUTOMATED COUNT: 13.8 % (ref 11.9–14.6)
GLOBULIN SER CALC-MCNC: 4.2 G/DL (ref 2.3–3.5)
GLUCOSE SERPL-MCNC: 96 MG/DL (ref 65–100)
HCT VFR BLD AUTO: 35.4 % (ref 35.8–46.3)
HDLC SERPL-MCNC: 59 MG/DL (ref 40–60)
HDLC SERPL: 2.2 {RATIO}
HGB BLD-MCNC: 11.7 G/DL (ref 11.7–15.4)
IMM GRANULOCYTES # BLD: 0.1 K/UL (ref 0–0.5)
IMM GRANULOCYTES NFR BLD AUTO: 1 % (ref 0–5)
LDLC SERPL CALC-MCNC: 60.2 MG/DL
LIPASE SERPL-CCNC: 71 U/L (ref 73–393)
LIPID PROFILE,FLP: NORMAL
LYMPHOCYTES # BLD: 1.4 K/UL (ref 0.5–4.6)
LYMPHOCYTES NFR BLD: 20 % (ref 13–44)
MCH RBC QN AUTO: 30.7 PG (ref 26.1–32.9)
MCHC RBC AUTO-ENTMCNC: 33.1 G/DL (ref 31.4–35)
MCV RBC AUTO: 92.9 FL (ref 79.6–97.8)
MONOCYTES # BLD: 1 K/UL (ref 0.1–1.3)
MONOCYTES NFR BLD: 14 % (ref 4–12)
NEUTS SEG # BLD: 4.3 K/UL (ref 1.7–8.2)
NEUTS SEG NFR BLD: 61 % (ref 43–78)
PLATELET # BLD AUTO: 262 K/UL (ref 150–450)
PMV BLD AUTO: 9.4 FL (ref 10.8–14.1)
POTASSIUM SERPL-SCNC: 3.8 MMOL/L (ref 3.5–5.1)
PROCALCITONIN SERPL-MCNC: 0.2 NG/ML
PROT SERPL-MCNC: 6.8 G/DL (ref 6.3–8.2)
RBC # BLD AUTO: 3.81 M/UL (ref 4.05–5.25)
SODIUM SERPL-SCNC: 135 MMOL/L (ref 136–145)
TRIGL SERPL-MCNC: 59 MG/DL (ref 35–150)
TROPONIN I SERPL-MCNC: 0.17 NG/ML (ref 0.02–0.05)
VLDLC SERPL CALC-MCNC: 11.8 MG/DL (ref 6–23)
WBC # BLD AUTO: 7.1 K/UL (ref 4.3–11.1)

## 2018-07-25 PROCEDURE — 97530 THERAPEUTIC ACTIVITIES: CPT

## 2018-07-25 PROCEDURE — 74011250637 HC RX REV CODE- 250/637: Performed by: INTERNAL MEDICINE

## 2018-07-25 PROCEDURE — 85025 COMPLETE CBC W/AUTO DIFF WBC: CPT | Performed by: HOSPITALIST

## 2018-07-25 PROCEDURE — 65660000000 HC RM CCU STEPDOWN

## 2018-07-25 PROCEDURE — 74011000250 HC RX REV CODE- 250: Performed by: HOSPITALIST

## 2018-07-25 PROCEDURE — 80053 COMPREHEN METABOLIC PANEL: CPT | Performed by: HOSPITALIST

## 2018-07-25 PROCEDURE — 84484 ASSAY OF TROPONIN QUANT: CPT | Performed by: HOSPITALIST

## 2018-07-25 PROCEDURE — 84145 PROCALCITONIN (PCT): CPT | Performed by: HOSPITALIST

## 2018-07-25 PROCEDURE — 74011000258 HC RX REV CODE- 258: Performed by: HOSPITALIST

## 2018-07-25 PROCEDURE — 36415 COLL VENOUS BLD VENIPUNCTURE: CPT | Performed by: HOSPITALIST

## 2018-07-25 PROCEDURE — 74011250637 HC RX REV CODE- 250/637: Performed by: HOSPITALIST

## 2018-07-25 PROCEDURE — 74011250637 HC RX REV CODE- 250/637: Performed by: EMERGENCY MEDICINE

## 2018-07-25 PROCEDURE — 74011250636 HC RX REV CODE- 250/636: Performed by: INTERNAL MEDICINE

## 2018-07-25 PROCEDURE — 83690 ASSAY OF LIPASE: CPT | Performed by: HOSPITALIST

## 2018-07-25 PROCEDURE — 97161 PT EVAL LOW COMPLEX 20 MIN: CPT

## 2018-07-25 PROCEDURE — 80061 LIPID PANEL: CPT | Performed by: HOSPITALIST

## 2018-07-25 RX ORDER — FAMOTIDINE 20 MG/1
20 TABLET, FILM COATED ORAL 2 TIMES DAILY
Status: DISCONTINUED | OUTPATIENT
Start: 2018-07-25 | End: 2018-07-27 | Stop reason: HOSPADM

## 2018-07-25 RX ORDER — BENZONATATE 100 MG/1
100 CAPSULE ORAL
Status: DISCONTINUED | OUTPATIENT
Start: 2018-07-25 | End: 2018-07-27 | Stop reason: HOSPADM

## 2018-07-25 RX ORDER — FLUTICASONE PROPIONATE 50 MCG
2 SPRAY, SUSPENSION (ML) NASAL DAILY
Status: DISCONTINUED | OUTPATIENT
Start: 2018-07-26 | End: 2018-07-27 | Stop reason: HOSPADM

## 2018-07-25 RX ORDER — DILTIAZEM HYDROCHLORIDE 120 MG/1
120 CAPSULE, COATED, EXTENDED RELEASE ORAL DAILY
Status: DISCONTINUED | OUTPATIENT
Start: 2018-07-25 | End: 2018-07-27 | Stop reason: HOSPADM

## 2018-07-25 RX ADMIN — GUAIFENESIN 1200 MG: 600 TABLET, EXTENDED RELEASE ORAL at 17:32

## 2018-07-25 RX ADMIN — SPIRONOLACTONE 12.5 MG: 25 TABLET, FILM COATED ORAL at 05:54

## 2018-07-25 RX ADMIN — AZTREONAM 1 G: 1 INJECTION, POWDER, LYOPHILIZED, FOR SOLUTION INTRAMUSCULAR; INTRAVENOUS at 08:26

## 2018-07-25 RX ADMIN — GUAIFENESIN 1200 MG: 600 TABLET, EXTENDED RELEASE ORAL at 08:27

## 2018-07-25 RX ADMIN — HYDROCODONE BITARTRATE AND ACETAMINOPHEN 1 TABLET: 5; 325 TABLET ORAL at 21:28

## 2018-07-25 RX ADMIN — GENTAMICIN SULFATE 1 DROP: 3 SOLUTION OPHTHALMIC at 08:28

## 2018-07-25 RX ADMIN — GENTAMICIN SULFATE 1 DROP: 3 SOLUTION OPHTHALMIC at 21:31

## 2018-07-25 RX ADMIN — GENTAMICIN SULFATE 1 DROP: 3 SOLUTION OPHTHALMIC at 12:54

## 2018-07-25 RX ADMIN — HEPARIN SODIUM 5000 UNITS: 5000 INJECTION, SOLUTION INTRAVENOUS; SUBCUTANEOUS at 17:32

## 2018-07-25 RX ADMIN — AZTREONAM 1 G: 1 INJECTION, POWDER, LYOPHILIZED, FOR SOLUTION INTRAMUSCULAR; INTRAVENOUS at 17:32

## 2018-07-25 RX ADMIN — FAMOTIDINE 20 MG: 20 TABLET ORAL at 17:32

## 2018-07-25 RX ADMIN — VITAMIN D, TAB 1000IU (100/BT) 2000 UNITS: 25 TAB at 08:27

## 2018-07-25 RX ADMIN — Medication 10 ML: at 21:31

## 2018-07-25 RX ADMIN — GENTAMICIN SULFATE 1 DROP: 3 SOLUTION OPHTHALMIC at 17:34

## 2018-07-25 RX ADMIN — HEPARIN SODIUM 5000 UNITS: 5000 INJECTION, SOLUTION INTRAVENOUS; SUBCUTANEOUS at 08:28

## 2018-07-25 RX ADMIN — DILTIAZEM HYDROCHLORIDE 30 MG: 30 TABLET, FILM COATED ORAL at 05:54

## 2018-07-25 RX ADMIN — DOXYCYCLINE HYCLATE 100 MG: 100 CAPSULE ORAL at 08:27

## 2018-07-25 RX ADMIN — ZOLPIDEM TARTRATE 5 MG: 5 TABLET ORAL at 21:28

## 2018-07-25 RX ADMIN — DILTIAZEM HYDROCHLORIDE 120 MG: 120 CAPSULE, COATED, EXTENDED RELEASE ORAL at 12:51

## 2018-07-25 RX ADMIN — Medication 10 ML: at 05:53

## 2018-07-25 RX ADMIN — LEVOTHYROXINE SODIUM 112 MCG: 112 TABLET ORAL at 05:54

## 2018-07-25 RX ADMIN — BENZONATATE 100 MG: 100 CAPSULE ORAL at 13:08

## 2018-07-25 RX ADMIN — Medication 400 MG: at 08:27

## 2018-07-25 RX ADMIN — Medication 10 ML: at 17:33

## 2018-07-25 RX ADMIN — BENZONATATE 100 MG: 100 CAPSULE ORAL at 21:29

## 2018-07-25 RX ADMIN — DOXYCYCLINE HYCLATE 100 MG: 100 CAPSULE ORAL at 21:29

## 2018-07-25 RX ADMIN — TADALAFIL 40 MG: 20 TABLET ORAL at 08:37

## 2018-07-25 RX ADMIN — FUROSEMIDE 20 MG: 40 TABLET ORAL at 08:27

## 2018-07-25 RX ADMIN — FAMOTIDINE 20 MG: 20 TABLET ORAL at 13:10

## 2018-07-25 RX ADMIN — Medication 400 MG: at 17:32

## 2018-07-25 RX ADMIN — AZTREONAM 1 G: 1 INJECTION, POWDER, LYOPHILIZED, FOR SOLUTION INTRAMUSCULAR; INTRAVENOUS at 02:39

## 2018-07-25 NOTE — PROGRESS NOTES
Patient is alert and oriented X4, up and ambulatory in room, on 2L n/c, denies SOB, patient without c/o pain or discomfort, denies needs, call light within reach.

## 2018-07-25 NOTE — PROGRESS NOTES
Problem: Interdisciplinary Rounds  Goal: Interdisciplinary Rounds  Outcome: Progressing Towards Goal  Interdisciplinary team rounds were held 7/25/2018 with the following team members:Care Management, Nursing, Physical Therapy, Physician and Clinical Coordinator and the patient. Plan of care discussed. See clinical pathway and/or care plan for interventions and desired outcomes.

## 2018-07-25 NOTE — PROGRESS NOTES
Hospitalist Progress Note    2018  Admit Date: 2018  6:05 AM   NAME: Lisa Nye   :  1940   DOS:              18  MRN:  [id number]   Attending: Raymond Barahona MD  PCP:  Liv Lee MD  Treatment Team: Attending Provider: Vaughan Dubin, MD; Consulting Provider: Gunner Dai MD; Utilization Review: Oswaldo Parnell RN; Transitional Nurse Navigator: Chica Koenig RN    DNR     SUBJECTIVE:   As previously documented: \"Ms. Ferny Rand is a  65 yo F with PMHx of  Mild CAD (L and R cath  On 3/9/018), dilated cardiomyopathy, chronic congestive heart failure, pulmonary hypertension,  hypertension, hyperlipidemia, hypothyroidism, chronic respiratory failure on NC 2.5L who was admitted on  for cough and shortness of breath thought to be due to LLL pneumonia. Started on . doxycyline due to multiple abx allergies. Was admitted in [place] 1 month prior for acute exacerbation of CHF. TTE with EF:55-60%. \"           18   Ms. Andreia Rivera  complains of increased cough at bedtime and epigastric abdominal pain. Had a gastric content vomitus x1 today. Afebrile. On NC 2.5L. BC 2/2 negative . Sputum culture result pending. Afebrile. 10+ ROS reviewed and negative except for positive in HPI. Allergies   Allergen Reactions    Other Food Anaphylaxis     shellfish    Esomeprazole Magnesium Rash    Iodinated Contrast- Oral And Iv Dye Swelling and Anaphylaxis     IVP Dye  Had tongue and throat swelling 25 years ago (). Pt has had steroids and benadryl with CT scan dye since and did fine.     Iodine And Iodide Containing Products Anaphylaxis    Shellfish Derived Anaphylaxis    Amitriptyline Hives    Amlodipine Hives    Amoxicillin Other (comments) and Hives    Benicar [Olmesartan] Hives    Bystolic [Nebivolol] Hives    Flecainide Rash    Hydrochlorothiazide Hives    Ibuprofen Hives    Iodine Unknown (comments)    Keflex [Cephalexin] Rash and Hives    Levofloxacin (Bulk) Hives    Lisinopril Hives    Lisinopril-Hydrochlorothiazide Rash    Nexium [Esomeprazole Magnesium] Hives    Nitrofurantoin Hives    Nitrofurantoin Macrocrystal Rash    Nitrofurantoin Macrocrystalline Other (comments)    Sulfa (Sulfonamide Antibiotics) Hives     Current Facility-Administered Medications   Medication Dose Route Frequency    doxycycline (VIBRAMYCIN) capsule 100 mg  100 mg Oral Q12H    aztreonam (AZACTAM) 1 g in 0.9% sodium chloride (MBP/ADV) 100 mL  1 g IntraVENous Q8H    guaiFENesin ER (MUCINEX) tablet 1,200 mg  1,200 mg Oral BID    sodium chloride (NS) flush 5-10 mL  5-10 mL IntraVENous Q8H    sodium chloride (NS) flush 5-10 mL  5-10 mL IntraVENous PRN    acetaminophen (TYLENOL) tablet 650 mg  650 mg Oral Q4H PRN    naloxone (NARCAN) injection 0.4 mg  0.4 mg IntraVENous PRN    HYDROcodone-acetaminophen (NORCO) 5-325 mg per tablet 1 Tab  1 Tab Oral Q4H PRN    ondansetron (ZOFRAN) injection 4 mg  4 mg IntraVENous Q4H PRN    diphenhydrAMINE (BENADRYL) injection 12.5 mg  12.5 mg IntraVENous Q4H PRN    zolpidem (AMBIEN) tablet 5 mg  5 mg Oral QHS PRN    heparin (porcine) injection 5,000 Units  5,000 Units SubCUTAneous Q8H    levothyroxine (SYNTHROID) tablet 112 mcg  112 mcg Oral ACB    furosemide (LASIX) tablet 20 mg  20 mg Oral DAILY    magnesium oxide (MAG-OX) tablet 400 mg  400 mg Oral BID    cholecalciferol (VITAMIN D3) tablet 2,000 Units  2,000 Units Oral DAILY    spironolactone (ALDACTONE) tablet 12.5 mg  12.5 mg Oral 7am    gentamicin (GARAMYCIN) 0.3 % ophthalmic solution 1 Drop  1 Drop Right Eye QID    sodium chloride (OCEAN) 0.65 % nasal squeeze bottle 2 Spray  2 Spray Both Nostrils Q2H PRN    dilTIAZem (CARDIZEM) IR tablet 30 mg  30 mg Oral TIDAC    tadalafil (antihypertensive) (ADCIRCA) tablet 40 mg (Patient Supplied)  40 mg Oral DAILY    loperamide (IMODIUM) capsule 2 mg  2 mg Oral Q6H PRN         Immunization History   Administered Date(s) Administered    Influenza Vaccine 2013, 10/01/2014, 10/02/2015, 10/18/2016    Influenza Vaccine (Quad) Mdck Pf 10/03/2017    Pneumococcal Conjugate (PCV-13) 2016    Pneumococcal Vaccine (Unspecified Type) 2011    Zoster Vaccine, Live 2015     Objective:     Patient Vitals for the past 24 hrs:   Temp Pulse Resp BP SpO2   18 0727 97.7 °F (36.5 °C) 72 17 126/84 100 %   18 0335 98.8 °F (37.1 °C) 86 20 121/84 95 %   18 0000 97.9 °F (36.6 °C) 79 22 115/75 92 %   18 2008 97.7 °F (36.5 °C) 83 22 127/85 96 %   18 1607 97.4 °F (36.3 °C) 83 19 126/83 96 %   18 1142 97.5 °F (36.4 °C) 75 18 128/73 96 %     Temp (24hrs), Av.8 °F (36.6 °C), Min:97.4 °F (36.3 °C), Max:98.8 °F (37.1 °C)    Oxygen Therapy  O2 Sat (%): 100 % (18)  Pulse via Oximetry: 80 beats per minute (18 0958)  O2 Device: Nasal cannula (18 0940)  O2 Flow Rate (L/min): 2 l/min (18 0940)  Oxygen Therapy  O2 Sat (%): 100 % (18 0727)  Pulse via Oximetry: 80 beats per minute (18 0958)  O2 Device: Nasal cannula (1840)  O2 Flow Rate (L/min): 2 l/min (18 0940)    Physical Exam:  General:         Alert, cooperative, no acute  Distress. Afebrile. HEENT:               NCAT. No obvious deformity. Nares normal. No drainage  Lungs:               Decreased air entry b/l. No wheezing/rhonchi/rales  RA  Cardiovascular:   RRR. No m/r/g. Trace pitting pedal edema b/l. +2 PT/DT pulses b/l. Abdomen:       Soft, tender in epigastric area. Mild positive Lenz sign. No rebound tenderness.   + BS  Skin:         No rashes or lesions. Not Jaundiced  Neurologic:    AAOx3. No gross focal deficit. Moves all extremities. Psychiatric:         Good mood. Normal affect. Denies any SI/HI.                DIAGNOSTIC STUDIES      Data Review:   Recent Results (from the past 24 hour(s))   TROPONIN I    Collection Time: 18  5:08 PM   Result Value Ref Range Troponin-I, Qt. 0.28 (HH) 0.02 - 0.05 NG/ML   C REACTIVE PROTEIN, QT    Collection Time: 07/24/18  5:08 PM   Result Value Ref Range    C-Reactive protein 13.6 (H) 0.0 - 0.9 mg/dL   URINALYSIS W/ RFLX MICROSCOPIC    Collection Time: 07/24/18  7:45 PM   Result Value Ref Range    Color YELLOW      Appearance CLEAR      Specific gravity 1.011 1.001 - 1.023      pH (UA) 7.0 5.0 - 9.0      Protein NEGATIVE  NEG mg/dL    Glucose NEGATIVE  mg/dL    Ketone NEGATIVE  NEG mg/dL    Bilirubin NEGATIVE  NEG      Blood NEGATIVE  NEG      Urobilinogen 0.2 0.2 - 1.0 EU/dL    Nitrites NEGATIVE  NEG      Leukocyte Esterase NEGATIVE  NEG     CULTURE, RESPIRATORY/SPUTUM/BRONCH W GRAM STAIN    Collection Time: 07/24/18 10:30 PM   Result Value Ref Range    Special Requests: NO SPECIAL REQUESTS      GRAM STAIN PENDING     Culture result:        NO GROWTH AFTER SHORT PERIOD OF INCUBATION. FURTHER RESULTS TO FOLLOW AFTER OVERNIGHT INCUBATION. PROCALCITONIN    Collection Time: 07/25/18  5:49 AM   Result Value Ref Range    Procalcitonin 0.2 ng/mL   CBC WITH AUTOMATED DIFF    Collection Time: 07/25/18  5:49 AM   Result Value Ref Range    WBC 7.1 4.3 - 11.1 K/uL    RBC 3.81 (L) 4.05 - 5.25 M/uL    HGB 11.7 11.7 - 15.4 g/dL    HCT 35.4 (L) 35.8 - 46.3 %    MCV 92.9 79.6 - 97.8 FL    MCH 30.7 26.1 - 32.9 PG    MCHC 33.1 31.4 - 35.0 g/dL    RDW 13.8 11.9 - 14.6 %    PLATELET 986 912 - 698 K/uL    MPV 9.4 (L) 10.8 - 14.1 FL    DF AUTOMATED      NEUTROPHILS 61 43 - 78 %    LYMPHOCYTES 20 13 - 44 %    MONOCYTES 14 (H) 4.0 - 12.0 %    EOSINOPHILS 4 0.5 - 7.8 %    BASOPHILS 0 0.0 - 2.0 %    IMMATURE GRANULOCYTES 1 0.0 - 5.0 %    ABS. NEUTROPHILS 4.3 1.7 - 8.2 K/UL    ABS. LYMPHOCYTES 1.4 0.5 - 4.6 K/UL    ABS. MONOCYTES 1.0 0.1 - 1.3 K/UL    ABS. EOSINOPHILS 0.3 0.0 - 0.8 K/UL    ABS. BASOPHILS 0.0 0.0 - 0.2 K/UL    ABS. IMM.  GRANS. 0.1 0.0 - 0.5 K/UL   METABOLIC PANEL, COMPREHENSIVE    Collection Time: 07/25/18  5:49 AM   Result Value Ref Range Sodium 135 (L) 136 - 145 mmol/L    Potassium 3.8 3.5 - 5.1 mmol/L    Chloride 100 98 - 107 mmol/L    CO2 25 21 - 32 mmol/L    Anion gap 10 7 - 16 mmol/L    Glucose 96 65 - 100 mg/dL    BUN 9 8 - 23 MG/DL    Creatinine 0.66 0.6 - 1.0 MG/DL    GFR est AA >60 >60 ml/min/1.73m2    GFR est non-AA >60 >60 ml/min/1.73m2    Calcium 9.2 8.3 - 10.4 MG/DL    Bilirubin, total 0.6 0.2 - 1.1 MG/DL    ALT (SGPT) 19 12 - 65 U/L    AST (SGOT) 26 15 - 37 U/L    Alk. phosphatase 80 50 - 136 U/L    Protein, total 6.8 6.3 - 8.2 g/dL    Albumin 2.6 (L) 3.2 - 4.6 g/dL    Globulin 4.2 (H) 2.3 - 3.5 g/dL    A-G Ratio 0.6 (L) 1.2 - 3.5     LIPID PANEL    Collection Time: 07/25/18  5:49 AM   Result Value Ref Range    LIPID PROFILE          Cholesterol, total 131 <200 MG/DL    Triglyceride 59 35 - 150 MG/DL    HDL Cholesterol 59 40 - 60 MG/DL    LDL, calculated 60.2 <100 MG/DL    VLDL, calculated 11.8 6.0 - 23.0 MG/DL    CHOL/HDL Ratio 2.2     TROPONIN I    Collection Time: 07/25/18  5:49 AM   Result Value Ref Range    Troponin-I, Qt. 0.17 (HH) 0.02 - 0.05 NG/ML       All Micro Results     Procedure Component Value Units Date/Time    CULTURE, RESPIRATORY/SPUTUM/BRONCH Audrey Fernandez [[id number]] Collected:  07/24/18 2230    Order Status:  Completed Specimen:  Sputum from Sputum Updated:  07/25/18 0850     Special Requests: NO SPECIAL REQUESTS        GRAM STAIN PENDING     Culture result:         NO GROWTH AFTER SHORT PERIOD OF INCUBATION. FURTHER RESULTS TO FOLLOW AFTER OVERNIGHT INCUBATION.     CULTURE, BLOOD [[id number]] Collected:  07/23/18 0810    Order Status:  Completed Specimen:  Blood Updated:  07/25/18 0620     Special Requests: LEFT ANTECUBITAL        Culture result: NO GROWTH 2 DAYS       CULTURE, BLOOD [[id number]] Collected:  07/23/18 0810    Order Status:  Completed Specimen:  Blood Updated:  07/25/18 0620     Special Requests: --        LEFT  HAND       Culture result: NO GROWTH 2 DAYS       CULTURE, BLOOD, PAIRED [[id number]] Collected:  18 0334    Order Status:  Canceled Specimen:  Blood           Imaging Estee Amaya /Studies:    CXR Results  (Last 48 hours)               18 0629  XR CHEST PORT Final result    Impression:  IMPRESSION: Small focus of left lower lobe atelectasis or pneumonia. Narrative:  EXAM:  XR CHEST PORT       INDICATION:  sob       COMPARISON:  10/11/2017       FINDINGS: A portable AP radiograph of the chest was obtained at 0607 hours. The   patient is on a cardiac monitor. Left lower lobe airspace disease. .  The   cardiac and mediastinal contours and pulmonary vascularity are normal.  The   bones and soft tissues are grossly within normal limits. Results for orders placed or performed during the hospital encounter of 18   2D ECHO COMPLETE ADULT (TTE) W OR WO CONTR    Narrative    Brenda Thomas [address] [address] [facility]  [phone]    Transthoracic Echocardiogram  2D, M-mode, Doppler, and Color Doppler    Patient: Vadim Mcdonough  MR #: [id number]  : 1940  Age: 66 years  Gender: Female  Study date: 2018  Account #: [de-identified]  Height: 60.6 in  Weight: 163.7 lb  BSA: 1.73 mï¾²  Status:Routine  Location: 829  BP: 124/ 84    Allergies: OTHER FOOD, ESOMEPRAZOLE MAGNESIUM, IODINATED CONTRAST- ORAL AND   IV  DYE, IODINE AND IODIDE CONTAINING PRODUCTS, SHELLFISH DERIVED, AMITRIPTYLINE,  AMLODIPINE, AMOXICILLIN, OLMESARTAN, NEBIVOLOL, FLECAINIDE,  HYDROCHLOROTHIAZIDE, IBUPROFEN, IODINE, CEPHALEXIN, LEVOFLOXACIN (BULK),  LISINOPRIL, LISINOPRIL-HYDROCHLOROTHIAZIDE, ESOMEPRAZOLE MAGNESIUM,  NITROFURANTOIN, NITROFURANTOIN MACROCRYSTAL, NITROFURANTOIN MACROCRYSTALLINE,  SULFA (SULFONAMIDE ANTIBIOTICS)    Sonographer:  Makenna Pepe [facility]  Group:  [facility]  Referring Physician:  Fanny Reynolds. MD Juan C [facility]  Reading Physician:  BARRY Dias Cera, MD [facility]    INDICATIONS: Dyspnea, Elevated Troponin. PROCEDURE: This was a routine study. A transthoracic echocardiogram was  performed. The study included complete 2D imaging, M-mode, complete spectral  Doppler, and color Doppler. Intravenous contrast (Definity) was administered. Image quality was adequate. LEFT VENTRICLE: Size was normal. Systolic function was normal. Ejection  fraction was estimated in the range of 55 % to 60 %. There were no regional  wall motion abnormalities. Wall thickness was normal. The E/e' ratio was   8. 15. Diastolic function was indeterminate. The study was not technically   sufficient  to allow evaluation of LV diastolic function. RIGHT VENTRICLE: The size was normal. Systolic function was normal. The  tricuspid jet envelope definition was inadequate for estimation of RV   systolic  pressure. LEFT ATRIUM: The atrium was mildly dilated. RIGHT ATRIUM: The atrium was mildly dilated. SYSTEMIC VEINS: IVC: The inferior vena cava was normal in size and course. AORTIC VALVE: The valve was trileaflet. Leaflets exhibited mild sclerosis. There was no evidence for stenosis. There was no insufficiency. MITRAL VALVE: Valve structure was normal. There was no evidence for stenosis. There was mild regurgitation. TRICUSPID VALVE: The valve structure was normal. There was no evidence for  stenosis. There was mild regurgitation. PULMONIC VALVE: The valve structure was normal. There was no evidence for  stenosis. There was mild regurgitation. PERICARDIUM: There was no pericardial effusion. AORTA: The root exhibited normal size. SUMMARY:    -  Left ventricle: Systolic function was normal. Ejection fraction was  estimated in the range of 55 % to 60 %. There were no regional wall motion  abnormalities. -  Left atrium: The atrium was mildly dilated. -  Right atrium: The atrium was mildly dilated. -  Aortic valve: The valve was trileaflet. Leaflets exhibited mild sclerosis. -  Mitral valve:  There was mild regurgitation.    -  Tricuspid valve: There was mild regurgitation. SYSTEM MEASUREMENT TABLES    2D mode  AoR Diam (2D): 2.8 cm  LA Dimension (2D): 4.1 cm  Left Atrium Systolic Volume Index; Method of Disks, Biplane; 2D mode;: 32.7  ml/m2  IVS/LVPW (2D): 3.1  IVSd (2D): 0.9 cm  LVIDd (2D): 4.5 cm  LVIDs (2D): 3.2 cm  LVOT Area (2D): 3.1 cm2  LVPWd (2D): 1 cm  RVIDd (2D): 3.4 cm    Tissue Doppler Imaging  LV Peak Early Guerrero Tissue Refugio; Lateral MA (TDI): 9 cm/s  LV Peak Early Guerrero Tissue Refugio; Medial MA (TDI): 6.9 cm/s    Unspecified Scan Mode  Peak Grad; Mean; Antegrade Flow: 9 mm[Hg]  Vmax; Antegrade Flow: 151 cm/s  LVOT Diam: 2 cm  MV Peak Refugio/LV Peak Tissue Refugio E-Wave; Lateral MA: 7.1  MV Peak Refugio/LV Peak Tissue Refugio E-Wave; Medial MA: 9.2    Prepared and signed by    BARRY Reed MD [facility]  Signed 24-Jul-2018 13:49:12         Labs and Studies from previous 24 hours have been personally reviewed by myself [address] as of 7/25/2018  Date Reviewed: 7/18/2018          Codes Class Noted - Resolved POA    * (Principal)Left lower lobe pneumonia ([facility] [address] ICD-10-CM: J18.1  ICD-9-CM: 258  7/23/2018 - Present Yes        Acute conjunctivitis ICD-10-CM: H10.30  ICD-9-CM: 372.00  7/23/2018 - Present Yes        Pneumonia ICD-10-CM: J18.9  ICD-9-CM: 367  7/23/2018 - Present         Elevated troponin ICD-10-CM: R74.8  ICD-9-CM: 790.6  7/23/2018 - Present Yes        Acquired hypothyroidism (Chronic) ICD-10-CM: E03.9  ICD-9-CM: 244.9  11/10/2015 - Present Yes        Pulmonary hypertension ([facility] [address] ICD-10-CM: I27.20  ICD-9-CM: 416.8  6/15/2015 - Present Yes    Overview Addendum 11/10/2015  8:52 AM by Jose Daniel Driver MD     Pulmonary Hypertension: had heart cath 4/16/15. At that time her mPAP was only 22, though her systolic PAP was elevated in the 50's and her PVR was elevated at 6. The low mPAP at that time is not conistent with the current TTE findings of RVSP of 110.    She has an autoimmune process that according to her has not been labelled as a specific entity by her rheumatologists in the past.   [address] with vaso testing:  BASELINE MEASUREMENTS:  Pulmonary artery 70/30, mean of 40. Pulmonary artery saturation 70%. Cardiac output 3.2 L/min. Cardiac index 1.8 L/min per meter squared. Adenosine 10 mcg/kg per minute: Pulmonary artery 65/30, mean 40. Cardiac  output 3.1 L/min. Cardiac index 1.8 L/min. Pulmonary artery saturation  74%. Adenosine 30 mcg/kg per minute: Pulmonary artery 50/28, mean 38. Cardiac output 3.4 L/min. Cardiac index 2.0 L/min per meter squared. Pulmonary artery saturation 76%. Adenosine 50 mcg/kg per minute: Pulmonary artery 48/22, mean pulmonary  artery 37. Cardiac output 3.8 L/min. Cardiac index 2.15 L/min per meter squared. Pulmonary artery 77%. Pulmonary capillary wedge pressure is 5-6 mmHg. Mean transpulmonary gradient at baseline 35, without significant change  at peak adenosine. Non resposive PAHTN ( MPAP should decrease by at least 10 and to below 40 mmHg)  CaCb likely not beneficial.             Hyperlipidemia (Chronic) ICD-10-CM: E78.5  ICD-9-CM: 272.4  4/9/2014 - Present Yes        Hypertension (Chronic) ICD-10-CM: I10  ICD-9-CM: 401.9  4/9/2014 - Present Yes        RESOLVED: Cardiomyopathy, dilated (HCC) (Chronic) ICD-10-CM: I42.0  ICD-9-CM: 425.4  11/14/2017 - 7/24/2018 Yes        RESOLVED: CAD (coronary artery disease) (Chronic) ICD-10-CM: I25.10  ICD-9-CM: 414.00  4/9/2014 - 7/24/2018 Yes    Overview Addendum 6/11/2015  4:42 PM by ANPUAMA Brennan     Moderate CAD 4/15                   Plan:  LLL Pneumonia:   - on Aztreonam/ Doxyccyline .  Was in hospital 1 month prior.  - follow BC 2/2 negative at 2 days  -  sputum cultures pending  - Mucinex  - Procalcitonin:0.2    Abdominal pain:  - check US abdomen to r/o acute cholecytitis    Chronic dCHF:  - continue current management  - TTE with EF:50-65%  - daily weight  - strict I/O    Elevated Trop I: - improving  - no SOB, CP  - TTE: EF:50-65%  - hx of L and R cath on 3/2018  - likely due to mismatch because of infection. HTN:  - BP well control  - continue current management      HLP:  - not on statins  - Cholesterol:131m /LDL:60, HDL:59    Acute conjunctivitis:  -on  Gentamycin  - improving    Hypothyroidism:   - on Levothyroxine  - stable     Debility:  -PT/CM      DVT Prophylaxis: Heparin SQ  CODE Status: DNR  Plan of Care Discussed with: patient and  at bedside.  Care team.  Medical Risk: high  Disposition: pending    Yajaira Beth MD  07/25/18

## 2018-07-25 NOTE — PROGRESS NOTES
Problem: Mobility Impaired (Adult and Pediatric)  Goal: *Acute Goals and Plan of Care (Insert Text)  Discharge Goals:  (1.)Ms. Rivera will transfer from bed to chair and chair to bed with MODIFIED INDEPENDENT using the least restrictive device within 5 treatment day(s). (2.)Ms. Rivera will ambulate with MODIFIED INDEPENDENCE for 1000+ feet with the least restrictive device with stable O2 sats within 5 treatment day(s). (3.)Ms. Rivera will tolerate 25+ minutes of therapeutic excise/activity with stable O2 sats within 5 treatment days. ________________________________________________________________________________________________      PHYSICAL THERAPY: Initial Assessment, Treatment Day: Day of Assessment, AM 7/25/2018  INPATIENT: Hospital Day: 3  [place] / Product Type: Medicare /      NAME/AGE/GENDER: Darwin Sears is a 66 y.o. female   PRIMARY DIAGNOSIS: Pneumonia Left lower lobe pneumonia ([address] Left lower lobe pneumonia ([address]        ICD-10: Treatment Diagnosis:    · Difficulty in walking, Not elsewhere classified (R26.2)   Precaution/Allergies: Other food; Esomeprazole magnesium; Iodinated contrast- oral and iv dye; Iodine and iodide containing products; Shellfish derived; Amitriptyline; Amlodipine; Amoxicillin; Benicar [olmesartan]; Bystolic [nebivolol]; Flecainide; Hydrochlorothiazide; Ibuprofen; Iodine; Keflex [cephalexin]; Levofloxacin (bulk); Lisinopril; Lisinopril-hydrochlorothiazide; Nexium [esomeprazole magnesium]; Nitrofurantoin; Nitrofurantoin macrocrystal; Nitrofurantoin macrocrystalline; and Sulfa (sulfonamide antibiotics)      ASSESSMENT:     Ms. Babs Zamora is a pleasant 66 y.o. Female with primary diagnosis of pneumonia. Pt is supine in bed with friend at bedside, A&O x 4, and agreeable to PT Evaluation. Pt states she lives with her friend \"Jeremias\" who is her helper (currently at bedside) in single story home with no steps to enter.  Pt states Sal Hogan helps her around the house with household chores and driving. Pt states she is independent with ADLs and ambulates outside and in community with a rollator, though she does not use an AD for ambulation around the house. Pt states she is on 2.5L supplemental O2 at home, and states she really only needs it when she ambulates. Pt states she had one fall last fall when she walked out into her garden without her oxygen, bent to  the hose and stood up quickly, causing her to collapse and hit her head. Pt reports no pain currently. Pt is currently on room air, with O2 sats of 93%, so pt replaced NC with 2.5L O2. Pt transferred to sitting EOB independently, demonstrating good sitting balance. B LE AROM and strength are WFL. Pt performed STS with SBA, demonstrating good static standing balance, and ambulated 250ft in hallway with SBA and RW, demonstrating narrow JER and slow gait speed but otherwise steady. Pt's O2 sats were 95% after 250ft, so pt ambulated additional 250ft back to bedside. Pt O2 sats were 81% according to pulse ox, so educated pt on deep breathing techniques and pacing, with O2 sats increasing to 96% within 20 seconds. Pt did not appear SOB, but reported that she felt pretty tired. Pt left sitting EOB with all needs met and within reach with Sal Hogan at bedside and nursing notified. Pt appears to be operating below her baseline, and will benefit from skilled therapy for duration of hospital stay to address decreased activity tolerance. Pt may benefit from skilled therapy upon discharge from hospital.    This section established at most recent assessment   PROBLEM LIST (Impairments causing functional limitations):  1. Decreased Activity Tolerance  2. Decreased Pacing Skills  3. Increased Fatigue  4. Decreased [place] with Home Exercise Program   INTERVENTIONS PLANNED: (Benefits and precautions of physical therapy have been discussed with the patient.)  1. Balance Exercise  2.  Bed Mobility  3. Family Education  4. Gait Training  5. Home Exercise Program (HEP)  6. Manual Therapy  7. Neuromuscular Re-education/Strengthening  8. Therapeutic Activites  9. Therapeutic Exercise/Strengthening  10. Transfer Training  11. Group Therapy     TREATMENT PLAN: Frequency/Duration: 3 times a week for duration of hospital stay  Rehabilitation Potential For Stated Goals: Good     RECOMMENDED REHABILITATION/EQUIPMENT: (at time of discharge pending progress): Due to the probability of continued deficits (see above) this patient will likely need continued skilled physical therapy after discharge. Equipment:    None at this time              HISTORY:   History of Present Injury/Illness (Reason for Referral):  Raynelle Boast is a 66 y.o. female who came to ER due to cough and shortness of breath.      Patient has history of dilated cardiomyopathy, chronic congestive heart failure, pulmonary hypertension, CAD (however reportedly last cardiac cath 3 months ago was \"normal\"), hypertension, hyperlipidemia, hypothyroidism, and other medical problems as mentioned below.      She has been coughing for the past 4 days. Also, producing phlegm, thick sputum. She denies hemoptysis. No fever. No chest pain. Some shortness of breath especially with cough.      She notices that her right eye is red with some discharge this morning. Her vision is fine.      She denies abdominal pain, no blood per rectum or urine. She denies changes in her medications recently. No shaking. No chills. She has not been taking antibiotics recently. No immediate sick contacts. Past Medical History/Comorbidities:   Ms. Neftaly Martel  has a past medical history of Acute on chronic combined systolic and diastolic heart failure ([facility] (6/11/2015); Acute renal failure (ARF) ([address] (6/13/2015); Adiposity (11/10/2015); Arthritis (4/9/2014); Bilateral carotid artery stenosis; CAD (coronary artery disease) (4/9/2014);  Chronic kidney disease; Coagulation disorder ([facility] Congestive heart failure (CHF) ([facility] Dyspnea (6/11/2015); Dyspnea on exertion (4/9/2014); GERD (gastroesophageal reflux disease); History of ITP; Hyperlipidemia (4/9/2014); Hypertension (4/9/2014); Hyponatremia (6/11/2015); Hypothyroid; Hypothyroidism (6/11/2015); Hypoxemia (7/16/2014); Left atrial dilation; Liver disease; Lung nodule (4/9/2014); MI (myocardial infarction) ([facility] Nausea and vomiting (6/13/2016); Other ill-defined conditions(799.89); Pulmonary fibrosis ([facility] (4/9/2014); Pulmonary hypertension ([facility] Pulmonary valve regurgitation; PVC (premature ventricular contraction) (4/25/2014); Raynaud's disease (4/9/2014); Right atrial dilation; Severe tricuspid regurgitation; Sicca syndrome, Sjogren's ([facility] (4/9/2015); and Thrombocytopenia ([facility] (10/12/2015). She also has no past medical history of Adverse effect of anesthesia; Difficult intubation; Malignant hyperthermia due to anesthesia; Pseudocholinesterase deficiency; or Unspecified adverse effect of anesthesia. Ms. Joanie Ngo  has a past surgical history that includes hx hysterectomy; hx appendectomy; hx tonsil and adenoidectomy; hx orthopaedic; hx hernia repair; hx colonoscopy; and pr colonoscopy w/biopsy single/multiple (12/13/2013).   Social History/Living Environment:   Home Environment: Private residence  # Steps to Enter: 0  One/Two Story Residence: One story  Living Alone: No  Support Systems: Friends \ neighbors  Patient Expects to be Discharged to[de-identified] Private residence  Current DME Used/Available at Home: Oxygen, portable, Walker, rollator, Cane, straight  Prior Level of Function/Work/Activity:  Independent with ADLs, ambulates household distances with no AD and community distances with rollator, occasionally drives, one fall   Number of Personal Factors/Comorbidities that affect the Plan of Care: 3+: HIGH COMPLEXITY   EXAMINATION:   Most Recent Physical Functioning:   Gross Assessment:  AROM: Within functional limits  Strength: Within functional limits  Coordination: Within functional limits  Tone: Normal               Posture:     Balance:  Sitting: Intact  Standing: Impaired  Standing - Static: Good  Standing - Dynamic : Fair Bed Mobility:  Rolling: Independent  Supine to Sit: Independent  Scooting: Independent  Wheelchair Mobility:     Transfers:  Sit to Stand: Supervision  Stand to Sit: Supervision  Gait:     Base of Support: Narrowed  Speed/Erin: Slow  Gait Abnormalities: Decreased step clearance  Distance (ft): 500 Feet (ft)  Assistive Device: Walker, rolling  Ambulation - Level of Assistance: Stand-by assistance      Body Structures Involved:  1. Heart  2. Lungs  3. Bones  4. Joints  5. Muscles  6. Ligaments Body Functions Affected:  1. Cardio  2. Respiratory  3. Neuromusculoskeletal  4. Movement Related Activities and Participation Affected:  1. Mobility  2. Domestic Life  3. Interpersonal Interactions and Relationships  4. Community, Social and [place]   Number of elements that affect the Plan of Care: 4+: HIGH COMPLEXITY   CLINICAL PRESENTATION:   Presentation: Stable and uncomplicated: LOW COMPLEXITY   CLINICAL DECISION MAKIN [facility] Short Form  How much difficulty does the patient currently have. .. Unable A Lot A Little None   1. Turning over in bed (including adjusting bedclothes, sheets and blankets)? [] 1   [] 2   [] 3   [x] 4   2. Sitting down on and standing up from a chair with arms ( e.g., wheelchair, bedside commode, etc.)   [] 1   [] 2   [] 3   [x] 4   3. Moving from lying on back to sitting on the side of the bed? [] 1   [] 2   [] 3   [x] 4   How much help from another person does the patient currently need. .. Total A Lot A Little None   4. Moving to and from a bed to a chair (including a wheelchair)? [] 1   [] 2   [x] 3   [] 4   5. Need to walk in hospital room? [] 1   [] 2   [x] 3   [] 4   6. Climbing 3-5 steps with a railing?    [] 1 [x] 2   [] 3   [] 4   © 2007, Trustees of [facility] [employer], under license to [employer]. All rights reserved      Score:  Initial: 20 Most Recent: X (Date: -- )    Interpretation of Tool:  Represents activities that are increasingly more difficult (i.e. Bed mobility, Transfers, Gait). Score 24 23 22-20 19-15 14-10 9-7 6     Modifier CH CI CJ CK CL CM CN      ? Mobility - Walking and Moving Around:     - CURRENT STATUS: CJ - 20%-39% impaired, limited or restricted    - GOAL STATUS: CI - 1%-19% impaired, limited or restricted    - D/C STATUS:  ---------------To be determined---------------  [place] / [place] / Product Type: Medicare /      Medical Necessity:     · Patient demonstrates good rehab potential due to higher previous functional level. Reason for Services/Other Comments:  · Patient continues to require skilled intervention due to impaired activity tolerance. Use of outcome tool(s) and clinical judgement create a POC that gives a: Clear prediction of patient's progress: LOW COMPLEXITY            TREATMENT:   (In addition to Assessment/Re-Assessment sessions the following treatments were rendered)   Pre-treatment Symptoms/Complaints:  \"I'm feeling ok\"  Pain: Initial:   Pain Intensity 1: 0  Post Session:  0/10   In addition to PT Evaluation:  Therapeutic Activity: (    8 minutes): Therapeutic activities including Bed transfers, Ambulation on level ground and education on deep breathing techniques, pacing, and safety awareness to improve mobility, strength, balance and coordination. Required minimal  verbal cuing to promote dynamic balance in standing.      Braces/Orthotics/Lines/Etc:   · O2 Device: Nasal cannula  Treatment/Session Assessment:    · Response to Treatment:  Ambulated 500ft with RW and SBA and 3L O2, 81-96% O2 sats  · Interdisciplinary Collaboration:   o Physical Therapist  o Registered Nurse  · After treatment position/precautions: o Bed/Chair-wheels locked  o Bed in low position  o Call light within reach  o RN notified  o Visitors at bedside  o sitting EOB   · Compliance with Program/Exercises: Will assess as treatment progresses. · Recommendations/Intent for next treatment session: \"Next visit will focus on advancements to more challenging activities\".   Total Treatment Duration:  PT Patient Time In/Time Out  Time In: 1120  Time Out: [address] [facility],

## 2018-07-25 NOTE — PROGRESS NOTES
[facility] CARDIOLOGY PROGRESS NOTE 
      
 
7/25/2018 8:19 AM 
 
Admit Date: 7/23/2018 Subjective:  
PAtient denies any chest pain. Complains of cough at night and inability to sleep. Echo yesterday with normal LV function. BP and heart rate controlled. ROS: 
Cardiovascular:  As noted above Objective:  
  
Vitals:  
 07/24/18 2008 07/25/18 0000 07/25/18 0335 07/25/18 9374 BP: 127/85 115/75 121/84 126/84 Pulse: 83 79 86 72 Resp: 22 22 20 17 Temp: 97.7 °F (36.5 °C) 97.9 °F (36.6 °C) 98.8 °F (37.1 °C) 97.7 °F (36.5 °C) SpO2: 96% 92% 95% 100% Weight:   76.8 kg (169 lb 6.4 oz) Height:      
 
 
Physical Exam: 
General-No Acute Distress Neck- supple, no JVD 
CV- regular rate and rhythm no MRG Lung- clear bilaterally Abd- soft, nontender, nondistended Ext- no edema bilaterally. Skin- warm and dry Data Review:  
Recent Labs  
   07/25/18 
 0549  07/24/18 
 6301 NA  135*  135* K  3.8  3.9 BUN  9  11 CREA  0.66  0.77 GLU  96  91 WBC  7.1  8.3 HGB  11.7  11.9 HCT  35.4*  36.0 PLT  262  220 TRIGL  59   --   
HDL  59   --   
 
  
Lab Results Component Value Date/Time TROIQ 0.17 () 07/25/2018 05:49 AM  
 TROIQ 0.28 ([facility]) 07/24/2018 05:08 PM  
 
 
Assessment/Plan:  
 
Principal Problem: 
  Community acquired bacterial pneumonia (7/23/2018) Antibiotics per primary team.   
 
Active Problems: Hypertension (4/9/2014) BP controlled. Pulmonary hypertension ([address] (6/15/2015) Assess with echo today. Acquired hypothyroidism (11/10/2015) On replacement. Pneumonia (7/23/2018) See above. Elevated troponin (7/23/2018) Elevated troponin due to supply/demand mismatch in setting of pneumonia. Echo normal.  Continue Cardizem. No further CV recs. Medical therapy. Will sign off. Call with questions.   
 
 
 
 
 
 
 
 
Shameka Hardin MD 
7/25/2018 8:19 AM

## 2018-07-25 NOTE — PROGRESS NOTES
Patient slept well during the night, no c/o pain or discomfort this a.m, remains on 2L n/c, denies SOB, call light within reach.

## 2018-07-26 ENCOUNTER — APPOINTMENT (OUTPATIENT)
Dept: ULTRASOUND IMAGING | Age: 78
DRG: 194 | End: 2018-07-26
Attending: HOSPITALIST
Payer: MEDICARE

## 2018-07-26 ENCOUNTER — HOME HEALTH ADMISSION (OUTPATIENT)
Dept: HOME HEALTH SERVICES | Facility: HOME HEALTH | Age: 78
End: 2018-07-26
Payer: MEDICARE

## 2018-07-26 LAB
ALBUMIN SERPL-MCNC: 2.7 G/DL (ref 3.2–4.6)
ALBUMIN/GLOB SERPL: 0.7 {RATIO} (ref 1.2–3.5)
ALP SERPL-CCNC: 71 U/L (ref 50–136)
ALT SERPL-CCNC: 18 U/L (ref 12–65)
ANION GAP SERPL CALC-SCNC: 7 MMOL/L (ref 7–16)
AST SERPL-CCNC: 18 U/L (ref 15–37)
BILIRUB SERPL-MCNC: 0.4 MG/DL (ref 0.2–1.1)
BUN SERPL-MCNC: 9 MG/DL (ref 8–23)
CALCIUM SERPL-MCNC: 9.3 MG/DL (ref 8.3–10.4)
CHLORIDE SERPL-SCNC: 100 MMOL/L (ref 98–107)
CO2 SERPL-SCNC: 29 MMOL/L (ref 21–32)
CREAT SERPL-MCNC: 0.73 MG/DL (ref 0.6–1)
ERYTHROCYTE [DISTWIDTH] IN BLOOD BY AUTOMATED COUNT: 13.9 % (ref 11.9–14.6)
GLOBULIN SER CALC-MCNC: 3.8 G/DL (ref 2.3–3.5)
GLUCOSE SERPL-MCNC: 93 MG/DL (ref 65–100)
HCT VFR BLD AUTO: 34.8 % (ref 35.8–46.3)
HGB BLD-MCNC: 11.5 G/DL (ref 11.7–15.4)
MCH RBC QN AUTO: 31 PG (ref 26.1–32.9)
MCHC RBC AUTO-ENTMCNC: 33 G/DL (ref 31.4–35)
MCV RBC AUTO: 93.8 FL (ref 79.6–97.8)
PLATELET # BLD AUTO: 268 K/UL (ref 150–450)
PMV BLD AUTO: 9.2 FL (ref 10.8–14.1)
POTASSIUM SERPL-SCNC: 4 MMOL/L (ref 3.5–5.1)
PROT SERPL-MCNC: 6.5 G/DL (ref 6.3–8.2)
RBC # BLD AUTO: 3.71 M/UL (ref 4.05–5.25)
SODIUM SERPL-SCNC: 136 MMOL/L (ref 136–145)
WBC # BLD AUTO: 7 K/UL (ref 4.3–11.1)

## 2018-07-26 PROCEDURE — 74011000250 HC RX REV CODE- 250: Performed by: HOSPITALIST

## 2018-07-26 PROCEDURE — 74011000258 HC RX REV CODE- 258: Performed by: HOSPITALIST

## 2018-07-26 PROCEDURE — 74011250637 HC RX REV CODE- 250/637: Performed by: INTERNAL MEDICINE

## 2018-07-26 PROCEDURE — 76705 ECHO EXAM OF ABDOMEN: CPT

## 2018-07-26 PROCEDURE — 74011250637 HC RX REV CODE- 250/637: Performed by: EMERGENCY MEDICINE

## 2018-07-26 PROCEDURE — 65660000000 HC RM CCU STEPDOWN

## 2018-07-26 PROCEDURE — 80053 COMPREHEN METABOLIC PANEL: CPT | Performed by: HOSPITALIST

## 2018-07-26 PROCEDURE — 36415 COLL VENOUS BLD VENIPUNCTURE: CPT | Performed by: HOSPITALIST

## 2018-07-26 PROCEDURE — 85027 COMPLETE CBC AUTOMATED: CPT | Performed by: HOSPITALIST

## 2018-07-26 PROCEDURE — 74011250637 HC RX REV CODE- 250/637: Performed by: HOSPITALIST

## 2018-07-26 RX ORDER — CEFPODOXIME PROXETIL 200 MG/1
200 TABLET, FILM COATED ORAL EVERY 12 HOURS
Status: DISCONTINUED | OUTPATIENT
Start: 2018-07-26 | End: 2018-07-27 | Stop reason: HOSPADM

## 2018-07-26 RX ORDER — DIPHENHYDRAMINE HYDROCHLORIDE 50 MG/ML
25 INJECTION, SOLUTION INTRAMUSCULAR; INTRAVENOUS
Status: DISCONTINUED | OUTPATIENT
Start: 2018-07-26 | End: 2018-07-27 | Stop reason: HOSPADM

## 2018-07-26 RX ADMIN — Medication 400 MG: at 11:40

## 2018-07-26 RX ADMIN — ZOLPIDEM TARTRATE 5 MG: 5 TABLET ORAL at 21:44

## 2018-07-26 RX ADMIN — LEVOTHYROXINE SODIUM 112 MCG: 112 TABLET ORAL at 05:52

## 2018-07-26 RX ADMIN — DILTIAZEM HYDROCHLORIDE 120 MG: 120 CAPSULE, COATED, EXTENDED RELEASE ORAL at 11:40

## 2018-07-26 RX ADMIN — VITAMIN D, TAB 1000IU (100/BT) 2000 UNITS: 25 TAB at 11:39

## 2018-07-26 RX ADMIN — Medication 400 MG: at 17:23

## 2018-07-26 RX ADMIN — SPIRONOLACTONE 12.5 MG: 25 TABLET, FILM COATED ORAL at 05:52

## 2018-07-26 RX ADMIN — CEFPODOXIME PROXETIL 200 MG: 200 TABLET, FILM COATED ORAL at 21:44

## 2018-07-26 RX ADMIN — FAMOTIDINE 20 MG: 20 TABLET ORAL at 17:23

## 2018-07-26 RX ADMIN — FUROSEMIDE 20 MG: 40 TABLET ORAL at 11:40

## 2018-07-26 RX ADMIN — DOXYCYCLINE HYCLATE 100 MG: 100 CAPSULE ORAL at 11:39

## 2018-07-26 RX ADMIN — AZTREONAM 1 G: 1 INJECTION, POWDER, LYOPHILIZED, FOR SOLUTION INTRAMUSCULAR; INTRAVENOUS at 08:17

## 2018-07-26 RX ADMIN — Medication 10 ML: at 05:51

## 2018-07-26 RX ADMIN — Medication 5 ML: at 14:57

## 2018-07-26 RX ADMIN — GENTAMICIN SULFATE 1 DROP: 3 SOLUTION OPHTHALMIC at 17:24

## 2018-07-26 RX ADMIN — GENTAMICIN SULFATE 1 DROP: 3 SOLUTION OPHTHALMIC at 11:41

## 2018-07-26 RX ADMIN — GUAIFENESIN 1200 MG: 600 TABLET, EXTENDED RELEASE ORAL at 17:23

## 2018-07-26 RX ADMIN — FAMOTIDINE 20 MG: 20 TABLET ORAL at 11:40

## 2018-07-26 RX ADMIN — GUAIFENESIN 1200 MG: 600 TABLET, EXTENDED RELEASE ORAL at 11:39

## 2018-07-26 RX ADMIN — BENZONATATE 100 MG: 100 CAPSULE ORAL at 21:45

## 2018-07-26 RX ADMIN — Medication 10 ML: at 21:45

## 2018-07-26 RX ADMIN — AZTREONAM 1 G: 1 INJECTION, POWDER, LYOPHILIZED, FOR SOLUTION INTRAMUSCULAR; INTRAVENOUS at 00:52

## 2018-07-26 RX ADMIN — TADALAFIL 40 MG: 20 TABLET ORAL at 11:41

## 2018-07-26 RX ADMIN — LOPERAMIDE HYDROCHLORIDE 2 MG: 2 CAPSULE ORAL at 21:45

## 2018-07-26 RX ADMIN — GENTAMICIN SULFATE 1 DROP: 3 SOLUTION OPHTHALMIC at 21:50

## 2018-07-26 RX ADMIN — CEFPODOXIME PROXETIL 200 MG: 200 TABLET, FILM COATED ORAL at 14:56

## 2018-07-26 RX ADMIN — HYDROCODONE BITARTRATE AND ACETAMINOPHEN 1 TABLET: 5; 325 TABLET ORAL at 21:45

## 2018-07-26 RX ADMIN — DOXYCYCLINE HYCLATE 100 MG: 100 CAPSULE ORAL at 21:45

## 2018-07-26 NOTE — PROGRESS NOTES
[facility]  Face to Face Encounter    Patients Name: Reginald Pfeifefr    YOB: 1940    Primary Diagnosis: bacterial pneumonia    Date of Face to Face:  7/26/18                                   Face to Face Encounter findings are related to primary reason for home care:   yes    1. I certify that the patient needs intermittent skilled nursing care, physical therapy and/or speech therapy. I will not be following this patient in the Community and Dr. Lobito Lara will be responsible for signing the [address] 2. Initial Orders for Care: Must be completed only if Face to Face MD will not be signing the [address] Skilled Nursing and Physical Therapy    3. I certify that this patient is homebound for the following reason(s): Requires considerable and taxing effort to leave the home     4. I certify that this patient is under my care and that I, or a nurse practitioner or [facility] working with me, had a Face-to-Face Encounter that meets the physician Face-to-Face Encounter requirements. Document the physical findings from the Face-to-Face Encounter that support the need for skilled services: Has new diagnosis that requires skilled nursing teaching and intervention     Brigido Almonte Him  7/26/2018

## 2018-07-26 NOTE — PROGRESS NOTES
Care Management Interventions  PCP Verified by CM: Yes  Last Visit to PCP: 07/06/18  Transition of Care Consult (CM Consult): [address]: Yes  Physical Therapy Consult: Yes  Current Support Network: Other  Confirm Follow Up Transport: Self  Plan discussed with Pt/Family/Caregiver: Yes  Freedom of Choice Offered: Yes  Discharge Location  Discharge Placement: Home with home health  Patient is alert and oriented in all spheres. Lives with her fiance. Independent with ambulation and ADLs. Uses home 02 continuous ([facility]). Drives. Drives. [facility] RN/PT. [facility] [place] liaison notified.

## 2018-07-26 NOTE — PROGRESS NOTES
Patient given prn ambien for sleep, prn norco 5mg for generalized pain and discomfort, and prn tessalon r/t continuous dry cough, meds tolerated well.

## 2018-07-26 NOTE — PROGRESS NOTES
Patient is alert and oriented X4 up and ambulatory in room on 2L n/c, family @ bedside, patient denies SOB, c/o persistent dry cough, denies needs @ this time, call light within reach.

## 2018-07-26 NOTE — PROGRESS NOTES
Hospitalist Progress Note    2018  Admit Date: 2018  6:05 AM   NAME: Lisa Nye   :  1940   DOS:              18  MRN:  [id number]   Attending: Raymond Barahona MD  PCP:  Liv Lee MD  Treatment Team: Attending Provider: Vaughan Dubin, MD; Utilization Review: Oswaldo Parnell RN; Transitional Nurse Navigator: Chica Koenig RN    DNR     SUBJECTIVE:   As previously documented: \"Ms. Ferny Rand is a  67 yo F with PMHx of  Mild CAD (L and R cath  On 3/9/018), dilated cardiomyopathy, chronic congestive heart failure, pulmonary hypertension,  hypertension, hyperlipidemia, hypothyroidism, chronic respiratory failure on NC 2.5L who was admitted on  for cough and shortness of breath thought to be due to LLL pneumonia. Started on . doxycyline due to multiple abx allergies. Was admitted in [place] 1 month prior for acute exacerbation of CHF. TTE with EF:55-60%. \"           18   Ms. Andreia Rivrea  States that her breathing status and cough is improving., afebrile. Abdominal pain resolved. Sputum culture with normal juan pablo and BC 2/2 negative. Allergy to Keflex - rash, although able to tolerate Ancef in the past.        10+ ROS reviewed and negative except for positive in HPI. Allergies   Allergen Reactions    Other Food Anaphylaxis     shellfish    Esomeprazole Magnesium Rash    Iodinated Contrast- Oral And Iv Dye Swelling and Anaphylaxis     IVP Dye  Had tongue and throat swelling 25 years ago (). Pt has had steroids and benadryl with CT scan dye since and did fine.     Iodine And Iodide Containing Products Anaphylaxis    Shellfish Derived Anaphylaxis    Amitriptyline Hives    Amlodipine Hives    Amoxicillin Other (comments) and Hives    Benicar [Olmesartan] Hives    Bystolic [Nebivolol] Hives    Flecainide Rash    Hydrochlorothiazide Hives    Ibuprofen Hives    Iodine Unknown (comments)    Keflex [Cephalexin] Rash and Hives    Levofloxacin (Bulk) Hives    Lisinopril Hives    Lisinopril-Hydrochlorothiazide Rash    Nexium [Esomeprazole Magnesium] Hives    Nitrofurantoin Hives    Nitrofurantoin Macrocrystal Rash    Nitrofurantoin Macrocrystalline Other (comments)    Sulfa (Sulfonamide Antibiotics) Hives     Current Facility-Administered Medications   Medication Dose Route Frequency    dilTIAZem CD (CARDIZEM CD) capsule 120 mg  120 mg Oral DAILY    famotidine (PEPCID) tablet 20 mg  20 mg Oral BID    fluticasone (FLONASE) 50 mcg/actuation nasal spray 2 Spray  2 Spray Both Nostrils DAILY    benzonatate (TESSALON) capsule 100 mg  100 mg Oral TID PRN    doxycycline (VIBRAMYCIN) capsule 100 mg  100 mg Oral Q12H    aztreonam (AZACTAM) 1 g in 0.9% sodium chloride (MBP/ADV) 100 mL  1 g IntraVENous Q8H    guaiFENesin ER (MUCINEX) tablet 1,200 mg  1,200 mg Oral BID    sodium chloride (NS) flush 5-10 mL  5-10 mL IntraVENous Q8H    sodium chloride (NS) flush 5-10 mL  5-10 mL IntraVENous PRN    acetaminophen (TYLENOL) tablet 650 mg  650 mg Oral Q4H PRN    naloxone (NARCAN) injection 0.4 mg  0.4 mg IntraVENous PRN    HYDROcodone-acetaminophen (NORCO) 5-325 mg per tablet 1 Tab  1 Tab Oral Q4H PRN    ondansetron (ZOFRAN) injection 4 mg  4 mg IntraVENous Q4H PRN    diphenhydrAMINE (BENADRYL) injection 12.5 mg  12.5 mg IntraVENous Q4H PRN    zolpidem (AMBIEN) tablet 5 mg  5 mg Oral QHS PRN    heparin (porcine) injection 5,000 Units  5,000 Units SubCUTAneous Q8H    levothyroxine (SYNTHROID) tablet 112 mcg  112 mcg Oral ACB    furosemide (LASIX) tablet 20 mg  20 mg Oral DAILY    magnesium oxide (MAG-OX) tablet 400 mg  400 mg Oral BID    cholecalciferol (VITAMIN D3) tablet 2,000 Units  2,000 Units Oral DAILY    spironolactone (ALDACTONE) tablet 12.5 mg  12.5 mg Oral 7am    gentamicin (GARAMYCIN) 0.3 % ophthalmic solution 1 Drop  1 Drop Right Eye QID    sodium chloride (OCEAN) 0.65 % nasal squeeze bottle 2 Spray  2 Spray Both Nostrils Q2H PRN    tadalafil (antihypertensive) (ADCIRCA) tablet 40 mg (Patient Supplied)  40 mg Oral DAILY    loperamide (IMODIUM) capsule 2 mg  2 mg Oral Q6H PRN         Immunization History   Administered Date(s) Administered    Influenza Vaccine 2013, 10/01/2014, 10/02/2015, 10/18/2016    Influenza Vaccine (Quad) Mdck Pf 10/03/2017    Pneumococcal Conjugate (PCV-13) 2016    Pneumococcal Vaccine (Unspecified Type) 2011    Zoster Vaccine, Live 2015     Objective:     Patient Vitals for the past 24 hrs:   Temp Pulse Resp BP SpO2   18 0734 97.9 °F (36.6 °C) 69 18 126/67 99 %   18 0419 97.9 °F (36.6 °C) 76 20 148/81 98 %   18 2300 97.9 °F (36.6 °C) 78 20 114/76 100 %   18 1904 97.9 °F (36.6 °C) 77 19 121/62 100 %   18 1517 97.9 °F (36.6 °C) 77 19 152/84 93 %   18 1159 - - - - 96 %   18 1117 97.7 °F (36.5 °C) 74 18 140/83 97 %     Temp (24hrs), Av.9 °F (36.6 °C), Min:97.7 °F (36.5 °C), Max:97.9 °F (36.6 °C)    Oxygen Therapy  O2 Sat (%): 99 % (18 0734)  Pulse via Oximetry: 80 beats per minute (18 0958)  O2 Device: Nasal cannula (18 1159)  O2 Flow Rate (L/min): 2.5 l/min (18 1159)  Oxygen Therapy  O2 Sat (%): 99 % (18 0734)  Pulse via Oximetry: 80 beats per minute (18 0958)  O2 Device: Nasal cannula (18 1159)  O2 Flow Rate (L/min): 2.5 l/min (18 1159)    Physical Exam:  General:         Alert, cooperative, no acute  Distress. Afebrile. HEENT:               NCAT. No obvious deformity. Nares normal. No drainage  Lungs:               Decreased air entry b/l. No wheezing/rhonchi/rales  RA  Cardiovascular:   RRR. No m/r/g. Trace pitting pedal edema b/l. +2 PT/DT pulses b/l. Abdomen:       Soft, tender in epigastric area. Mild positive Lenz sign. No rebound tenderness.   + BS  Skin:         No rashes or lesions. Not Jaundiced  Neurologic:    AAOx3. No gross focal deficit. Moves all extremities.    Psychiatric: Good mood. Normal affect. Denies any SI/HI. DIAGNOSTIC STUDIES      Data Review:   Recent Results (from the past 24 hour(s))   CBC W/O DIFF    Collection Time: 07/26/18  6:32 AM   Result Value Ref Range    WBC 7.0 4.3 - 11.1 K/uL    RBC 3.71 (L) 4.05 - 5.25 M/uL    HGB 11.5 (L) 11.7 - 15.4 g/dL    HCT 34.8 (L) 35.8 - 46.3 %    MCV 93.8 79.6 - 97.8 FL    MCH 31.0 26.1 - 32.9 PG    MCHC 33.0 31.4 - 35.0 g/dL    RDW 13.9 11.9 - 14.6 %    PLATELET 858 197 - 701 K/uL    MPV 9.2 (L) 10.8 - 94.5 FL   METABOLIC PANEL, COMPREHENSIVE    Collection Time: 07/26/18  6:32 AM   Result Value Ref Range    Sodium 136 136 - 145 mmol/L    Potassium 4.0 3.5 - 5.1 mmol/L    Chloride 100 98 - 107 mmol/L    CO2 29 21 - 32 mmol/L    Anion gap 7 7 - 16 mmol/L    Glucose 93 65 - 100 mg/dL    BUN 9 8 - 23 MG/DL    Creatinine 0.73 0.6 - 1.0 MG/DL    GFR est AA >60 >60 ml/min/1.73m2    GFR est non-AA >60 >60 ml/min/1.73m2    Calcium 9.3 8.3 - 10.4 MG/DL    Bilirubin, total 0.4 0.2 - 1.1 MG/DL    ALT (SGPT) 18 12 - 65 U/L    AST (SGOT) 18 15 - 37 U/L    Alk.  phosphatase 71 50 - 136 U/L    Protein, total 6.5 6.3 - 8.2 g/dL    Albumin 2.7 (L) 3.2 - 4.6 g/dL    Globulin 3.8 (H) 2.3 - 3.5 g/dL    A-G Ratio 0.7 (L) 1.2 - 3.5         All Micro Results     Procedure Component Value Units Date/Time    CULTURE, BLOOD [[id number]] Collected:  07/23/18 0810    Order Status:  Completed Specimen:  Blood Updated:  07/26/18 0909     Special Requests: LEFT ANTECUBITAL        Culture result: NO GROWTH 3 DAYS       CULTURE, BLOOD [[id number]] Collected:  07/23/18 0810    Order Status:  Completed Specimen:  Blood Updated:  07/26/18 0909     Special Requests: --        LEFT  HAND       Culture result: NO GROWTH 3 DAYS       CULTURE, RESPIRATORY/SPUTUM/BRONCH Vallery Peed STAIN [[id number]] Collected:  07/24/18 2230    Order Status:  Completed Specimen:  Sputum from Sputum Updated:  07/26/18 0815     Special Requests: NO SPECIAL REQUESTS        GRAM STAIN 20 TO 45 WBC'S/OIF      0 TO 3 EPITHELIAL CELLS SEEN /OIF              MODERATE GRAM POSITIVE COCCI              MODERATE GRAM NEGATIVE RODS      FEW YEAST         FEW GRAM POSITIVE RODS         3+ MUCUS PRESENT        Culture result:         MODERATE NORMAL RESPIRATORY CAYLA    CULTURE, RESPIRATORY/SPUTUM/BRONCH Lindsey Clark [[id number]]     Order Status:  Canceled Specimen:  Sputum from Sputum     CULTURE, BLOOD, PAIRED [[id number]] Collected:  18 0811    Order Status:  Canceled Specimen:  Blood           Imaging Amy Ivory /Studies:    CXR Results  (Last 48 hours)               18 0629  XR CHEST PORT Final result    Impression:  IMPRESSION: Small focus of left lower lobe atelectasis or pneumonia. Narrative:  EXAM:  XR CHEST PORT       INDICATION:  sob       COMPARISON:  10/11/2017       FINDINGS: A portable AP radiograph of the chest was obtained at 0607 hours. The   patient is on a cardiac monitor. Left lower lobe airspace disease. .  The   cardiac and mediastinal contours and pulmonary vascularity are normal.  The   bones and soft tissues are grossly within normal limits. US abdomen IMPRESSION:   1. No gallstones, or additional findings of the gallbladder to suggest  cholecystitis. If there is continued concern for cholecystitis, then further  evaluation can be considered with a nuclear medicine hepatobiliary scan. 2.  No intra- or extrahepatic biliary ductal dilitation.   3.  No abnormal fluid collection seen.        Results for orders placed or performed during the hospital encounter of 18   2D ECHO COMPLETE ADULT (TTE) W OR [address] [facility]  [address] [facility], [address] [facility]  [phone]    Transthoracic Echocardiogram  2D, M-mode, Doppler, and Color Doppler    Patient: July Sepulveda  MR #: [id number]  : [id number]  Age: 66 years  Gender: Female  Study date: 2018  Account #: [id number]  Height: 60.6 in  Weight: 163.7 lb  BSA: 1.73 mï¾²  Status:Routine  Location: 829  BP: 124/ 84    Allergies: OTHER FOOD, ESOMEPRAZOLE MAGNESIUM, IODINATED CONTRAST- ORAL AND   IV  DYE, IODINE AND IODIDE CONTAINING PRODUCTS, SHELLFISH DERIVED, AMITRIPTYLINE,  AMLODIPINE, AMOXICILLIN, OLMESARTAN, NEBIVOLOL, FLECAINIDE,  HYDROCHLOROTHIAZIDE, IBUPROFEN, IODINE, CEPHALEXIN, LEVOFLOXACIN (BULK),  LISINOPRIL, LISINOPRIL-HYDROCHLOROTHIAZIDE, ESOMEPRAZOLE MAGNESIUM,  NITROFURANTOIN, NITROFURANTOIN MACROCRYSTAL, NITROFURANTOIN MACROCRYSTALLINE,  SULFA (SULFONAMIDE ANTIBIOTICS)    Sonographer:  Narcisa November, [facility]  Group:  [facility]  Referring Physician:  Andrea Irizarry MD [facility] [place]  Reading Physician:  BARRY Oh MD [facility] [place]    INDICATIONS: Dyspnea, Elevated Troponin. PROCEDURE: This was a routine study. A transthoracic echocardiogram was  performed. The study included complete 2D imaging, M-mode, complete spectral  Doppler, and color Doppler. Intravenous contrast (Definity) was administered. Image quality was adequate. LEFT VENTRICLE: Size was normal. Systolic function was normal. Ejection  fraction was estimated in the range of 55 % to 60 %. There were no regional  wall motion abnormalities. Wall thickness was normal. The E/e' ratio was   8. 15. Diastolic function was indeterminate. The study was not technically   sufficient  to allow evaluation of LV diastolic function. RIGHT VENTRICLE: The size was normal. Systolic function was normal. The  tricuspid jet envelope definition was inadequate for estimation of RV   systolic  pressure. LEFT ATRIUM: The atrium was mildly dilated. RIGHT ATRIUM: The atrium was mildly dilated. SYSTEMIC VEINS: IVC: The inferior vena cava was normal in size and course. AORTIC VALVE: The valve was trileaflet. Leaflets exhibited mild sclerosis. There was no evidence for stenosis. There was no insufficiency.     MITRAL VALVE: Valve structure was normal. There was no evidence for stenosis. There was mild regurgitation. TRICUSPID VALVE: The valve structure was normal. There was no evidence for  stenosis. There was mild regurgitation. PULMONIC VALVE: The valve structure was normal. There was no evidence for  stenosis. There was mild regurgitation. PERICARDIUM: There was no pericardial effusion. AORTA: The root exhibited normal size. SUMMARY:    -  Left ventricle: Systolic function was normal. Ejection fraction was  estimated in the range of 55 % to 60 %. There were no regional wall motion  abnormalities. -  Left atrium: The atrium was mildly dilated. -  Right atrium: The atrium was mildly dilated. -  Aortic valve: The valve was trileaflet. Leaflets exhibited mild sclerosis. -  Mitral valve: There was mild regurgitation.    -  Tricuspid valve: There was mild regurgitation. SYSTEM MEASUREMENT TABLES    2D mode  AoR Diam (2D): 2.8 cm  LA Dimension (2D): 4.1 cm  Left Atrium Systolic Volume Index; Method of Disks, Biplane; 2D mode;: 32.7  ml/m2  IVS/LVPW (2D): 3.1  IVSd (2D): 0.9 cm  LVIDd (2D): 4.5 cm  LVIDs (2D): 3.2 cm  LVOT Area (2D): 3.1 cm2  LVPWd (2D): 1 cm  RVIDd (2D): 3.4 cm    Tissue Doppler Imaging  LV Peak Early Guerrero Tissue Refugio; Lateral MA (TDI): 9 cm/s  LV Peak Early Guerrero Tissue Refugio; Medial MA (TDI): 6.9 cm/s    Unspecified Scan Mode  Peak Grad; Mean; Antegrade Flow: 9 mm[Hg]  Vmax; Antegrade Flow: 151 cm/s  LVOT Diam: 2 cm  MV Peak Refugio/LV Peak Tissue Refugio E-Wave; Lateral MA: 7.1  MV Peak Refugio/LV Peak Tissue Refugio E-Wave; Medial MA: 9.2    Prepared and signed by    BARRY Zamudio MD [facility] [place]  Signed 24-Jul-2018 13:49:12         Labs and Studies from previous 24 hours have been personally reviewed by myself [facility] Problems as of 7/26/2018  Date Reviewed: 7/18/2018          Codes Class Noted - Resolved POA    * (Principal)Left lower lobe pneumonia ([address] ICD-10-CM: J18.1  ICD-9-CM: 228  7/23/2018 - Present Yes Acute conjunctivitis ICD-10-CM: H10.30  ICD-9-CM: 372.00  7/23/2018 - Present Yes        Pneumonia ICD-10-CM: J18.9  ICD-9-CM: 884  7/23/2018 - Present         Elevated troponin ICD-10-CM: R74.8  ICD-9-CM: 790.6  7/23/2018 - Present Yes        Acquired hypothyroidism (Chronic) ICD-10-CM: E03.9  ICD-9-CM: 244.9  11/10/2015 - Present Yes        Pulmonary hypertension ([address] ICD-10-CM: I27.20  ICD-9-CM: 416.8  6/15/2015 - Present Yes    Overview Addendum 11/10/2015  8:52 AM by Joon Carlos MD     Pulmonary Hypertension: had heart cath 4/16/15. At that time her mPAP was only 22, though her systolic PAP was elevated in the 50's and her PVR was elevated at 6. The low mPAP at that time is not conistent with the current TTE findings of RVSP of 110. She has an autoimmune process that according to her has not been labelled as a specific entity by her rheumatologists in the past.   [address] with vaso testing:  BASELINE MEASUREMENTS:  Pulmonary artery 70/30, mean of 40. Pulmonary artery saturation 70%. Cardiac output 3.2 L/min. Cardiac index 1.8 L/min per meter squared. Adenosine 10 mcg/kg per minute: Pulmonary artery 65/30, mean 40. Cardiac  output 3.1 L/min. Cardiac index 1.8 L/min. Pulmonary artery saturation  74%. Adenosine 30 mcg/kg per minute: Pulmonary artery 50/28, mean 38. Cardiac output 3.4 L/min. Cardiac index 2.0 L/min per meter squared. Pulmonary artery saturation 76%. Adenosine 50 mcg/kg per minute: Pulmonary artery 48/22, mean pulmonary  artery 37. Cardiac output 3.8 L/min. Cardiac index 2.15 L/min per meter squared. Pulmonary artery 77%. Pulmonary capillary wedge pressure is 5-6 mmHg. Mean transpulmonary gradient at baseline 35, without significant change  at peak adenosine.     Non resposive PAHTN ( MPAP should decrease by at least 10 and to below 40 mmHg)  CaCb likely not beneficial.             Hyperlipidemia (Chronic) ICD-10-CM: E78.5  ICD-9-CM: 272.4  4/9/2014 - Present Yes Hypertension (Chronic) ICD-10-CM: I10  ICD-9-CM: 401.9  4/9/2014 - Present Yes        RESOLVED: Cardiomyopathy, dilated (HCC) (Chronic) ICD-10-CM: I42.0  ICD-9-CM: 425.4  11/14/2017 - 7/24/2018 Yes        RESOLVED: CAD (coronary artery disease) (Chronic) ICD-10-CM: I25.10  ICD-9-CM: 414.00  4/9/2014 - 7/24/2018 Yes    Overview Addendum 6/11/2015  4:42 PM by ANUPAMA Horner     Moderate CAD 4/15                   Plan:  LLL Pneumonia:   - on Aztreonam/ Doxyccyline . Was in hospital 1 month prior. - tolerated Ancef in the past per medical records. Will try Vantin and monitor 24h  - follow BC 2/2 negative at 2 days  -  sputum cultures negative  - Mucinex      Abdominal pain:  - resolved  - check US abdomen w/o acute pathology. Chronic dCHF:  - continue current management  - TTE with EF:50-65%  - daily weight  - strict I/O    Elevated Trop I:   - improving  - no SOB, CP  - TTE: EF:50-65%  - hx of L and R cath on 3/2018  - seen by cardiology -  likely due to mismatch because of infection. Appreciate the help    HTN:  - BP well control  - continue current management      HLP:  - not on statins  - Cholesterol:131m /LDL:60, HDL:59    Acute conjunctivitis:  -on  Gentamycin  - improving    Hypothyroidism:   - on Levothyroxine  - stable     Debility:  -PT/CM      DVT Prophylaxis: Heparin SQ  CODE Status: DNR  Plan of Care Discussed with: patient and  at bedside.  Care team.  Medical Risk: high  Disposition: home when medically stable, hopefully tomorrow    Raymond Barahona MD  07/26/18

## 2018-07-26 NOTE — PROGRESS NOTES
Problem: Interdisciplinary Rounds  Goal: Interdisciplinary Rounds  Interdisciplinary team rounds were held 7/26/2018 with the following team members:Care Management, Physical Therapy, Physician and Clinical Coordinator and the patient and friend. Plan of care discussed. See clinical pathway and/or care plan for interventions and desired outcomes.

## 2018-07-26 NOTE — PROGRESS NOTES
Patient rested well during night on 2L n/c, states she did not cough as much throughout night, has been NPO after midnight, patient without c/o pain or discomfort, denies needs, call light within reach.

## 2018-07-27 VITALS
WEIGHT: 167.2 LBS | BODY MASS INDEX: 31.57 KG/M2 | HEIGHT: 61 IN | TEMPERATURE: 97.8 F | RESPIRATION RATE: 19 BRPM | SYSTOLIC BLOOD PRESSURE: 132 MMHG | HEART RATE: 85 BPM | OXYGEN SATURATION: 97 % | DIASTOLIC BLOOD PRESSURE: 71 MMHG

## 2018-07-27 LAB
BACTERIA SPEC CULT: NORMAL
ERYTHROCYTE [DISTWIDTH] IN BLOOD BY AUTOMATED COUNT: 13.9 % (ref 11.9–14.6)
GRAM STN SPEC: NORMAL
HCT VFR BLD AUTO: 36.3 % (ref 35.8–46.3)
HGB BLD-MCNC: 12 G/DL (ref 11.7–15.4)
MCH RBC QN AUTO: 31 PG (ref 26.1–32.9)
MCHC RBC AUTO-ENTMCNC: 33.1 G/DL (ref 31.4–35)
MCV RBC AUTO: 93.8 FL (ref 79.6–97.8)
PLATELET # BLD AUTO: 275 K/UL (ref 150–450)
PMV BLD AUTO: 8.7 FL (ref 10.8–14.1)
RBC # BLD AUTO: 3.87 M/UL (ref 4.05–5.25)
SERVICE CMNT-IMP: NORMAL
WBC # BLD AUTO: 6.4 K/UL (ref 4.3–11.1)

## 2018-07-27 PROCEDURE — 74011250637 HC RX REV CODE- 250/637: Performed by: INTERNAL MEDICINE

## 2018-07-27 PROCEDURE — 74011250637 HC RX REV CODE- 250/637: Performed by: HOSPITALIST

## 2018-07-27 PROCEDURE — 94760 N-INVAS EAR/PLS OXIMETRY 1: CPT

## 2018-07-27 PROCEDURE — 74011250637 HC RX REV CODE- 250/637: Performed by: EMERGENCY MEDICINE

## 2018-07-27 PROCEDURE — 77010033678 HC OXYGEN DAILY

## 2018-07-27 PROCEDURE — 36415 COLL VENOUS BLD VENIPUNCTURE: CPT | Performed by: HOSPITALIST

## 2018-07-27 PROCEDURE — 85027 COMPLETE CBC AUTOMATED: CPT | Performed by: HOSPITALIST

## 2018-07-27 RX ORDER — RANITIDINE 150 MG/1
150 TABLET, FILM COATED ORAL 2 TIMES DAILY
Qty: 28 TAB | Refills: 0 | Status: SHIPPED | OUTPATIENT
Start: 2018-07-27 | End: 2018-08-02

## 2018-07-27 RX ORDER — DILTIAZEM HYDROCHLORIDE 120 MG/1
120 CAPSULE, COATED, EXTENDED RELEASE ORAL DAILY
Qty: 30 CAP | Refills: 0 | Status: SHIPPED | OUTPATIENT
Start: 2018-07-28 | End: 2018-08-31 | Stop reason: SDUPTHER

## 2018-07-27 RX ORDER — GENTAMICIN SULFATE 3 MG/ML
1 SOLUTION/ DROPS OPHTHALMIC 4 TIMES DAILY
Qty: 1 BOTTLE | Refills: 0 | Status: SHIPPED | OUTPATIENT
Start: 2018-07-27 | End: 2018-08-03

## 2018-07-27 RX ORDER — DOXYCYCLINE 100 MG/1
100 CAPSULE ORAL EVERY 12 HOURS
Qty: 4 CAP | Refills: 0 | Status: SHIPPED | OUTPATIENT
Start: 2018-07-27 | End: 2018-09-28

## 2018-07-27 RX ORDER — CEFPODOXIME PROXETIL 200 MG/1
200 TABLET, FILM COATED ORAL EVERY 12 HOURS
Qty: 8 TAB | Refills: 0 | Status: SHIPPED | OUTPATIENT
Start: 2018-07-27 | End: 2018-07-31

## 2018-07-27 RX ORDER — FLUTICASONE PROPIONATE 50 MCG
2 SPRAY, SUSPENSION (ML) NASAL DAILY
Qty: 1 BOTTLE | Refills: 0 | Status: SHIPPED | OUTPATIENT
Start: 2018-07-27 | End: 2018-08-01

## 2018-07-27 RX ADMIN — FAMOTIDINE 20 MG: 20 TABLET ORAL at 09:15

## 2018-07-27 RX ADMIN — LEVOTHYROXINE SODIUM 112 MCG: 112 TABLET ORAL at 06:19

## 2018-07-27 RX ADMIN — DOXYCYCLINE HYCLATE 100 MG: 100 CAPSULE ORAL at 09:14

## 2018-07-27 RX ADMIN — GUAIFENESIN 1200 MG: 600 TABLET, EXTENDED RELEASE ORAL at 09:14

## 2018-07-27 RX ADMIN — SPIRONOLACTONE 12.5 MG: 25 TABLET, FILM COATED ORAL at 06:19

## 2018-07-27 RX ADMIN — VITAMIN D, TAB 1000IU (100/BT) 2000 UNITS: 25 TAB at 09:14

## 2018-07-27 RX ADMIN — DILTIAZEM HYDROCHLORIDE 120 MG: 120 CAPSULE, COATED, EXTENDED RELEASE ORAL at 09:14

## 2018-07-27 RX ADMIN — TADALAFIL 40 MG: 20 TABLET ORAL at 09:14

## 2018-07-27 RX ADMIN — FUROSEMIDE 20 MG: 40 TABLET ORAL at 09:15

## 2018-07-27 RX ADMIN — Medication 10 ML: at 06:18

## 2018-07-27 RX ADMIN — Medication 400 MG: at 09:14

## 2018-07-27 RX ADMIN — GENTAMICIN SULFATE 1 DROP: 3 SOLUTION OPHTHALMIC at 09:13

## 2018-07-27 RX ADMIN — CEFPODOXIME PROXETIL 200 MG: 200 TABLET, FILM COATED ORAL at 09:14

## 2018-07-27 NOTE — PROGRESS NOTES
Patient is alert and oriented X4, ambulatory in room, on RA, patient denies pain and discomfort, continues to c/o dry cough, denies needs, call light within reach.

## 2018-07-27 NOTE — PROGRESS NOTES
Discharge instructions and prescriptions provided and explained to patient, patient voiced understanding. Medication side effect sheet reviewed with patient. No home medications or valuables to return. Opportunity for questions provided. Instructed to call once ready to leave the floor.

## 2018-07-27 NOTE — PROGRESS NOTES
In accordance with Medicare guidelines, a copy of the Important Letter from Medicare was presented to the patient/family in anticipation of expected discharge within 48 hours. One copy was given to the patient, and a signed copy was placed in the patients chart. Patient's Care Managers notified.

## 2018-07-27 NOTE — PROGRESS NOTES
Patient is alert and oriented to time, place, and self. Patient on room air,  Patient has crackles bi-laterally in base of lungs, heart rate is 90 in normal sinus,  active bowel sounds, no edema present, no wounds present. Patient stated no pain at present time.

## 2018-07-27 NOTE — DISCHARGE SUMMARY
Hospitalist Discharge Summary   Patient ID:  Kirti Allen  [id number]  46 y.o.  1940  Admit date: 7/23/2018  6:05 AM  Discharge date and time: 7/27/2018  Attending: Hilaria Pollard MD  PCP:  Charli Persaud MD  Treatment Team: Attending Provider: Celina Quinones MD; Utilization Review: Wilfred Mesa RN; Transitional Nurse Navigator: Stephanie Parham RN; Care Manager: Jing Snyder. Opal Petersen  Principal Diagnosis Left lower lobe pneumonia [facility])   Principal Problem:    Left lower lobe pneumonia ([facility] (7/23/2018)    Active Problems:    Hyperlipidemia (4/9/2014)      Hypertension (4/9/2014)      Pulmonary hypertension ([facility] (6/15/2015)      Overview: Pulmonary Hypertension: had heart cath 4/16/15. At that time her mPAP was only 22, though her systolic PAP was elevated in       the 50's and her PVR was elevated at 6. The low mPAP at that time is not       conistent with the current TTE findings of RVSP of 110. She has an autoimmune process that according to her has not been labelled       as a specific entity by her rheumatologists in the past.       [address] with vaso testing:      BASELINE MEASUREMENTS:      Pulmonary artery 70/30, mean of 40. Pulmonary artery saturation 70%. Cardiac output 3.2 L/min. Cardiac index 1.8 L/min per meter squared. Adenosine 10 mcg/kg per minute: Pulmonary artery 65/30, mean 40. Cardiac      output 3.1 L/min. Cardiac index 1.8 L/min. Pulmonary artery saturation      74%. Adenosine 30 mcg/kg per minute: Pulmonary artery 50/28, mean 38. Cardiac output 3.4 L/min. Cardiac index 2.0 L/min per meter squared. Pulmonary artery saturation 76%. Adenosine 50 mcg/kg per minute: Pulmonary artery 48/22, mean pulmonary      artery 37. Cardiac output 3.8 L/min. Cardiac index 2.15 L/min per meter squared. Pulmonary artery 77%. Pulmonary capillary wedge pressure is 5-6 mmHg.       Mean transpulmonary gradient at baseline 35, without significant change      at peak adenosine. Non resposive PAHTN ( MPAP should decrease by at least 10 and to below 40       mmHg)      CaCb likely not beneficial.      Acquired hypothyroidism (11/10/2015)      Acute conjunctivitis (7/23/2018)      Elevated troponin (7/23/2018)       * Admission Diagnoses: Pneumonia  * Discharge Diagnoses:    Hospital Problems as of 7/27/2018  Date Reviewed: 7/18/2018          Codes Class Noted - Resolved POA    * (Principal)Left lower lobe pneumonia ([facility] ICD-10-CM: J18.1  ICD-9-CM: 730  7/23/2018 - Present Yes        Acute conjunctivitis ICD-10-CM: H10.30  ICD-9-CM: 372.00  7/23/2018 - Present Yes        Pneumonia ICD-10-CM: J18.9  ICD-9-CM: 667  7/23/2018 - Present         Elevated troponin ICD-10-CM: R74.8  ICD-9-CM: 790.6  7/23/2018 - Present Yes        Acquired hypothyroidism (Chronic) ICD-10-CM: E03.9  ICD-9-CM: 244.9  11/10/2015 - Present Yes        Pulmonary hypertension ([facility] ICD-10-CM: I27.20  ICD-9-CM: 416.8  6/15/2015 - Present Yes    Overview Addendum 11/10/2015  8:52 AM by Dimitrios Ramirez MD     Pulmonary Hypertension: had heart cath 4/16/15. At that time her mPAP was only 22, though her systolic PAP was elevated in the 50's and her PVR was elevated at 6. The low mPAP at that time is not conistent with the current TTE findings of RVSP of 110. She has an autoimmune process that according to her has not been labelled as a specific entity by her rheumatologists in the past.   [address] with vaso testing:  BASELINE MEASUREMENTS:  Pulmonary artery 70/30, mean of 40. Pulmonary artery saturation 70%. Cardiac output 3.2 L/min. Cardiac index 1.8 L/min per meter squared. Adenosine 10 mcg/kg per minute: Pulmonary artery 65/30, mean 40. Cardiac  output 3.1 L/min. Cardiac index 1.8 L/min. Pulmonary artery saturation  74%. Adenosine 30 mcg/kg per minute: Pulmonary artery 50/28, mean 38. Cardiac output 3.4 L/min.   Cardiac index 2.0 L/min per meter squared. Pulmonary artery saturation 76%. Adenosine 50 mcg/kg per minute: Pulmonary artery 48/22, mean pulmonary  artery 37. Cardiac output 3.8 L/min. Cardiac index 2.15 L/min per meter squared. Pulmonary artery 77%. Pulmonary capillary wedge pressure is 5-6 mmHg. Mean transpulmonary gradient at baseline 35, without significant change  at peak adenosine. Non resposive PAHTN ( MPAP should decrease by at least 10 and to below 40 mmHg)  CaCb likely not beneficial.             Hyperlipidemia (Chronic) ICD-10-CM: E78.5  ICD-9-CM: 272.4  4/9/2014 - Present Yes        Hypertension (Chronic) ICD-10-CM: I10  ICD-9-CM: 401.9  4/9/2014 - Present Yes        RESOLVED: Cardiomyopathy, dilated (HCC) (Chronic) ICD-10-CM: I42.0  ICD-9-CM: 425.4  11/14/2017 - 7/24/2018 Yes        RESOLVED: CAD (coronary artery disease) (Chronic) ICD-10-CM: I25.10  ICD-9-CM: 414.00  4/9/2014 - 7/24/2018 Yes    Overview Addendum 6/11/2015  4:42 PM by ANUPAMA Huggins     Moderate CAD 4/15                     Hospital Course: Please refer to the admission H&P for details of presentation. In summary, Stephanie Benavides is a 66 y.o. female with PMHx of  Mild CAD (L and R cath  On 3/9/018), dilated cardiomyopathy, chronic congestive heart failure, pulmonary hypertension,  hypertension, hyperlipidemia, hypothyroidism, chronic respiratory failure on NC 2.5L who was admitted on 7/23/2018 for cough and shortness of breath thought to be due to LLL pneumonia. Procalcitonin 0.2. Started on aztreonam and  doxycyline due to multiple abx allergies. Was admitted in [place] 1 month prior for acute exacerbation of CHF. Slowly her clinical status improved and antibiotics  Were transitioned to Vantin and PO Doxy. Hx of allergy to Keflex, although tolerated ancef in the past per medical records. Tolerated Vantin without any signs of allergy. Troponin I was elevated, without any chest pain. Echocardiogram  with EF:55-60%.  Cardiology consulted and thought that is most likely demand mismatch in the setting of pulmonary hypertension and acute infection. Low dose of Cardizem added. Slowly her clinical status improved and Ms. Rivera was discharged home in a stable clinical condition.  Advised to finish full course of antibiotics and follow-up with PCP in 3-5 days with repeat CBC at that time.         Consults:  Cardiology    Diagnostic Study/Procedure results summary copied from within [facility] EMR:      Labs: Results:       Chemistry Recent Labs      07/26/18   0632  07/25/18   0549   GLU  93  96   NA  136  135*   K  4.0  3.8   CL  100  100   CO2  29  25   BUN  9  9   CREA  0.73  0.66   CA  9.3  9.2   AGAP  7  10   TBILI  0.4  0.6   ALT  18  19   AP  71  80   TP  6.5  6.8   ALB  2.7*  2.6*   GLOB  3.8*  4.2*   AGRAT  0.7*  0.6*      CBC w/Diff Recent Labs      07/27/18   0638  07/26/18   0632  07/25/18   0549   WBC  6.4  7.0  7.1   RBC  3.87*  3.71*  3.81*   HGB  12.0  11.5*  11.7   HCT  36.3  34.8*  35.4*   PLT  275  268  262   GRANS   --    --   61   LYMPH   --    --   20   EOS   --    --   4                      Liver Enzymes Recent Labs      07/26/18   0632   TP  6.5   ALB  2.7*   AP  71   SGOT  18      Thyroid Studies Lab Results   Component Value Date/Time          TSH 1.550 07/19/2018 07:50 AM          All Micro Results     Procedure Component Value Units Date/Time    CULTURE, RESPIRATORY/SPUTUM/BRONCH Dewane Peace STAIN [[id number]] Collected:  07/24/18 2230    Order Status:  Completed Specimen:  Sputum from Sputum Updated:  07/27/18 0718     Special Requests: NO SPECIAL REQUESTS        GRAM STAIN 20 TO 45 WBC'S/OIF      0 TO 3 EPITHELIAL CELLS SEEN /OIF              MODERATE GRAM POSITIVE COCCI              MODERATE GRAM NEGATIVE RODS      FEW YEAST         FEW GRAM POSITIVE RODS         3+ MUCUS PRESENT        Culture result:         MODERATE NORMAL RESPIRATORY CAYLA    CULTURE, BLOOD [[id number]] Collected:  07/23/18 0810    Order Status:  Completed Specimen:  Blood Updated:  18     Special Requests: LEFT ANTECUBITAL        Culture result: NO GROWTH 4 DAYS       CULTURE, BLOOD [[id number]] Collected:  18    Order Status:  Completed Specimen:  Blood Updated:  18     Special Requests: --        LEFT  HAND       Culture result: NO GROWTH 4 DAYS       CULTURE, RESPIRATORY/SPUTUM/BRONCH Sana Bracket STAIN [[id number]]     Order Status:  Canceled Specimen:  Sputum from Sputum     CULTURE, BLOOD, PAIRED [[id number]] Collected:  18    Order Status:  Canceled Specimen:  [address]  Result Date: 2018  IMPRESSION: 1. No gallstones, or additional findings of the gallbladder to suggest cholecystitis. If there is continued concern for cholecystitis, then further evaluation can be considered with a nuclear medicine hepatobiliary scan. 2.  No intra- or extrahepatic biliary ductal dilitation. 3.  No abnormal fluid collection seen. Xr Chest Port  Result Date: 2018  IMPRESSION: Small focus of left lower lobe atelectasis or pneumonia.        Results for orders placed or performed during the hospital encounter of 18   2D ECHO COMPLETE ADULT (TTE) W OR [address] [facility] [address] [place] [address], [address] [place] [address]  [phone]    Transthoracic Echocardiogram  2D, M-mode, Doppler, and Color Doppler    Patient: Vadim Mcdonough  MR #: [id number]  : 1940  Age: 66 years  Gender: Female  Study date: 2018  Account #: [de-identified]  Height: 60.6 in  Weight: 163.7 lb  BSA: 1.73 mï¾²  Status:Routine  Location: 829  BP: 124/ 84    Allergies: OTHER FOOD, ESOMEPRAZOLE MAGNESIUM, IODINATED CONTRAST- ORAL AND   IV  DYE, IODINE AND IODIDE CONTAINING PRODUCTS, SHELLFISH DERIVED, AMITRIPTYLINE,  AMLODIPINE, AMOXICILLIN, OLMESARTAN, NEBIVOLOL, FLECAINIDE,  HYDROCHLOROTHIAZIDE, IBUPROFEN, IODINE, CEPHALEXIN, LEVOFLOXACIN (BULK),  LISINOPRIL, LISINOPRIL-HYDROCHLOROTHIAZIDE, ESOMEPRAZOLE MAGNESIUM,  NITROFURANTOIN, NITROFURANTOIN MACROCRYSTAL, NITROFURANTOIN MACROCRYSTALLINE,  SULFA (SULFONAMIDE ANTIBIOTICS)    Sonographer:  Lawson Hernandez, [facility]  Group:  [id number] Cardiology  Referring Physician:  Kirill Whittaker. MD Juan [facility]  Reading Physician:  BARRY Paulino MD [facility]    INDICATIONS: Dyspnea, Elevated Troponin. PROCEDURE: This was a routine study. A transthoracic echocardiogram was  performed. The study included complete 2D imaging, M-mode, complete spectral  Doppler, and color Doppler. Intravenous contrast (Definity) was administered. Image quality was adequate. LEFT VENTRICLE: Size was normal. Systolic function was normal. Ejection  fraction was estimated in the range of 55 % to 60 %. There were no regional  wall motion abnormalities. Wall thickness was normal. The E/e' ratio was   8. 15. Diastolic function was indeterminate. The study was not technically   sufficient  to allow evaluation of LV diastolic function. RIGHT VENTRICLE: The size was normal. Systolic function was normal. The  tricuspid jet envelope definition was inadequate for estimation of RV   systolic  pressure. LEFT ATRIUM: The atrium was mildly dilated. RIGHT ATRIUM: The atrium was mildly dilated. SYSTEMIC VEINS: IVC: The inferior vena cava was normal in size and course. AORTIC VALVE: The valve was trileaflet. Leaflets exhibited mild sclerosis. There was no evidence for stenosis. There was no insufficiency. MITRAL VALVE: Valve structure was normal. There was no evidence for stenosis. There was mild regurgitation. TRICUSPID VALVE: The valve structure was normal. There was no evidence for  stenosis. There was mild regurgitation. PULMONIC VALVE: The valve structure was normal. There was no evidence for  stenosis. There was mild regurgitation. PERICARDIUM: There was no pericardial effusion. AORTA: The root exhibited normal size.     SUMMARY:    - Left ventricle: Systolic function was normal. Ejection fraction was  estimated in the range of 55 % to 60 %. There were no regional wall motion  abnormalities. -  Left atrium: The atrium was mildly dilated. -  Right atrium: The atrium was mildly dilated. -  Aortic valve: The valve was trileaflet. Leaflets exhibited mild sclerosis. -  Mitral valve: There was mild regurgitation.    -  Tricuspid valve: There was mild regurgitation. SYSTEM MEASUREMENT TABLES    2D mode  AoR Diam (2D): 2.8 cm  LA Dimension (2D): 4.1 cm  Left Atrium Systolic Volume Index; Method of Disks, Biplane; 2D mode;: 32.7  ml/m2  IVS/LVPW (2D): 3.1  IVSd (2D): 0.9 cm  LVIDd (2D): 4.5 cm  LVIDs (2D): 3.2 cm  LVOT Area (2D): 3.1 cm2  LVPWd (2D): 1 cm  RVIDd (2D): 3.4 cm    Tissue Doppler Imaging  LV Peak Early Guerrero Tissue Refugio; Lateral MA (TDI): 9 cm/s  LV Peak Early Guerrero Tissue Refugio; Medial MA (TDI): 6.9 cm/s    Unspecified Scan Mode  Peak Grad; Mean; Antegrade Flow: 9 mm[Hg]  Vmax; Antegrade Flow: 151 cm/s  LVOT Diam: 2 cm  MV Peak Refugio/LV Peak Tissue Refugio E-Wave; Lateral MA: 7.1  MV Peak Refugio/LV Peak Tissue Refugio E-Wave; Medial MA: 9.2    Prepared and signed by    BARRY Knowles MD [facility]  Signed 2018 13:49:12           Discharge Exam:  Patient Vitals for the past 24 hrs:   Temp Pulse Resp BP SpO2   18 0758 - - - - 97 %   18 0753 97.8 °F (36.6 °C) 85 19 132/71 95 %   18 0256 - - 18 - -   18 2258 98.2 °F (36.8 °C) 88 16 110/75 96 %   18 1920 97.5 °F (36.4 °C) 94 18 145/65 94 %   18 1500 98 °F (36.7 °C) 70 18 118/71 96 %   18 1129 98 °F (36.7 °C) 60 19 156/83 96 %     Temp (24hrs), Av.9 °F (36.6 °C), Min:97.5 °F (36.4 °C), Max:98.2 °F (36.8 °C)    Oxygen Therapy  O2 Sat (%): 97 % (18 0758)  Pulse via Oximetry: 87 beats per minute (18)  O2 Device: Non-invasive cannula (18)  O2 Flow Rate (L/min): 2.5 l/min (18 09)    Intake/Output Summary (Last 24 hours) at 07/27/18 4944  Last data filed at 07/27/18 0640   Gross per 24 hour   Intake              380 ml   Output             1475 ml   Net            -1095 ml       Physical Exam:  General:         Alert, cooperative, no acute  Distress. Afebrile. HEENT:               NCAT. No obvious deformity. Nares normal. No drainage  Lungs:               Decreased air entry b/l. No wheezing/rhonchi/rales  NC 2.5L  Cardiovascular:   RRR. No m/r/g. Trace pitting pedal edema b/l. +2 PT/DT pulses b/l. Abdomen:       Soft, tender in epigastric area. Mild positive Lenz sign. No rebound tenderness.   + BS  Skin:         No rashes or lesions. Not Jaundiced  Neurologic:    AAOx3. No gross focal deficit. Moves all extremities. Psychiatric:         Good mood. Normal affect. Denies any SI/HI. Disposition: home with [facility]  Discharge Condition: stable  Patient Instructions:   Current Discharge Medication List      START taking these medications    Details   cefpodoxime (VANTIN) 200 mg tablet Take 1 Tab by mouth every twelve (12) hours for 4 days. Qty: 8 Tab, Refills: 0      doxycycline (VIBRAMYCIN) 100 mg capsule Take 1 Cap by mouth every twelve (12) hours. Qty: 4 Cap, Refills: 0      fluticasone (FLONASE) 50 mcg/actuation nasal spray 2 Sprays by Both Nostrils route daily for 5 days. Qty: 1 Bottle, Refills: 0      gentamicin (GARAMYCIN) 0.3 % ophthalmic solution Administer 1 Drop to right eye four (4) times daily for 7 days. Qty: 1 Bottle, Refills: 0      guaiFENesin ER (MUCINEX) 1,200 mg Ta12 ER tablet Take 1 Tab by mouth two (2) times a day for 5 days. Qty: 10 Tab, Refills: 0      raNITIdine (ZANTAC) 150 mg tablet Take 1 Tab by mouth two (2) times a day for 14 days. Qty: 28 Tab, Refills: 0      guaiFENesin-dextromethorphan SR (HUMIBID DM) 600-30 mg per tablet Take 1 Tab by mouth every twelve (12) hours as needed for Cough or Congestion for up to 10 days.   Qty: 20 Tab, Refills: 0      dilTIAZem CD (CARDIZEM CD) 120 mg ER capsule Take 1 Cap by mouth daily. Qty: 30 Cap, Refills: 0         CONTINUE these medications which have NOT CHANGED    Details   acetaminophen (TYLENOL) 500 mg tablet Take 2 Tabs by mouth every six (6) hours as needed for Pain. Qty: 120 Tab, Refills: 11    Associated Diagnoses: DDD (degenerative disc disease), cervical; Osteoarthritis of spine with radiculopathy, cervical region      levothyroxine (SYNTHROID) 112 mcg tablet Take 1 Tab by mouth Daily (before breakfast). Indications: hypothyroidism  Qty: 90 Tab, Refills: 3    Associated Diagnoses: Acquired hypothyroidism      furosemide (LASIX) 20 mg tablet Take 1 Tab by mouth daily. Qty: 90 Tab, Refills: 3    Associated Diagnoses: Chronic ITP (idiopathic thrombocytopenia) ([facility])      spironolactone (ALDACTONE) 25 mg tablet Take 1 Tab by mouth every morning. Qty: 90 Tab, Refills: 3      magnesium oxide (MAG-OX) 400 mg tablet Take 1 Tab by mouth two (2) times a day. Qty: 180 Tab, Refills: 3    Associated Diagnoses: Essential hypertension; Hypomagnesemia      ketoprofen, micronized 10 % cmap by Apply Externally route. Associated Diagnoses: Primary osteoarthritis involving multiple joints      zaleplon (SONATA) 10 mg capsule Take 1 Cap by mouth nightly as needed. Max Daily Amount: 10 mg.  Qty: 30 Cap, Refills: 5    Associated Diagnoses: Chronic ITP (idiopathic thrombocytopenia) ([facility])      cholecalciferol, vitamin D3, 2,000 unit tab Take 1 Tab by mouth every morning. Qty: 90 Tab, Refills: 3    Associated Diagnoses: Hypovitaminosis D      OTHER Ketoprofen 10%. Apply 3 to 4 times daily  Compounded cream RX # [id number]  Qty: 60 g, Refills: prn      ondansetron (ZOFRAN ODT) 4 mg disintegrating tablet Take 1 Tab by mouth every six (6) hours as needed for Nausea. Qty: 15 Tab, Refills: 0      vit a,c & e-lutein-minerals (HEALTHY EYES) tablet Take 2 Tabs by mouth daily. thera tears      estradiol (ESTRACE) 0.5 mg tablet Take 1 Tab by mouth daily.   Qty: 90 Tab, Refills: 3    Associated Diagnoses: Chronic ITP (idiopathic thrombocytopenia) (HCC)      Omega-3-DHA-EPA-Fish Oil (FISH OIL) 1,000 mg (120 mg-180 mg) cap Take  by mouth. MILK THISTLE PO Take  by mouth. therapeutic multivitamin (THERAGRAN) tablet Take 1 Tab by mouth daily. tadalafil (ADCIRCA) 20 mg tablet Take 40 mg by mouth daily. For lungs      OXYGEN-AIR DELIVERY SYSTEMS 2 l/m continuous  Indications: 2 l/m continuous      B.infantis-B.ani-B.long-B.bifi (PROBIOTIC 4X) 10-15 mg TbEC Take  by mouth every morning. Activity: PT/OT per Home Health  Diet: DIET CARDIAC Regular  Wound Care: None needed      DNR   Surrogate decision maker: discussed the plan with Ms. Rivera and her  at bedside. Care team.   Pneumonia and flu vaccine to be administered at discharge per hospital protocol     Follow-up  Follow-up Information     Follow up With Details Comments Ezar Howe MD In 3 days Follow up with PCP 3-5 days after discharge.   [place] [id number]  [address]  Internal Medicine and [address] [place] [address] [address] [place] [address] In 4 weeks  [place] [address] Cardiology [address]  [phone]            Time spent to discharge patient 27 minutes  Signed:  Walter Avila MD  7/27/2018

## 2018-07-27 NOTE — DISCHARGE INSTRUCTIONS
Finish full course of antibiotics for pneumonia  Finish full course of antibiotics for right eye infection. If not improving please follow-up with  ophthalmology  Follow-up with PCP in 3-5 days and recheck CBC at that time  Follow-up with cardiology in 3-4 weeks  IF any fevers, increased cough or any worrisome symptoms please call PCP, 911 your pulmonologist or go to nearest ED             Pneumonia: Care Instructions  Your Care Instructions    Pneumonia is an infection of the lungs. Most cases are caused by infections from bacteria or viruses. Pneumonia may be mild or very severe. If it is caused by bacteria, you will be treated with antibiotics. It may take a few weeks to a few months to recover fully from pneumonia, depending on how sick you were and whether your overall health is good. Follow-up care is a key part of your treatment and safety. Be sure to make and go to all appointments, and call your doctor if you are having problems. It's also a good idea to know your test results and keep a list of the medicines you take. How can you care for yourself at home? · Take your antibiotics exactly as directed. Do not stop taking the medicine just because you are feeling better. You need to take the full course of antibiotics. · Take your medicines exactly as prescribed. Call your doctor if you think you are having a problem with your medicine. · Get plenty of rest and sleep. You may feel weak and tired for a while, but your energy level will improve with time. · To prevent dehydration, drink plenty of fluids, enough so that your urine is light yellow or clear like water. Choose water and other caffeine-free clear liquids until you feel better. If you have kidney, heart, or liver disease and have to limit fluids, talk with your doctor before you increase the amount of fluids you drink. · Take care of your cough so you can rest. A cough that brings up mucus from your lungs is common with pneumonia.  It is one way your body gets rid of the infection. But if coughing keeps you from resting or causes severe fatigue and chest-wall pain, talk to your doctor. He or she may suggest that you take a medicine to reduce the cough. · Use a vaporizer or humidifier to add moisture to your bedroom. Follow the directions for cleaning the machine. · Do not smoke or allow others to smoke around you. Smoke will make your cough last longer. If you need help quitting, talk to your doctor about stop-smoking programs and medicines. These can increase your chances of quitting for good. · Take an over-the-counter pain medicine, such as acetaminophen (Tylenol), ibuprofen (Advil, Motrin), or naproxen (Aleve). Read and follow all instructions on the label. · Do not take two or more pain medicines at the same time unless the doctor told you to. Many pain medicines have acetaminophen, which is Tylenol. Too much acetaminophen (Tylenol) can be harmful. · If you were given a spirometer to measure how well your lungs are working, use it as instructed. This can help your doctor tell how your recovery is going. · To prevent pneumonia in the future, talk to your doctor about getting a flu vaccine (once a year) and a pneumococcal vaccine (one time only for most people). When should you call for help? Call 911 anytime you think you may need emergency care. For example, call if:    · You have severe trouble breathing.    Call your doctor now or seek immediate medical care if:    · You cough up dark brown or bloody mucus (sputum).     · You have new or worse trouble breathing.     · You are dizzy or lightheaded, or you feel like you may faint.    Watch closely for changes in your health, and be sure to contact your doctor if:    · You have a new or higher fever.     · You are coughing more deeply or more often.     · You are not getting better after 2 days (48 hours).     · You do not get better as expected. Where can you learn more?   Go to http://bernard.info/. Enter [id number] in the search box to learn more about \"Pneumonia: Care Instructions. \"  Current as of: December 6, 2017  Content Version: 11.7  © 1560-7373 [employer]. Care instructions adapted under license by [employer] (which disclaims liability or warranty for this information). If you have questions about a medical condition or this instruction, always ask your healthcare professional. [place] [address] any warranty or liability for your use of this information. DISCHARGE SUMMARY from Nurse    PATIENT INSTRUCTIONS:    After general anesthesia or intravenous sedation, for 24 hours or while taking prescription Narcotics:  · Limit your activities  · Do not drive and operate hazardous machinery  · Do not make important personal or business decisions  · Do  not drink alcoholic beverages  · If you have not urinated within 8 hours after discharge, please contact your surgeon on call. Report the following to your surgeon:  · Excessive pain, swelling, redness or odor of or around the surgical area  · Temperature over 100.5  · Nausea and vomiting lasting longer than 4 hours or if unable to take medications  · Any signs of decreased circulation or nerve impairment to extremity: change in color, persistent  numbness, tingling, coldness or increase pain  · Any questions    What to do at Home:  Recommended activity: Activity as tolerated, resume home diet as tolerated. *  Please give a list of your current medications to your Primary Care Provider. *  Please update this list whenever your medications are discontinued, doses are      changed, or new medications (including over-the-counter products) are added. *  Please carry medication information at all times in case of emergency situations.     These are general instructions for a healthy lifestyle:    No smoking/ No tobacco products/ Avoid exposure to second hand smoke  Surgeon General's Warning:  Quitting smoking now greatly reduces serious risk to your health. Obesity, smoking, and sedentary lifestyle greatly increases your risk for illness    A healthy diet, regular physical exercise & weight monitoring are important for maintaining a healthy lifestyle    You may be retaining fluid if you have a history of heart failure or if you experience any of the following symptoms:  Weight gain of 3 pounds or more overnight or 5 pounds in a week, increased swelling in our hands or feet or shortness of breath while lying flat in bed. Please call your doctor as soon as you notice any of these symptoms; do not wait until your next office visit. Recognize signs and symptoms of STROKE:    F-face looks uneven    A-arms unable to move or move unevenly    S-speech slurred or non-existent    T-time-call 911 as soon as signs and symptoms begin-DO NOT go       Back to bed or wait to see if you get better-TIME IS BRAIN. Warning Signs of HEART ATTACK     Call 911 if you have these symptoms:   Chest discomfort. Most heart attacks involve discomfort in the center of the chest that lasts more than a few minutes, or that goes away and comes back. It can feel like uncomfortable pressure, squeezing, fullness, or pain.  Discomfort in other areas of the upper body. Symptoms can include pain or discomfort in one or both arms, the back, neck, jaw, or stomach.  Shortness of breath with or without chest discomfort.  Other signs may include breaking out in a cold sweat, nausea, or lightheadedness. Don't wait more than five minutes to call 911 - MINUTES MATTER! Fast action can save your life. Calling 911 is almost always the fastest way to get lifesaving treatment. Emergency Medical Services staff can begin treatment when they arrive -- up to an hour sooner than if someone gets to the hospital by car. The discharge information has been reviewed with the patient.   The patient verbalized understanding. Discharge medications reviewed with the patient and appropriate educational materials and side effects teaching were provided.   ___________________________________________________________________________________________________________________________________

## 2018-07-27 NOTE — PROGRESS NOTES
Patient has rested quietly throughout night, on 2L n/c, denies pain and discomfort, denies needs, call light within reach.

## 2018-07-27 NOTE — PROGRESS NOTES
PRN tessalon, Norco 5mg, imodium 2mg and ambien 5mg given @ this time upon request, patient tolerated well.

## 2018-07-28 ENCOUNTER — HOME CARE VISIT (OUTPATIENT)
Dept: SCHEDULING | Facility: HOME HEALTH | Age: 78
End: 2018-07-28
Payer: MEDICARE

## 2018-07-28 VITALS
DIASTOLIC BLOOD PRESSURE: 70 MMHG | SYSTOLIC BLOOD PRESSURE: 130 MMHG | RESPIRATION RATE: 19 BRPM | OXYGEN SATURATION: 98 % | HEART RATE: 71 BPM | TEMPERATURE: 96.3 F

## 2018-07-28 LAB
BACTERIA SPEC CULT: NORMAL
BACTERIA SPEC CULT: NORMAL
SERVICE CMNT-IMP: NORMAL
SERVICE CMNT-IMP: NORMAL

## 2018-07-28 PROCEDURE — 3331090001 HH PPS REVENUE CREDIT

## 2018-07-28 PROCEDURE — 400013 HH SOC

## 2018-07-28 PROCEDURE — G0299 HHS/HOSPICE OF RN EA 15 MIN: HCPCS

## 2018-07-28 PROCEDURE — 3331090002 HH PPS REVENUE DEBIT

## 2018-07-29 PROCEDURE — 3331090001 HH PPS REVENUE CREDIT

## 2018-07-29 PROCEDURE — 3331090002 HH PPS REVENUE DEBIT

## 2018-07-30 ENCOUNTER — PATIENT OUTREACH (OUTPATIENT)
Dept: CASE MANAGEMENT | Age: 78
End: 2018-07-30

## 2018-07-30 ENCOUNTER — PATIENT OUTREACH (OUTPATIENT)
Dept: RESPIRATORY THERAPY | Age: 78
End: 2018-07-30

## 2018-07-30 VITALS
HEIGHT: 61 IN | SYSTOLIC BLOOD PRESSURE: 112 MMHG | OXYGEN SATURATION: 100 % | HEART RATE: 76 BPM | RESPIRATION RATE: 18 BRPM | WEIGHT: 163.8 LBS | BODY MASS INDEX: 30.93 KG/M2 | DIASTOLIC BLOOD PRESSURE: 74 MMHG | TEMPERATURE: 97.5 F

## 2018-07-30 PROCEDURE — 3331090001 HH PPS REVENUE CREDIT

## 2018-07-30 PROCEDURE — 3331090002 HH PPS REVENUE DEBIT

## 2018-07-30 NOTE — PROGRESS NOTES
Patient Care Setting Care Setting Type: [facility] Patient Care Setting: Lives with brady. [address] to follow. [place] Date: 07/27/18 Care Coordinator conducted hand-off?: Yes Patient Care Plan: On Track Comments: Health  to follow along with [facility] Health  visit planned for today.

## 2018-07-30 NOTE — PROGRESS NOTES
Home Visit #1 Initial Visit  Health  arrived at residence to find patient resting in her recliner. Patient is alert and oriented in no distress. Patient is well versed in her care and disease management. Explained [facility] Wellness program and benefits, conducted Safety Inspection, and initial assessment. Will follow the patient with an in home visit next week. Safety Concerns:Discussed long 02 cord and throw rugs as potential trip hazards    DME:In good working order. Follow Up Appointments:  8/02  [address] PCP  8/31  [address], [facility] Cardiology    Transportation:Self    Medications: All on hand and patient is compliant with meds as directed by her Cardiologist.     Education:[facility] wellness program, Low Sodium diet, Fluid restrictions, COPD action plan , CHF action plan, and emphasis on early interventions. Patient/Family Concerns:None    Tasks:None    Closing Comments:Physical Assessment completed and noted on assessment Form. COPD & CHF actions plan reviewed. Help line discussed. End of Visit.  Berny Crowder, Paramedic, Health

## 2018-07-30 NOTE — PROGRESS NOTES
This note will not be viewable in [address] Transition of Care Discharge Follow-up Questionnaire Date/Time of Call: 
 7/30/18 10:43am  
What was the patient hospitalized for? Left lower lobe pneumonia Does the patient understand his/her diagnosis and/or treatment and what happened during the hospitalization? Yes, spoke with patient Did the patient receive discharge instructions? Yes  
CM Assessed Risk for Readmission:  
 
 
Patient stated Risk for Readmission: SOB Sick, SOB, tired/fatigue Review any discharge instructions (see discharge instructions/AVS in [facility]). Ask patient if they understand these. Do they have any questions? Reviewed,  reports understanding of instructions Were home services ordered (nursing, PT, OT, ST, etc.)? [facility]  RN/PT If so, has the first visit occurred? If not, why? (Assist with coordination of services if necessary.) Coming today in about 10 minutes per patient Was any DME ordered? No 
  
If so, has it been received? If not, why?  (Assist patient in obtaining DME orders &/or equipment if necessary.) N/A Complete a review of all medications (new, continued and discontinued meds per the D/C instructions and medication tab in [facility]). Completed Were all new prescriptions filled? If not, why?  (Assist patient in obtaining medications if necessary  escalate for CCM &/or SW if ongoing issues are verbalized by pt or anticipated) 
 yes Does the patient understand the purpose and dosing instructions for all medications? (If patient has questions, provide explanation and education.) Yes Does the patient have any problems in performing ADLs? (If patient is unable to perform ADLs  what is the limiting factor(s)? Do they have a support system that can assist? If no support system is present, discuss possible assistance that they may be able to obtain.  Escalate for CCM/SW if ongoing issues are verbalized by pt or anticipated) 
 independent with all ADLs, fiancé assists with large tasks such as vacuuming and house chores Does the patient have all follow-up appointments scheduled? 7 day f/up with PCP?  
(f/up with PCP may be w/in 14 days if patient has a f/up with their specialist w/in 7 days) 7-14 day f/up with specialist?  
(or per discharge instructions) If f/up has not been made  what actions has the care coordinator made to accomplish this? Has transportation been arranged? Yes Thursday August 02, 2018 10:00 AM EDT Office Visit with Aaron Gaitan MD 
[facility] ([address]) [address] 
[address] [place] [id number] 
[phone] Friday August 31, 2018  4:30 PM EDT HOSPITAL FOLLOW-UP with Maria T Ponce, [id number]  [facility] OFFICE ([address] [place] [address]) [address] 
[address] [facility] 
[phone] Yes, patient still drives but hasnt been driving since not feeling well, fiancé provides transportation Any other questions or concerns expressed by the patient? No questions or concerns at this time Patient was given contact information for [facility], instructed to call with questions or concerns. Schedule next appointment with PHILIP SERNA Coordinator or refer to RN Case Manager/ per the workflow guidelines. When is care coordinators next follow-up call scheduled? If referred for CCM  what RN care manager was the referral assigned? Health  will follow [facility] Call Completed By: Ramos Dailey LPN Community Care Coordinator

## 2018-07-31 ENCOUNTER — HOME CARE VISIT (OUTPATIENT)
Dept: SCHEDULING | Facility: HOME HEALTH | Age: 78
End: 2018-07-31
Payer: MEDICARE

## 2018-07-31 ENCOUNTER — HOME CARE VISIT (OUTPATIENT)
Dept: HOME HEALTH SERVICES | Facility: HOME HEALTH | Age: 78
End: 2018-07-31
Payer: MEDICARE

## 2018-07-31 VITALS
RESPIRATION RATE: 18 BRPM | TEMPERATURE: 97.9 F | DIASTOLIC BLOOD PRESSURE: 62 MMHG | SYSTOLIC BLOOD PRESSURE: 116 MMHG | HEART RATE: 90 BPM | OXYGEN SATURATION: 98 %

## 2018-07-31 PROCEDURE — 3331090001 HH PPS REVENUE CREDIT

## 2018-07-31 PROCEDURE — 3331090002 HH PPS REVENUE DEBIT

## 2018-07-31 PROCEDURE — G0299 HHS/HOSPICE OF RN EA 15 MIN: HCPCS

## 2018-07-31 PROCEDURE — G0151 HHCP-SERV OF PT,EA 15 MIN: HCPCS

## 2018-08-01 ENCOUNTER — TELEPHONE (OUTPATIENT)
Dept: HOME HEALTH SERVICES | Facility: HOME HEALTH | Age: 78
End: 2018-08-01

## 2018-08-01 VITALS
OXYGEN SATURATION: 98 % | RESPIRATION RATE: 18 BRPM | SYSTOLIC BLOOD PRESSURE: 138 MMHG | TEMPERATURE: 97.4 F | DIASTOLIC BLOOD PRESSURE: 80 MMHG | HEART RATE: 72 BPM

## 2018-08-01 PROCEDURE — 3331090002 HH PPS REVENUE DEBIT

## 2018-08-01 PROCEDURE — 3331090001 HH PPS REVENUE CREDIT

## 2018-08-01 NOTE — TELEPHONE ENCOUNTER
[address] [place] [address] Lucius Miles Pharmacist consult. I spoke with Mrs. Rivera and explained my part of the [facility] team.  I reviewed her discharge medications. She did not get the Flonase nasal spray filled because she did not have any nose issues. She did not get the ranitidine because she takes DESHAWN 2 tid. She did not get the Humibid DM filled because she is allergic to the dextromethorphan. She will complete her two antibiotics this afternoon. She has 22 allergies listed and we reviewed them also. She will be seeing Dr. Sierra Pereira tomorrow. I gave her my cell phone number and asked her to call with any medication questions. She said I could call back any time.

## 2018-08-02 ENCOUNTER — HOME CARE VISIT (OUTPATIENT)
Dept: SCHEDULING | Facility: HOME HEALTH | Age: 78
End: 2018-08-02
Payer: MEDICARE

## 2018-08-02 ENCOUNTER — TELEPHONE (OUTPATIENT)
Dept: HOME HEALTH SERVICES | Facility: HOME HEALTH | Age: 78
End: 2018-08-02

## 2018-08-02 VITALS
SYSTOLIC BLOOD PRESSURE: 132 MMHG | DIASTOLIC BLOOD PRESSURE: 70 MMHG | OXYGEN SATURATION: 99 % | RESPIRATION RATE: 18 BRPM | HEART RATE: 70 BPM | TEMPERATURE: 98.2 F

## 2018-08-02 PROCEDURE — G0299 HHS/HOSPICE OF RN EA 15 MIN: HCPCS

## 2018-08-02 PROCEDURE — 3331090002 HH PPS REVENUE DEBIT

## 2018-08-02 PROCEDURE — 3331090001 HH PPS REVENUE CREDIT

## 2018-08-02 NOTE — TELEPHONE ENCOUNTER
I called to get more information about her allergy to dextromethorphan. It is not  Listed in CC, so I wanted to add it. I left a voice message to call me back.

## 2018-08-02 NOTE — TELEPHONE ENCOUNTER
Reena Lemos called me back. She said the reaction to the dextromethorphan was a rash. I added this allergy to her CC list.  She saw Dr. Estefania Greer this morning. He said some crackles in her lungs, but about the same as usual.  She is to go back in 3 month, so PNA must be over. No new medication ordered. I asked her to call me with any medication issues.

## 2018-08-03 ENCOUNTER — HOME CARE VISIT (OUTPATIENT)
Dept: SCHEDULING | Facility: HOME HEALTH | Age: 78
End: 2018-08-03
Payer: MEDICARE

## 2018-08-03 PROCEDURE — 3331090001 HH PPS REVENUE CREDIT

## 2018-08-03 PROCEDURE — 3331090002 HH PPS REVENUE DEBIT

## 2018-08-04 PROCEDURE — 3331090002 HH PPS REVENUE DEBIT

## 2018-08-04 PROCEDURE — 3331090001 HH PPS REVENUE CREDIT

## 2018-08-05 PROCEDURE — 3331090002 HH PPS REVENUE DEBIT

## 2018-08-05 PROCEDURE — 3331090001 HH PPS REVENUE CREDIT

## 2018-08-05 NOTE — PROGRESS NOTES
Please call her, she was recently at [facility] with pneumonia. Please see if she is feeling better, does she need to see me sooner than the end of August as planned?   Thanks

## 2018-08-06 PROCEDURE — 3331090001 HH PPS REVENUE CREDIT

## 2018-08-06 PROCEDURE — 3331090002 HH PPS REVENUE DEBIT

## 2018-08-07 ENCOUNTER — HOME CARE VISIT (OUTPATIENT)
Dept: SCHEDULING | Facility: HOME HEALTH | Age: 78
End: 2018-08-07
Payer: MEDICARE

## 2018-08-07 PROCEDURE — 3331090002 HH PPS REVENUE DEBIT

## 2018-08-07 PROCEDURE — G0157 HHC PT ASSISTANT EA 15: HCPCS

## 2018-08-07 PROCEDURE — 3331090001 HH PPS REVENUE CREDIT

## 2018-08-08 PROCEDURE — 3331090001 HH PPS REVENUE CREDIT

## 2018-08-08 PROCEDURE — 3331090002 HH PPS REVENUE DEBIT

## 2018-08-08 NOTE — PROGRESS NOTES
Dear Dr. Acacia Lira: This letter is to inform you, that your patient Nunu Pierre, , has been recently discharged from our facility after being admitted with Pneumonia. The patient is voluntarily taking part in our 30 Day Transitions of Care Wellness Program.  This program includes a Health , Paramedic, that will provide in-home visits 24-48 hours post discharge excluding weekends and holidays. Future visits will be determined based on the initial physical assessment. The Health  will assess patients needs including home environment, medications, disease specific education, DME, follow up appointments, and transportation. Please feel free to contact me or the HelpLine # [phone] m-f [phone], Brook Rousseau or Allison Maddox, Health , with any questions.          Sincerely,    Susi Palmer RN-BC, BSN  Transitional Care Navigator  [address]  [phone]  [email or web address]

## 2018-08-09 ENCOUNTER — HOME CARE VISIT (OUTPATIENT)
Dept: SCHEDULING | Facility: HOME HEALTH | Age: 78
End: 2018-08-09
Payer: MEDICARE

## 2018-08-09 VITALS
TEMPERATURE: 97.3 F | SYSTOLIC BLOOD PRESSURE: 131 MMHG | HEART RATE: 83 BPM | RESPIRATION RATE: 18 BRPM | DIASTOLIC BLOOD PRESSURE: 83 MMHG

## 2018-08-09 PROCEDURE — G0299 HHS/HOSPICE OF RN EA 15 MIN: HCPCS

## 2018-08-09 PROCEDURE — 3331090002 HH PPS REVENUE DEBIT

## 2018-08-09 PROCEDURE — 3331090001 HH PPS REVENUE CREDIT

## 2018-08-10 ENCOUNTER — PATIENT OUTREACH (OUTPATIENT)
Dept: RESPIRATORY THERAPY | Age: 78
End: 2018-08-10

## 2018-08-10 ENCOUNTER — HOME CARE VISIT (OUTPATIENT)
Dept: SCHEDULING | Facility: HOME HEALTH | Age: 78
End: 2018-08-10
Payer: MEDICARE

## 2018-08-10 VITALS
SYSTOLIC BLOOD PRESSURE: 118 MMHG | RESPIRATION RATE: 16 BRPM | TEMPERATURE: 98.7 F | OXYGEN SATURATION: 100 % | HEIGHT: 61 IN | HEART RATE: 70 BPM | BODY MASS INDEX: 31.06 KG/M2 | DIASTOLIC BLOOD PRESSURE: 64 MMHG | WEIGHT: 164.5 LBS

## 2018-08-10 PROCEDURE — 3331090001 HH PPS REVENUE CREDIT

## 2018-08-10 PROCEDURE — 3331090002 HH PPS REVENUE DEBIT

## 2018-08-10 PROCEDURE — G0157 HHC PT ASSISTANT EA 15: HCPCS

## 2018-08-10 NOTE — PROGRESS NOTES
Home Visit #2  Health  arrived at residence of Pneumonia patient. Patient continues to wear 02 @ 2.5 lpm but is in no distress. Patient has been discharged from other services. HC will continue to follow this patient for the duration of the program. Patient had an upset stomach yesterday but is fine today. Patient has no other complaint. Safety Concerns:None    DME:No Issues    Follow Up Appointments:None this week    Transportation:Self/family    Medications: All on hand and patient is compliant. Education: COPD action plan and early intervention    Patient/Family Concerns:None    Tasks:None    Closing Comments:Physical Assessment completed and noted on assessment Form. COPD action plan reviewed. Help line discussed. End of Visit.  David Kumar, Paramedic, Health

## 2018-08-11 PROCEDURE — 3331090001 HH PPS REVENUE CREDIT

## 2018-08-11 PROCEDURE — 3331090002 HH PPS REVENUE DEBIT

## 2018-08-12 VITALS
DIASTOLIC BLOOD PRESSURE: 76 MMHG | HEART RATE: 84 BPM | SYSTOLIC BLOOD PRESSURE: 132 MMHG | OXYGEN SATURATION: 99 % | RESPIRATION RATE: 18 BRPM | TEMPERATURE: 98.2 F

## 2018-08-12 PROCEDURE — 3331090002 HH PPS REVENUE DEBIT

## 2018-08-12 PROCEDURE — 3331090001 HH PPS REVENUE CREDIT

## 2018-08-13 ENCOUNTER — HOME CARE VISIT (OUTPATIENT)
Dept: SCHEDULING | Facility: HOME HEALTH | Age: 78
End: 2018-08-13
Payer: MEDICARE

## 2018-08-13 VITALS
SYSTOLIC BLOOD PRESSURE: 134 MMHG | RESPIRATION RATE: 18 BRPM | HEART RATE: 70 BPM | OXYGEN SATURATION: 98 % | TEMPERATURE: 97.6 F | DIASTOLIC BLOOD PRESSURE: 78 MMHG

## 2018-08-13 VITALS
TEMPERATURE: 97.2 F | DIASTOLIC BLOOD PRESSURE: 78 MMHG | RESPIRATION RATE: 18 BRPM | HEART RATE: 82 BPM | SYSTOLIC BLOOD PRESSURE: 132 MMHG

## 2018-08-13 PROCEDURE — G0151 HHCP-SERV OF PT,EA 15 MIN: HCPCS

## 2018-08-13 PROCEDURE — 3331090001 HH PPS REVENUE CREDIT

## 2018-08-13 PROCEDURE — 3331090002 HH PPS REVENUE DEBIT

## 2018-08-17 ENCOUNTER — PATIENT OUTREACH (OUTPATIENT)
Dept: CASE MANAGEMENT | Age: 78
End: 2018-08-17

## 2018-08-17 NOTE — PROGRESS NOTES
[place] calls this patient to discuss if she needs a home visit or a telephone follow up for today. Pt states that she is doing wonderful and thought that she was already discharged from our Program. Explained to patient that we follow for 30 days. Pt states that she has learned that she is a shallow breather and that she needs to practice deep breathing. She has learned about Pneumonia and how to take care of herself. Pt states that all other practices [facility]) have discharged her as well. Pt denies any SOB, coughing, wheezing, mucus, fatigue, or fevers. Pt is aware of signs and symptoms to look for, for early intervention. Pt also reminded to call the helpline if she needs a home visit or has any questions, complaints or concerns. Pt has all prescribed medications at this time. End of call. Aj Chavez, Paramedic, Health .

## 2018-08-23 ENCOUNTER — PATIENT OUTREACH (OUTPATIENT)
Dept: CASE MANAGEMENT | Age: 78
End: 2018-08-23

## 2018-08-23 NOTE — PROGRESS NOTES
Final Telephone follow up with patient at her 27 Day Transition of Care Wellness Program ends 8/26/2018. Pt states that she is doing very well and being mindful of her activities and being out in the public. Pt states that she has a follow up with Cardiology next Friday. Pt has all prescribed medications, no new medications at this time. Pt reminded to call the help line for any questions or concerns but this will be the final follow up call to her Pneumonia. Taya Mujica, Paramedic, Health .

## 2018-11-14 ENCOUNTER — APPOINTMENT (OUTPATIENT)
Dept: PHYSICAL THERAPY | Age: 78
End: 2018-11-14

## 2018-11-30 PROBLEM — I42.0 DILATED CARDIOMYOPATHY (HCC): Status: ACTIVE | Noted: 2018-11-30

## 2018-11-30 PROBLEM — I50.32 DIASTOLIC CHF, CHRONIC (HCC): Status: ACTIVE | Noted: 2018-11-30

## 2019-03-07 ENCOUNTER — HOSPITAL ENCOUNTER (OUTPATIENT)
Dept: LAB | Age: 79
Discharge: HOME | End: 2019-03-07
Payer: MEDICARE

## 2019-03-07 DIAGNOSIS — I27.20 PULMONARY HYPERTENSION (HCC): ICD-10-CM

## 2019-03-07 LAB
ANION GAP SERPL CALC-SCNC: 9 MMOL/L
BUN SERPL-MCNC: 14 MG/DL (ref 8–23)
CALCIUM SERPL-MCNC: 9.2 MG/DL (ref 8.3–10.4)
CHLORIDE SERPL-SCNC: 93 MMOL/L (ref 98–107)
CO2 SERPL-SCNC: 28 MMOL/L (ref 21–32)
CREAT SERPL-MCNC: 0.9 MG/DL (ref 0.6–1)
GLUCOSE SERPL-MCNC: 93 MG/DL (ref 65–100)
POTASSIUM SERPL-SCNC: 4.1 MMOL/L (ref 3.5–5.1)
SODIUM SERPL-SCNC: 130 MMOL/L (ref 136–145)

## 2019-03-07 PROCEDURE — 80048 BASIC METABOLIC PNL TOTAL CA: CPT

## 2019-03-07 PROCEDURE — 36415 COLL VENOUS BLD VENIPUNCTURE: CPT

## 2019-03-10 NOTE — PROGRESS NOTES
Please call her. How is she doing back on the dilt 120? Any issues? Also, K was fine on the labs, good news! But, Na was lower than expected, need to re-check BMP later this week and we will call her.  Thanks

## 2019-03-13 ENCOUNTER — HOSPITAL ENCOUNTER (OUTPATIENT)
Dept: LAB | Age: 79
Discharge: HOME | End: 2019-03-13
Payer: MEDICARE

## 2019-03-13 DIAGNOSIS — Z79.899 LONG-TERM USE OF HIGH-RISK MEDICATION: ICD-10-CM

## 2019-03-13 DIAGNOSIS — E87.1 HYPONATREMIA: Chronic | ICD-10-CM

## 2019-03-13 DIAGNOSIS — I50.32 DIASTOLIC CHF, CHRONIC (HCC): ICD-10-CM

## 2019-03-13 DIAGNOSIS — I10 ESSENTIAL HYPERTENSION: Chronic | ICD-10-CM

## 2019-03-13 DIAGNOSIS — I42.0 DILATED CARDIOMYOPATHY (HCC): ICD-10-CM

## 2019-03-13 LAB
ANION GAP SERPL CALC-SCNC: 7 MMOL/L
BUN SERPL-MCNC: 13 MG/DL (ref 8–23)
CALCIUM SERPL-MCNC: 9 MG/DL (ref 8.3–10.4)
CHLORIDE SERPL-SCNC: 99 MMOL/L (ref 98–107)
CO2 SERPL-SCNC: 27 MMOL/L (ref 21–32)
CREAT SERPL-MCNC: 1 MG/DL (ref 0.6–1)
GLUCOSE SERPL-MCNC: 86 MG/DL (ref 65–100)
POTASSIUM SERPL-SCNC: 3.8 MMOL/L (ref 3.5–5.1)
SODIUM SERPL-SCNC: 133 MMOL/L (ref 136–145)

## 2019-03-13 PROCEDURE — 36415 COLL VENOUS BLD VENIPUNCTURE: CPT

## 2019-03-13 PROCEDURE — 80048 BASIC METABOLIC PNL TOTAL CA: CPT

## 2019-03-13 NOTE — PROGRESS NOTES
Please call her, Na is better. Good news. Will see back as planned. Doing ok on dilt so far?   Thanks

## 2019-07-30 ENCOUNTER — HOSPITAL ENCOUNTER (OUTPATIENT)
Dept: LAB | Age: 79
Discharge: HOME | End: 2019-07-30
Payer: MEDICARE

## 2019-07-30 DIAGNOSIS — R19.7 DIARRHEA OF PRESUMED INFECTIOUS ORIGIN: ICD-10-CM

## 2019-07-30 LAB
ANION GAP SERPL CALC-SCNC: 11 MMOL/L (ref 7–16)
BASOPHILS # BLD: 0.1 K/UL (ref 0–0.2)
BASOPHILS NFR BLD: 1 % (ref 0–2)
BUN SERPL-MCNC: 15 MG/DL (ref 8–23)
CALCIUM SERPL-MCNC: 9.9 MG/DL (ref 8.3–10.4)
CHLORIDE SERPL-SCNC: 99 MMOL/L (ref 98–107)
CO2 SERPL-SCNC: 28 MMOL/L (ref 21–32)
CREAT SERPL-MCNC: 1 MG/DL (ref 0.6–1)
DIFFERENTIAL METHOD BLD: ABNORMAL
EOSINOPHIL # BLD: 0.2 K/UL (ref 0–0.8)
EOSINOPHIL NFR BLD: 2 % (ref 0.5–7.8)
ERYTHROCYTE [DISTWIDTH] IN BLOOD BY AUTOMATED COUNT: 13.4 % (ref 11.9–14.6)
GLUCOSE SERPL-MCNC: 101 MG/DL (ref 65–100)
HCT VFR BLD AUTO: 39.3 % (ref 35.8–46.3)
HGB BLD-MCNC: 12.5 G/DL (ref 11.7–15.4)
IMM GRANULOCYTES # BLD AUTO: 0 K/UL (ref 0–0.5)
IMM GRANULOCYTES NFR BLD AUTO: 0 % (ref 0–5)
LYMPHOCYTES # BLD: 1.8 K/UL (ref 0.5–4.6)
LYMPHOCYTES NFR BLD: 20 % (ref 13–44)
MAGNESIUM SERPL-MCNC: 1.9 MG/DL (ref 1.8–2.4)
MCH RBC QN AUTO: 31 PG (ref 26.1–32.9)
MCHC RBC AUTO-ENTMCNC: 31.8 G/DL (ref 31.4–35)
MCV RBC AUTO: 97.5 FL (ref 79.6–97.8)
MONOCYTES # BLD: 1 K/UL (ref 0.1–1.3)
MONOCYTES NFR BLD: 11 % (ref 4–12)
NEUTS SEG # BLD: 6 K/UL (ref 1.7–8.2)
NEUTS SEG NFR BLD: 66 % (ref 43–78)
NRBC # BLD: 0 K/UL (ref 0–0.2)
PLATELET # BLD AUTO: 295 K/UL (ref 150–450)
PMV BLD AUTO: 8.6 FL (ref 9.4–12.3)
POTASSIUM SERPL-SCNC: 4.1 MMOL/L (ref 3.5–5.1)
RBC # BLD AUTO: 4.03 M/UL (ref 4.05–5.2)
SODIUM SERPL-SCNC: 138 MMOL/L (ref 136–145)
WBC # BLD AUTO: 9.1 K/UL (ref 4.3–11.1)

## 2019-07-30 PROCEDURE — 83735 ASSAY OF MAGNESIUM: CPT

## 2019-07-30 PROCEDURE — 85025 COMPLETE CBC W/AUTO DIFF WBC: CPT

## 2019-07-30 PROCEDURE — 36415 COLL VENOUS BLD VENIPUNCTURE: CPT

## 2019-07-30 PROCEDURE — 80048 BASIC METABOLIC PNL TOTAL CA: CPT

## 2019-08-13 ENCOUNTER — HOSPITAL ENCOUNTER (OUTPATIENT)
Dept: MAMMOGRAPHY | Age: 79
Discharge: HOME | End: 2019-08-13
Attending: INTERNAL MEDICINE

## 2019-08-13 DIAGNOSIS — Z12.39 SCREENING BREAST EXAMINATION: ICD-10-CM

## 2021-03-01 ENCOUNTER — HOSPITAL ENCOUNTER (OUTPATIENT)
Dept: ULTRASOUND IMAGING | Age: 81
Discharge: HOME | End: 2021-03-01
Attending: INTERNAL MEDICINE

## 2021-03-01 DIAGNOSIS — I65.23 CAROTID ATHEROSCLEROSIS, BILATERAL: ICD-10-CM

## 2021-06-16 PROBLEM — J45.909 MILD REACTIVE AIRWAYS DISEASE: Status: ACTIVE | Noted: 2018-11-28

## 2021-06-16 PROBLEM — R55 POSTURAL DIZZINESS WITH PRESYNCOPE: Status: ACTIVE | Noted: 2019-07-16

## 2021-06-16 PROBLEM — R42 POSTURAL DIZZINESS WITH PRESYNCOPE: Status: ACTIVE | Noted: 2019-07-16

## 2021-06-16 PROBLEM — J96.11 CHRONIC RESPIRATORY FAILURE WITH HYPOXIA (HCC): Status: ACTIVE | Noted: 2019-04-08

## 2021-06-25 ENCOUNTER — HOSPITAL ENCOUNTER (OUTPATIENT)
Dept: MAMMOGRAPHY | Age: 81
Discharge: HOME | End: 2021-06-25
Attending: INTERNAL MEDICINE
Payer: MEDICARE

## 2021-06-25 DIAGNOSIS — R59.0 LYMPHADENOPATHY, AXILLARY: ICD-10-CM

## 2021-06-25 PROCEDURE — 77066 DX MAMMO INCL CAD BI: CPT

## 2021-06-25 PROCEDURE — 76882 US LMTD JT/FCL EVL NVASC XTR: CPT

## 2021-06-28 NOTE — PROGRESS NOTES
Can you please let he patient know her breast ultrasound and mammogram were fine? No concerning lymph nodes. Thank you.

## 2021-08-04 ENCOUNTER — HOSPITAL ENCOUNTER (OUTPATIENT)
Dept: LAB | Age: 81
Discharge: HOME | End: 2021-08-04
Payer: MEDICARE

## 2021-08-04 DIAGNOSIS — R10.32 LEFT LOWER QUADRANT ABDOMINAL PAIN: ICD-10-CM

## 2021-08-04 LAB
ANION GAP SERPL CALC-SCNC: 4 MMOL/L (ref 7–16)
BASOPHILS # BLD: 0 K/UL (ref 0–0.2)
BASOPHILS NFR BLD: 1 % (ref 0–2)
BUN SERPL-MCNC: 24 MG/DL (ref 8–23)
CALCIUM SERPL-MCNC: 9.9 MG/DL (ref 8.3–10.4)
CHLORIDE SERPL-SCNC: 100 MMOL/L (ref 98–107)
CO2 SERPL-SCNC: 29 MMOL/L (ref 21–32)
CREAT SERPL-MCNC: 1.27 MG/DL (ref 0.6–1)
DIFFERENTIAL METHOD BLD: NORMAL
EOSINOPHIL # BLD: 0.2 K/UL (ref 0–0.8)
EOSINOPHIL NFR BLD: 3 % (ref 0.5–7.8)
ERYTHROCYTE [DISTWIDTH] IN BLOOD BY AUTOMATED COUNT: 13.8 % (ref 11.9–14.6)
GLUCOSE SERPL-MCNC: 89 MG/DL (ref 65–100)
HCT VFR BLD AUTO: 45.5 % (ref 35.8–46.3)
HGB BLD-MCNC: 14.4 G/DL (ref 11.7–15.4)
IMM GRANULOCYTES # BLD AUTO: 0 K/UL (ref 0–0.5)
IMM GRANULOCYTES NFR BLD AUTO: 0 % (ref 0–5)
LYMPHOCYTES # BLD: 2 K/UL (ref 0.5–4.6)
LYMPHOCYTES NFR BLD: 26 % (ref 13–44)
MCH RBC QN AUTO: 30.4 PG (ref 26.1–32.9)
MCHC RBC AUTO-ENTMCNC: 31.6 G/DL (ref 31.4–35)
MCV RBC AUTO: 96 FL (ref 79.6–97.8)
MONOCYTES # BLD: 0.8 K/UL (ref 0.1–1.3)
MONOCYTES NFR BLD: 10 % (ref 4–12)
NEUTS SEG # BLD: 4.7 K/UL (ref 1.7–8.2)
NEUTS SEG NFR BLD: 60 % (ref 43–78)
NRBC # BLD: 0 K/UL (ref 0–0.2)
PLATELET # BLD AUTO: 305 K/UL (ref 150–450)
PMV BLD AUTO: 9.9 FL (ref 9.4–12.3)
POTASSIUM SERPL-SCNC: 4.4 MMOL/L (ref 3.5–5.1)
RBC # BLD AUTO: 4.74 M/UL (ref 4.05–5.2)
SODIUM SERPL-SCNC: 133 MMOL/L (ref 136–145)
WBC # BLD AUTO: 7.8 K/UL (ref 4.3–11.1)

## 2021-08-04 PROCEDURE — 85025 COMPLETE CBC W/AUTO DIFF WBC: CPT

## 2021-08-04 PROCEDURE — 80048 BASIC METABOLIC PNL TOTAL CA: CPT

## 2021-08-04 PROCEDURE — 36415 COLL VENOUS BLD VENIPUNCTURE: CPT

## 2021-08-06 ENCOUNTER — HOSPITAL ENCOUNTER (OUTPATIENT)
Dept: CT IMAGING | Age: 81
Discharge: HOME | End: 2021-08-06
Attending: NURSE PRACTITIONER
Payer: MEDICARE

## 2021-08-06 DIAGNOSIS — R10.32 LEFT LOWER QUADRANT ABDOMINAL PAIN: ICD-10-CM

## 2021-08-06 DIAGNOSIS — R30.9 PAINFUL URINATION: ICD-10-CM

## 2021-08-06 DIAGNOSIS — Z91.041 ALLERGY TO IODINATED CONTRAST MEDIA: ICD-10-CM

## 2021-08-06 PROCEDURE — 74177 CT ABD & PELVIS W/CONTRAST: CPT

## 2021-08-06 PROCEDURE — 74011000636 HC RX REV CODE- 636: Performed by: NURSE PRACTITIONER

## 2021-08-06 PROCEDURE — 74011000258 HC RX REV CODE- 258: Performed by: NURSE PRACTITIONER

## 2021-08-06 RX ORDER — SODIUM CHLORIDE 0.9 % (FLUSH) 0.9 %
10 SYRINGE (ML) INJECTION
Status: COMPLETED | OUTPATIENT
Start: 2021-08-06 | End: 2021-08-06

## 2021-08-06 RX ADMIN — DIATRIZOATE MEGLUMINE AND DIATRIZOATE SODIUM 15 ML: 600; 100 SOLUTION ORAL; RECTAL at 16:00

## 2021-08-06 RX ADMIN — IOPAMIDOL 100 ML: 755 INJECTION, SOLUTION INTRAVENOUS at 15:27

## 2021-08-06 RX ADMIN — Medication 10 ML: at 16:00

## 2021-08-06 RX ADMIN — SODIUM CHLORIDE 100 ML: 900 INJECTION, SOLUTION INTRAVENOUS at 16:00

## 2021-09-17 ENCOUNTER — HOSPITAL ENCOUNTER (OUTPATIENT)
Dept: LAB | Age: 81
Discharge: HOME | End: 2021-09-17
Attending: UROLOGY
Payer: MEDICARE

## 2021-09-22 ENCOUNTER — ANESTHESIA EVENT (OUTPATIENT)
Dept: SURGERY | Age: 81
End: 2021-09-22
Payer: MEDICARE

## 2021-09-23 ENCOUNTER — ANESTHESIA (OUTPATIENT)
Dept: SURGERY | Age: 81
End: 2021-09-23
Payer: MEDICARE

## 2021-09-23 ENCOUNTER — HOSPITAL ENCOUNTER (OUTPATIENT)
Age: 81
Setting detail: OUTPATIENT SURGERY
Discharge: HOME | End: 2021-09-23
Attending: UROLOGY | Admitting: UROLOGY
Payer: MEDICARE

## 2021-09-23 VITALS
HEART RATE: 58 BPM | RESPIRATION RATE: 16 BRPM | OXYGEN SATURATION: 100 % | BODY MASS INDEX: 31.65 KG/M2 | DIASTOLIC BLOOD PRESSURE: 80 MMHG | HEIGHT: 62 IN | TEMPERATURE: 97.5 F | SYSTOLIC BLOOD PRESSURE: 155 MMHG | WEIGHT: 172 LBS

## 2021-09-23 DIAGNOSIS — N32.9 LESION OF BLADDER: Primary | ICD-10-CM

## 2021-09-23 PROCEDURE — 74011250636 HC RX REV CODE- 250/636: Performed by: ANESTHESIOLOGY

## 2021-09-23 PROCEDURE — 76210000006 HC OR PH I REC 0.5 TO 1 HR: Performed by: UROLOGY

## 2021-09-23 PROCEDURE — 76210000020 HC REC RM PH II FIRST 0.5 HR: Performed by: UROLOGY

## 2021-09-23 PROCEDURE — 77030019927 HC TBNG IRR CYSTO BAXT -A: Performed by: UROLOGY

## 2021-09-23 PROCEDURE — 76060000032 HC ANESTHESIA 0.5 TO 1 HR: Performed by: UROLOGY

## 2021-09-23 PROCEDURE — 74011000250 HC RX REV CODE- 250: Performed by: REGISTERED NURSE

## 2021-09-23 PROCEDURE — 2709999900 HC NON-CHARGEABLE SUPPLY: Performed by: UROLOGY

## 2021-09-23 PROCEDURE — 77030040361 HC SLV COMPR DVT MDII -B: Performed by: UROLOGY

## 2021-09-23 PROCEDURE — 76010000138 HC OR TIME 0.5 TO 1 HR: Performed by: UROLOGY

## 2021-09-23 PROCEDURE — C1758 CATHETER, URETERAL: HCPCS | Performed by: UROLOGY

## 2021-09-23 PROCEDURE — 74011000636 HC RX REV CODE- 636: Performed by: UROLOGY

## 2021-09-23 PROCEDURE — 74011250636 HC RX REV CODE- 250/636: Performed by: REGISTERED NURSE

## 2021-09-23 PROCEDURE — 74420 UROGRAPHY RTRGR +-KUB: CPT | Performed by: UROLOGY

## 2021-09-23 PROCEDURE — 74011250636 HC RX REV CODE- 250/636: Performed by: UROLOGY

## 2021-09-23 PROCEDURE — 88305 TISSUE EXAM BY PATHOLOGIST: CPT

## 2021-09-23 PROCEDURE — 77030010509 HC AIRWY LMA MSK TELE -A: Performed by: ANESTHESIOLOGY

## 2021-09-23 PROCEDURE — 52224 CYSTOSCOPY AND TREATMENT: CPT | Performed by: UROLOGY

## 2021-09-23 RX ORDER — FENTANYL CITRATE 50 UG/ML
100 INJECTION, SOLUTION INTRAMUSCULAR; INTRAVENOUS ONCE
Status: DISCONTINUED | OUTPATIENT
Start: 2021-09-23 | End: 2021-09-23 | Stop reason: HOSPADM

## 2021-09-23 RX ORDER — SODIUM CHLORIDE, SODIUM LACTATE, POTASSIUM CHLORIDE, CALCIUM CHLORIDE 600; 310; 30; 20 MG/100ML; MG/100ML; MG/100ML; MG/100ML
100 INJECTION, SOLUTION INTRAVENOUS CONTINUOUS
Status: DISCONTINUED | OUTPATIENT
Start: 2021-09-23 | End: 2021-09-23 | Stop reason: HOSPADM

## 2021-09-23 RX ORDER — OXYCODONE HYDROCHLORIDE 5 MG/1
5 TABLET ORAL
Status: DISCONTINUED | OUTPATIENT
Start: 2021-09-23 | End: 2021-09-23 | Stop reason: HOSPADM

## 2021-09-23 RX ORDER — NALOXONE HYDROCHLORIDE 0.4 MG/ML
0.04 INJECTION, SOLUTION INTRAMUSCULAR; INTRAVENOUS; SUBCUTANEOUS
Status: DISCONTINUED | OUTPATIENT
Start: 2021-09-23 | End: 2021-09-23 | Stop reason: HOSPADM

## 2021-09-23 RX ORDER — LIDOCAINE HYDROCHLORIDE 20 MG/ML
INJECTION, SOLUTION EPIDURAL; INFILTRATION; INTRACAUDAL; PERINEURAL AS NEEDED
Status: DISCONTINUED | OUTPATIENT
Start: 2021-09-23 | End: 2021-09-23 | Stop reason: HOSPADM

## 2021-09-23 RX ORDER — MIDAZOLAM HYDROCHLORIDE 1 MG/ML
2 INJECTION, SOLUTION INTRAMUSCULAR; INTRAVENOUS ONCE
Status: DISCONTINUED | OUTPATIENT
Start: 2021-09-23 | End: 2021-09-23 | Stop reason: HOSPADM

## 2021-09-23 RX ORDER — FENTANYL CITRATE 50 UG/ML
INJECTION, SOLUTION INTRAMUSCULAR; INTRAVENOUS AS NEEDED
Status: DISCONTINUED | OUTPATIENT
Start: 2021-09-23 | End: 2021-09-23 | Stop reason: HOSPADM

## 2021-09-23 RX ORDER — PROPOFOL 10 MG/ML
INJECTION, EMULSION INTRAVENOUS AS NEEDED
Status: DISCONTINUED | OUTPATIENT
Start: 2021-09-23 | End: 2021-09-23 | Stop reason: HOSPADM

## 2021-09-23 RX ORDER — LIDOCAINE HYDROCHLORIDE 10 MG/ML
0.1 INJECTION INFILTRATION; PERINEURAL AS NEEDED
Status: DISCONTINUED | OUTPATIENT
Start: 2021-09-23 | End: 2021-09-23 | Stop reason: HOSPADM

## 2021-09-23 RX ORDER — HYDROMORPHONE HYDROCHLORIDE 2 MG/ML
0.5 INJECTION, SOLUTION INTRAMUSCULAR; INTRAVENOUS; SUBCUTANEOUS
Status: DISCONTINUED | OUTPATIENT
Start: 2021-09-23 | End: 2021-09-23 | Stop reason: HOSPADM

## 2021-09-23 RX ORDER — CIPROFLOXACIN 2 MG/ML
400 INJECTION, SOLUTION INTRAVENOUS ONCE
Status: COMPLETED | OUTPATIENT
Start: 2021-09-23 | End: 2021-09-23

## 2021-09-23 RX ORDER — DEXAMETHASONE SODIUM PHOSPHATE 4 MG/ML
INJECTION, SOLUTION INTRA-ARTICULAR; INTRALESIONAL; INTRAMUSCULAR; INTRAVENOUS; SOFT TISSUE AS NEEDED
Status: DISCONTINUED | OUTPATIENT
Start: 2021-09-23 | End: 2021-09-23 | Stop reason: HOSPADM

## 2021-09-23 RX ORDER — MIDAZOLAM HYDROCHLORIDE 1 MG/ML
2 INJECTION, SOLUTION INTRAMUSCULAR; INTRAVENOUS
Status: DISCONTINUED | OUTPATIENT
Start: 2021-09-23 | End: 2021-09-23 | Stop reason: HOSPADM

## 2021-09-23 RX ORDER — HYDROCODONE BITARTRATE AND ACETAMINOPHEN 5; 325 MG/1; MG/1
1 TABLET ORAL
Qty: 10 TABLET | Refills: 0 | Status: SHIPPED | OUTPATIENT
Start: 2021-09-23 | End: 2021-09-26

## 2021-09-23 RX ORDER — ONDANSETRON 2 MG/ML
INJECTION INTRAMUSCULAR; INTRAVENOUS AS NEEDED
Status: DISCONTINUED | OUTPATIENT
Start: 2021-09-23 | End: 2021-09-23 | Stop reason: HOSPADM

## 2021-09-23 RX ADMIN — SODIUM CHLORIDE, SODIUM LACTATE, POTASSIUM CHLORIDE, AND CALCIUM CHLORIDE 100 ML/HR: 600; 310; 30; 20 INJECTION, SOLUTION INTRAVENOUS at 06:16

## 2021-09-23 RX ADMIN — PROPOFOL 200 MG: 10 INJECTION, EMULSION INTRAVENOUS at 08:06

## 2021-09-23 RX ADMIN — LIDOCAINE HYDROCHLORIDE 100 MG: 20 INJECTION, SOLUTION EPIDURAL; INFILTRATION; INTRACAUDAL; PERINEURAL at 08:06

## 2021-09-23 RX ADMIN — DEXAMETHASONE SODIUM PHOSPHATE 10 MG: 4 INJECTION, SOLUTION INTRAMUSCULAR; INTRAVENOUS at 08:14

## 2021-09-23 RX ADMIN — FENTANYL CITRATE 25 MCG: 50 INJECTION INTRAMUSCULAR; INTRAVENOUS at 08:22

## 2021-09-23 RX ADMIN — ONDANSETRON 4 MG: 2 INJECTION INTRAMUSCULAR; INTRAVENOUS at 08:14

## 2021-09-23 RX ADMIN — CIPROFLOXACIN 400 MG: 2 INJECTION, SOLUTION INTRAVENOUS at 08:10

## 2021-09-23 NOTE — PERIOP NOTES
Debrief completed yes    Correct procedure yes    Count completed and verified n/a    Specimen collected and verified yes    Wound classification clean contaminated    Other n/a

## 2021-09-23 NOTE — DISCHARGE INSTRUCTIONS
Cystoscopy: What to Expect at [address] [facility]  A cystoscopy is a procedure that lets a doctor look inside of the bladder and the urethra. The urethra is the tube that carries urine from the bladder to outside the body. The doctor uses a thin, lighted tube called a cystoscope to look for kidney or bladder stones, tumors, bleeding, or infection. After the cystoscopy, your urethra may be sore at first, and it may burn when you urinate for the first few days after the procedure. You may feel the need to urinate more often, and your urine may be pink. These symptoms should get better in 1 or 2 days. You will probably be able to go back to work or most of your usual activities in 1 or 2 days. How can you care for yourself at home? Activity  · Rest when you feel tired. Getting enough sleep will help you recover. · Try to walk each day. Start by walking a little more than you did the day before. Bit by bit, increase the amount you walk. Walking boosts blood flow and helps prevent pneumonia and constipation. · Avoid strenuous activities, such as bicycle riding, jogging, weight lifting, or aerobic exercise, until your doctor says it is okay. · Ask your doctor when you can drive again. · Most people are able to return to work within 1 or 2 days after the procedure. · You may shower and take baths as usual.  · Ask your doctor when it is okay for you to have sex. Diet  · You can eat your normal diet. If your stomach is upset, try bland, low-fat foods like plain rice, broiled chicken, toast, and yogurt. · Drink plenty of fluids (unless your doctor tells you not to). Medicines  · Take pain medicines exactly as directed. ¨ If the doctor gave you a prescription medicine for pain, take it as prescribed. ¨ If you are not taking a prescription pain medicine, ask your doctor if you can take an over-the-counter medicine.   · If you think your pain medicine is making you sick to your stomach:  ¨ Take your medicine after meals (unless your doctor has told you not to). ¨ Ask your doctor for a different pain medicine. · If your doctor prescribed antibiotics, take them as directed. Do not stop taking them just because you feel better. You need to take the full course of antibiotics. Medication Interaction:  During your procedure you potentially received a medication or medications which may reduce the effectiveness of oral contraceptives. Please consider other forms of contraception for 1 month following your procedure if you are currently using oral contraceptives as your primary form of birth control. In addition to this, we recommend continuing your oral contraceptive as prescribed, unless otherwise instructed by your physician, during this time. Follow-up care is a key part of your treatment and safety. Be sure to make and go to all appointments, and call your doctor if you are having problems. It's also a good idea to know your test results and keep a list of the medicines you take. When should you call for help? Call 911 anytime you think you may need emergency care. For example, call if:  · You passed out (lost consciousness). · You have severe trouble breathing. · You have sudden chest pain and shortness of breath, or you cough up blood. · You have severe belly pain. Call your doctor now or seek immediate medical care if:  · You are sick to your stomach or cannot keep fluids down. · Your urine is still red or you see blood clots after you have urinated several times. · You have trouble passing urine or stool, especially if you have pain or swelling in your lower belly. · You have signs of a blood clot, such as:  ¨ Pain in your calf, back of the knee, thigh, or groin. ¨ Redness and swelling in your leg or groin. · You develop a fever or severe chills. · You have pain in your back just below your rib cage. This is called flank pain.   Watch closely for changes in your health, and be sure to contact your doctor if: · A burning feeling is normal for a day or two after the test, but call if it does not get better. · You have a frequent urge to urinate but can pass only small amounts of urine. · It is normal for the urine to have a pinkish color for a few days after the test, but call if it does not get better or if your urine is cloudy, smells bad, or has pus in it. After general anesthesia or intravenous sedation, for 24 hours or while taking prescription Narcotics:  · Limit your activities  · A responsible adult needs to be with you for the next 24 hours  · Do not drive and operate hazardous machinery  · Do not make important personal or business decisions  · Do not drink alcoholic beverages  · If you have not urinated within 8 hours after discharge, and you are experiencing discomfort from urinary retention, please go to the nearest ED. · If you have sleep apnea and have a CPAP machine, please use it for all naps and sleeping. · Please use caution when taking narcotics and any of your home medications that may cause drowsiness. *  Please give a list of your current medications to your Primary Care Provider. *  Please update this list whenever your medications are discontinued, doses are      changed, or new medications (including over-the-counter products) are added. *  Please carry medication information at all times in case of emergency situations. These are general instructions for a healthy lifestyle:  No smoking/ No tobacco products/ Avoid exposure to second hand smoke  Surgeon General's Warning:  Quitting smoking now greatly reduces serious risk to your health.   Obesity, smoking, and sedentary lifestyle greatly increases your risk for illness  A healthy diet, regular physical exercise & weight monitoring are important for maintaining a healthy lifestyle    You may be retaining fluid if you have a history of heart failure or if you experience any of the following symptoms:  Weight gain of 3 pounds or more overnight or 5 pounds in a week, increased swelling in our hands or feet or shortness of breath while lying flat in bed. Please call your doctor as soon as you notice any of these symptoms; do not wait until your next office visit.

## 2021-09-23 NOTE — OP NOTES
[address] [facility]  OPERATIVE REPORT    Name:  Chano Wright  MR#:  [id number]  :  1940  ACCOUNT #:  [de-identified]  DATE OF SERVICE:  2021    PREOPERATIVE DIAGNOSES:  Recurrent urinary tract infections and abnormal bladder urothelium. POSTOPERATIVE DIAGNOSES:  Recurrent urinary tract infections and abnormal bladder urothelium. PROCEDURES PERFORMED:  Cystoscopy, bladder biopsies, fulguration of abnormal bladder urothelium, bilateral retrograde pyelograms. SURGEON:  Fidel Martniez DO    ASSISTANT:  None    ANESTHESIA:  General.    COMPLICATIONS:  None immediate. SPECIMENS REMOVED:  Bladder biopsies. IMPLANTS:  None. ESTIMATED BLOOD LOSS:  Less than 5 mL. CLINICAL HISTORY:  This is an 80-year-old female who I have followed for a longstanding history of recurrent urinary tract infections. Recent cystoscopy revealed raised and injected urothelium at the dome of the bladder. All risks, benefits and alternatives to the above-mentioned procedure have been discussed and she is willing to proceed at this time. PROCEDURE:  Patient consent was obtained. The patient was brought back to the operating room, at which time she was placed in the supine position. After the uneventful induction of general anesthesia, she was then placed in a dorsal lithotomy position. Her genital area was prepped and draped and a sterile field applied. A 22-German cystoscope was inserted into the urethra and advanced into the bladder under direct visualization. The urethra was unremarkable. The bladder was systematically surveyed. A large-capacity bladder was noted with mild cystitis cystica noted scattered throughout the bladder. Single bilateral ureteral orifices were seen in the normal orthotopic position. An approximately 2 cm3 area of injected and raised urothelium was seen over the dome of the bladder. This area was biopsied several times using a cold cup biopsy forceps.   The entire area was then fulgurated using a Bugbee electrode. Bilateral retrograde pyelograms were then performed using a 5-Pakistani cone-tipped catheter. No filling defects or dilation were seen bilaterally. Prompt excretion was noted bilaterally. The patient's bladder was drained and filled several times to ensure hemostasis. No further bleeding was seen. The bladder was drained and the cystoscope was removed. The patient tolerated the procedure well. She will follow up with the nurse practitioner in one month. INTERPRETATION OF BILATERAL RETROGRADE PYELOGRAMS:  Bilateral retrograde pyelograms were performed using a 5-Pakistani cone-tipped catheter. No filling defects or dilation were seen bilaterally. Prompt excretion was noted bilaterally.       JAYLA GIORN DO      [id number]  D:  09/23/2021 8:52  T:  09/23/2021 10:39  JOB #:  [id number]

## 2021-09-23 NOTE — ANESTHESIA PREPROCEDURE EVALUATION
Anesthetic History   No history of anesthetic complications            Review of Systems / Medical History  Patient summary reviewed, nursing notes reviewed and pertinent labs reviewed    Pulmonary                Comments: Pulmonary fibrosis 3L NC prn- pt feels stable, doing well from pulmonary standpoint from her perspective. PHTN   Neuro/Psych   Within defined limits           Cardiovascular    Hypertension: well controlled      CHF (diastolic)  Dysrhythmias (VT ablation 2014)   Past MI (2009, no intervention), CAD and PAD    Exercise tolerance: <4 METS: Limited by SOB    Comments: Unremarkable echo 11/20. Non-obstructive CAD on cath 2018. GI/Hepatic/Renal     GERD: well controlled    Renal disease: CRI  Liver disease     Endo/Other      Hypothyroidism: well controlled  Obesity, blood dyscrasia (ITP with good platelet count for last year) and arthritis    Comments: Sjogren's   Other Findings            Physical Exam    Airway  Mallampati: I  TM Distance: 4 - 6 cm  Neck ROM: normal range of motion   Mouth opening: Normal     Cardiovascular    Rhythm: regular  Rate: normal         Dental    Dentition: Poor dentition  Comments: Several missing, none loose   Pulmonary          Rales:bibasilar       Abdominal         Other Findings            Anesthetic Plan    ASA: 3  Anesthesia type: general          Induction: Intravenous  Anesthetic plan and risks discussed with: Patient      Lengthy discussion regarding GA vs SAB. Pt did well in 2016 for same procedure done under GA with LMA. Pt refusing SAB and appears optimized from pulmonary standpoint.

## 2021-09-23 NOTE — ANESTHESIA POSTPROCEDURE EVALUATION
Procedure(s):  CYSTOSCOPY/ BLADDER BIOPSIES / FULGARATION/ BLATERAL RETROGRADE PYELOGRAMS. general    Anesthesia Post Evaluation        Patient location during evaluation: PACU  Patient participation: complete - patient participated  Level of consciousness: awake  Pain management: satisfactory to patient  Airway patency: patent  Anesthetic complications: no  Cardiovascular status: hemodynamically stable  Respiratory status: spontaneous ventilation  Hydration status: euvolemic  Post anesthesia nausea and vomiting:  none      INITIAL Post-op Vital signs:   Vitals Value Taken Time   /80 09/23/21 0921   Temp 36.4 °C (97.5 °F) 09/23/21 0918   Pulse 57 09/23/21 0922   Resp 28 09/23/21 0922   SpO2 100 % 09/23/21 0922   Vitals shown include unvalidated device data.

## 2021-09-23 NOTE — PERIOP NOTES
9:10 AM  Verbal discharge instructions given to Keren Baptiste, pt's significant other. He verbalized understanding and did not voice additional questions or concerns.

## 2021-09-23 NOTE — BRIEF OP NOTE
Brief Postoperative Note    Patient: Mikayla Penn  YOB: 1940  MRN: [id number]    Date of Procedure: 9/23/2021     Pre-Op Diagnosis: Abnormal bladder urothelium    Post-Op Diagnosis: Same    Procedure(s):  CYSTOSCOPY/ BLADDER BIOPSIES / FULGARATION/ BLATERAL RETROGRADE PYELOGRAMS    Surgeon(s):  Lamar Martinez DO    Surgical Assistant: None    Anesthesia: General     Estimated Blood Loss (mL): <6JL    Complications: none    Specimens:   ID Type Source Tests Collected by Time Destination   1 : bladder biopsies Preservative Bladder  Jenna Ferrara DO 9/23/2021 0825 Pathology        Implants: * No implants in log *    Drains: * No LDAs found *    Findings: see op note    Electronically Signed by Donta Schofield DO on 9/23/2021 at 8:45 AM

## 2021-09-24 NOTE — PROGRESS NOTES
LATE ENTRY    Spiritual Care visit. Initial Visit, Pre Surgery Consult. Visit and prayer before patient goes to surgery.     Visit by Natasha Suarez M.Ed., Th.B. ,Staff

## 2021-09-24 NOTE — PROGRESS NOTES
Please let pt know that biopsies were neg for cancer. They showed inflammation only.   Make sure she has appt in one mo with NP.

## 2022-03-18 PROBLEM — M15.0 PRIMARY OSTEOARTHRITIS INVOLVING MULTIPLE JOINTS: Status: ACTIVE | Noted: 2018-01-09

## 2022-03-18 PROBLEM — M15.9 PRIMARY OSTEOARTHRITIS INVOLVING MULTIPLE JOINTS: Status: ACTIVE | Noted: 2018-01-09

## 2022-03-18 PROBLEM — J18.9 LEFT LOWER LOBE PNEUMONIA: Status: ACTIVE | Noted: 2018-07-23

## 2022-03-19 PROBLEM — H10.30 ACUTE CONJUNCTIVITIS: Status: ACTIVE | Noted: 2018-07-23

## 2022-03-19 PROBLEM — I50.32 DIASTOLIC CHF, CHRONIC (HCC): Status: ACTIVE | Noted: 2018-11-30

## 2022-03-19 PROBLEM — M89.8X1 SHOULDER BLADE PAIN: Status: ACTIVE | Noted: 2018-04-19

## 2022-03-19 PROBLEM — R42 POSTURAL DIZZINESS WITH PRESYNCOPE: Status: ACTIVE | Noted: 2019-07-16

## 2022-03-19 PROBLEM — R05.9 COUGH: Status: ACTIVE | Noted: 2017-06-26

## 2022-03-19 PROBLEM — E55.9 HYPOVITAMINOSIS D: Status: ACTIVE | Noted: 2018-01-09

## 2022-03-19 PROBLEM — R79.89 ELEVATED TROPONIN: Status: ACTIVE | Noted: 2018-07-23

## 2022-03-19 PROBLEM — R55 POSTURAL DIZZINESS WITH PRESYNCOPE: Status: ACTIVE | Noted: 2019-07-16

## 2022-03-19 PROBLEM — J45.909 MILD REACTIVE AIRWAYS DISEASE: Status: ACTIVE | Noted: 2018-11-28

## 2022-03-19 PROBLEM — R77.8 ELEVATED TROPONIN: Status: ACTIVE | Noted: 2018-07-23

## 2022-03-19 PROBLEM — I42.0 DILATED CARDIOMYOPATHY (HCC): Status: ACTIVE | Noted: 2018-11-30

## 2022-03-19 PROBLEM — K52.831 COLLAGENOUS COLITIS: Status: ACTIVE | Noted: 2018-01-09

## 2022-03-19 PROBLEM — J18.9 PNEUMONIA: Status: ACTIVE | Noted: 2018-07-23

## 2022-03-20 PROBLEM — J96.11 CHRONIC RESPIRATORY FAILURE WITH HYPOXIA (HCC): Status: ACTIVE | Noted: 2019-04-08

## 2022-05-04 ENCOUNTER — HOSPITAL ENCOUNTER (OUTPATIENT)
Dept: LAB | Age: 82
Discharge: HOME | End: 2022-05-04
Payer: MEDICARE

## 2022-05-04 DIAGNOSIS — I50.32 DIASTOLIC CHF, CHRONIC (HCC): ICD-10-CM

## 2022-05-04 DIAGNOSIS — I49.3 PVC (PREMATURE VENTRICULAR CONTRACTION): ICD-10-CM

## 2022-05-04 LAB
ALBUMIN SERPL-MCNC: 3.8 G/DL (ref 3.2–4.6)
ALBUMIN/GLOB SERPL: 1 {RATIO} (ref 1.2–3.5)
ALP SERPL-CCNC: 64 U/L (ref 50–136)
ALT SERPL-CCNC: 21 U/L (ref 12–65)
ANION GAP SERPL CALC-SCNC: 6 MMOL/L (ref 7–16)
AST SERPL-CCNC: 23 U/L (ref 15–37)
BASOPHILS # BLD: 0.1 K/UL (ref 0–0.2)
BASOPHILS NFR BLD: 1 % (ref 0–2)
BILIRUB SERPL-MCNC: 0.7 MG/DL (ref 0.2–1.1)
BUN SERPL-MCNC: 18 MG/DL (ref 8–23)
CALCIUM SERPL-MCNC: 9.8 MG/DL (ref 8.3–10.4)
CHLORIDE SERPL-SCNC: 96 MMOL/L (ref 98–107)
CO2 SERPL-SCNC: 28 MMOL/L (ref 21–32)
CREAT SERPL-MCNC: 1.1 MG/DL (ref 0.6–1)
DIFFERENTIAL METHOD BLD: NORMAL
EOSINOPHIL # BLD: 0.3 K/UL (ref 0–0.8)
EOSINOPHIL NFR BLD: 4 % (ref 0.5–7.8)
ERYTHROCYTE [DISTWIDTH] IN BLOOD BY AUTOMATED COUNT: 14.6 % (ref 11.9–14.6)
GLOBULIN SER CALC-MCNC: 3.8 G/DL (ref 2.3–3.5)
GLUCOSE SERPL-MCNC: 81 MG/DL (ref 65–100)
HCT VFR BLD AUTO: 43.4 % (ref 35.8–46.3)
HGB BLD-MCNC: 14.1 G/DL (ref 11.7–15.4)
IMM GRANULOCYTES # BLD AUTO: 0 K/UL (ref 0–0.5)
IMM GRANULOCYTES NFR BLD AUTO: 1 % (ref 0–5)
LYMPHOCYTES # BLD: 2.3 K/UL (ref 0.5–4.6)
LYMPHOCYTES NFR BLD: 29 % (ref 13–44)
MCH RBC QN AUTO: 30.9 PG (ref 26.1–32.9)
MCHC RBC AUTO-ENTMCNC: 32.5 G/DL (ref 31.4–35)
MCV RBC AUTO: 95.2 FL (ref 79.6–97.8)
MONOCYTES # BLD: 0.9 K/UL (ref 0.1–1.3)
MONOCYTES NFR BLD: 11 % (ref 4–12)
NEUTS SEG # BLD: 4.3 K/UL (ref 1.7–8.2)
NEUTS SEG NFR BLD: 54 % (ref 43–78)
NRBC # BLD: 0 K/UL (ref 0–0.2)
PLATELET # BLD AUTO: 279 K/UL (ref 150–450)
PMV BLD AUTO: 10.4 FL (ref 9.4–12.3)
POTASSIUM SERPL-SCNC: 4.7 MMOL/L (ref 3.5–5.1)
PROT SERPL-MCNC: 7.6 G/DL (ref 6.3–8.2)
RBC # BLD AUTO: 4.56 M/UL (ref 4.05–5.2)
SODIUM SERPL-SCNC: 130 MMOL/L (ref 136–145)
TSH SERPL DL<=0.005 MIU/L-ACNC: 0.43 UIU/ML (ref 0.36–3.74)
WBC # BLD AUTO: 7.9 K/UL (ref 4.3–11.1)

## 2022-05-04 PROCEDURE — 84443 ASSAY THYROID STIM HORMONE: CPT

## 2022-05-04 PROCEDURE — 36415 COLL VENOUS BLD VENIPUNCTURE: CPT

## 2022-05-04 PROCEDURE — 80053 COMPREHEN METABOLIC PANEL: CPT

## 2022-05-04 PROCEDURE — 85025 COMPLETE CBC W/AUTO DIFF WBC: CPT

## 2022-08-24 ENCOUNTER — OFFICE VISIT (OUTPATIENT)
Dept: UROLOGY | Age: 82
End: 2022-08-24
Payer: MEDICARE

## 2022-08-24 DIAGNOSIS — R30.0 DYSURIA: Primary | ICD-10-CM

## 2022-08-24 DIAGNOSIS — R35.0 URINARY FREQUENCY: ICD-10-CM

## 2022-08-24 LAB
BILIRUBIN, URINE, POC: NEGATIVE
BLOOD URINE, POC: NORMAL
GLUCOSE URINE, POC: NEGATIVE
KETONES, URINE, POC: NEGATIVE
LEUKOCYTE ESTERASE, URINE, POC: NORMAL
NITRITE, URINE, POC: NEGATIVE
PH, URINE, POC: 7 (ref 4.6–8)
PROTEIN,URINE, POC: 30
SPECIFIC GRAVITY, URINE, POC: 1.02 (ref 1–1.03)
URINALYSIS CLARITY, POC: NORMAL
URINALYSIS COLOR, POC: NORMAL
UROBILINOGEN, POC: NORMAL

## 2022-08-24 PROCEDURE — 81003 URINALYSIS AUTO W/O SCOPE: CPT | Performed by: NURSE PRACTITIONER

## 2022-08-24 RX ORDER — TRIMETHOPRIM 100 MG/1
100 TABLET ORAL 2 TIMES DAILY
Qty: 14 TABLET | Refills: 0 | Status: SHIPPED | OUTPATIENT
Start: 2022-08-24 | End: 2022-08-31

## 2022-08-24 NOTE — PROGRESS NOTES
Called and spk with patient-advised today's specimen is being sent for culture and can take up to 72 hrs to result-patient aware an abx has been sent in the meantime until culture results are in and this abx may need to be changed.

## 2022-08-27 LAB
BACTERIA SPEC CULT: ABNORMAL
BACTERIA SPEC CULT: ABNORMAL
SERVICE CMNT-IMP: ABNORMAL

## 2022-09-21 ENCOUNTER — TELEPHONE (OUTPATIENT)
Dept: CARDIOLOGY CLINIC | Age: 82
End: 2022-09-21

## 2022-09-21 DIAGNOSIS — I27.0 PRIMARY PULMONARY HYPERTENSION (HCC): Primary | ICD-10-CM

## 2022-09-21 NOTE — TELEPHONE ENCOUNTER
----- Message from Erlin Collins DO sent at 9/20/2022  2:35 PM EDT -----  Please refer her to Dr. Romel Sterling, Pulmonary, for pulm HTN. Long time patient of mine with PHTN, has been followed by [facility]. Thanks!

## 2022-09-26 DIAGNOSIS — I27.20 PULMONARY HYPERTENSION (HCC): Primary | ICD-10-CM

## 2022-09-26 DIAGNOSIS — N95.1 MENOPAUSE SYNDROME: ICD-10-CM

## 2022-09-26 DIAGNOSIS — Z78.0 ASYMPTOMATIC MENOPAUSAL STATE: Primary | ICD-10-CM

## 2022-09-26 RX ORDER — ESTRADIOL 0.5 MG/1
0.5 TABLET ORAL DAILY
Qty: 90 TABLET | Refills: 0 | Status: SHIPPED | OUTPATIENT
Start: 2022-09-26

## 2022-09-26 RX ORDER — ESTRADIOL 0.5 MG/1
TABLET ORAL
Qty: 90 TABLET | Refills: 1 | OUTPATIENT
Start: 2022-09-26

## 2022-09-27 ENCOUNTER — TELEPHONE (OUTPATIENT)
Dept: CARDIOLOGY CLINIC | Age: 82
End: 2022-09-27

## 2022-09-27 DIAGNOSIS — I27.0 PRIMARY PULMONARY HYPERTENSION (HCC): Primary | ICD-10-CM

## 2022-09-27 DIAGNOSIS — I42.0 DILATED CARDIOMYOPATHY (HCC): ICD-10-CM

## 2022-09-27 NOTE — TELEPHONE ENCOUNTER
----- Message from Victor M Cristina DO sent at 9/26/2022  1:25 PM EDT -----  We need to refer her to Dr. Jitendra Parish at [facility] for PHTN (has been followed by [facility] and changing). Would like seen soon by Dr. Jitendra Parish if possible. Please call their office, see if we can get her seen in next few weeks.   Thanks

## 2022-09-28 NOTE — PROGRESS NOTES
Pt is an 81 yo female with a history of pulmonary hypertension, +CRUZ, Raynaud's disease, asthma, ITP, PVCs, CAD, and obesity. Pt has been followed by Dr. Efraín Gallegos with [facility] [address] Pt is followed by Dr. Param Ayala, cardiologist, and was referred to our office to take over pulmonary hypertension treatment. It appears that she was found to have mild to moderate CAD 4/2015 and found to have RHF and PVCs. She was found to have an RVSP of 42mmHg at that time. In 6/2015, her echo revealed an RVSP of 110mmHg. She had a repeat RHC with PAP of 40, CO/CI 3.4/2.0 with a wedge of 5-6 and was responsive to adenosine. She was started on Letairis, Adcirica, aldactone, and lasix. Her RVSP improved to 13. She had to stop Letairis secondary to elevated LFTs. She is now on tadalafil 40mg daily, salix 20mg daily, and aldactone daily. She is now only using O2 2L qhs. She had previously required O2 with exertion as well. PHTN risk factors:No diet drugs, misscarriages, IVDA, HIV risk factors (no testing), +/- CTD, has had platelets in the past but no blood, No family history of PH or clots, No personal history of clots No COPD, hx of ILD?, GERD    Pt needs to have CPFTs, 6MWT, and labs. I will place orders. Can you have chest Cts that are listed below pushed to PACS? Diagnostic Review:    6MWT 6/9/21   distance 182 meters (51% predicted)   Start;end O2 sat 94%, 88%   Max VETO dyspnea 6   COMMENTS No O2 needed       Right Heart Cath 6/15/15 4/16/15   RA  6   RV  50   PA 70/30 54/7   PCWP 5-6mmHg 5   CO 3.2 3.6   PVR     VASOREACTIVE? TTE 5/5/22 7/17/19 5/8/17 12/15/16 6/1/16 11/10/15 6/12/15 4/8/15   EF 50-55% 50-54% Low normal EF 50% 50-55% 45-50% 50-55% 50%   RV Normal size, thickness, and function RVE.  Mpr,a; fimctopm  RV mildly dilated, RVH RV mildly dilated Normal RV size RV markedly dilated, function reduced Moderately RV enlargement   RVSP Unable to assess 51mmHg 45mmHg Unable to measure, TAPSE 22 40mmHg
Pt scheduled in soonest appts available.
Normal for race

## 2022-10-17 ENCOUNTER — NURSE ONLY (OUTPATIENT)
Dept: PULMONOLOGY | Age: 82
End: 2022-10-17
Payer: MEDICARE

## 2022-10-17 DIAGNOSIS — I27.20 PULMONARY HYPERTENSION (HCC): ICD-10-CM

## 2022-10-17 DIAGNOSIS — R06.09 DYSPNEA ON EXERTION: Primary | ICD-10-CM

## 2022-10-17 LAB
FEF25-27, POC: 0.97 L/S
FET, POC: NORMAL
FEV 1 , POC: 1.45 L
FEV1/FVC, POC: NORMAL
FVC, POC: NORMAL
HCV AB SER QL: NONREACTIVE
HIV 1+2 AB+HIV1 P24 AG SERPL QL IA: NONREACTIVE
HIV 1/2 RESULT COMMENT: NORMAL
LUNG AGE, POC: NORMAL
PEF, POC: 4.82 L/S
TOTAL HEMOGLOBIN (SPHB), POC: 15.1 G/DL

## 2022-10-17 PROCEDURE — 94010 BREATHING CAPACITY TEST: CPT | Performed by: INTERNAL MEDICINE

## 2022-10-17 PROCEDURE — 94729 DIFFUSING CAPACITY: CPT | Performed by: INTERNAL MEDICINE

## 2022-10-17 PROCEDURE — 94726 PLETHYSMOGRAPHY LUNG VOLUMES: CPT | Performed by: INTERNAL MEDICINE

## 2022-10-18 LAB
ANA SER QL: POSITIVE
CENTROMERE B AB SER-ACNC: <0.2 AI (ref 0–0.9)
CHROMATIN AB SERPL-ACNC: <0.2 AI (ref 0–0.9)
DSDNA AB SER-ACNC: 1 IU/ML (ref 0–9)
ENA JO1 AB SER-ACNC: <0.2 AI (ref 0–0.9)
ENA RNP AB SER-ACNC: 2.6 AI (ref 0–0.9)
ENA SCL70 AB SER-ACNC: <0.2 AI (ref 0–0.9)
ENA SM AB SER-ACNC: <0.2 AI (ref 0–0.9)
ENA SS-A AB SER-ACNC: 7.5 AI (ref 0–0.9)
ENA SS-B AB SER-ACNC: <0.2 AI (ref 0–0.9)
Lab: ABNORMAL

## 2022-10-19 DIAGNOSIS — I27.20 PULMONARY HYPERTENSION (HCC): ICD-10-CM

## 2022-10-19 DIAGNOSIS — R76.8 POSITIVE ANA (ANTINUCLEAR ANTIBODY): Primary | ICD-10-CM

## 2022-10-20 ENCOUNTER — OFFICE VISIT (OUTPATIENT)
Dept: PULMONOLOGY | Age: 82
End: 2022-10-20
Payer: MEDICARE

## 2022-10-20 ENCOUNTER — NURSE ONLY (OUTPATIENT)
Dept: PULMONOLOGY | Age: 82
End: 2022-10-20
Payer: MEDICARE

## 2022-10-20 VITALS
TEMPERATURE: 97.1 F | WEIGHT: 163 LBS | HEIGHT: 61 IN | OXYGEN SATURATION: 100 % | SYSTOLIC BLOOD PRESSURE: 162 MMHG | BODY MASS INDEX: 30.78 KG/M2 | HEART RATE: 79 BPM | DIASTOLIC BLOOD PRESSURE: 84 MMHG

## 2022-10-20 DIAGNOSIS — J84.9 ILD (INTERSTITIAL LUNG DISEASE) (HCC): ICD-10-CM

## 2022-10-20 DIAGNOSIS — R06.09 DOE (DYSPNEA ON EXERTION): ICD-10-CM

## 2022-10-20 DIAGNOSIS — I27.20 PULMONARY HYPERTENSION (HCC): Primary | ICD-10-CM

## 2022-10-20 DIAGNOSIS — R76.8 POSITIVE ANA (ANTINUCLEAR ANTIBODY): ICD-10-CM

## 2022-10-20 DIAGNOSIS — I27.0 PRIMARY PULMONARY HYPERTENSION (HCC): ICD-10-CM

## 2022-10-20 PROBLEM — N18.30 CHRONIC RENAL DISEASE, STAGE III (HCC): Status: ACTIVE | Noted: 2022-10-20

## 2022-10-20 PROCEDURE — 1123F ACP DISCUSS/DSCN MKR DOCD: CPT | Performed by: INTERNAL MEDICINE

## 2022-10-20 PROCEDURE — G8417 CALC BMI ABV UP PARAM F/U: HCPCS | Performed by: INTERNAL MEDICINE

## 2022-10-20 PROCEDURE — 1090F PRES/ABSN URINE INCON ASSESS: CPT | Performed by: INTERNAL MEDICINE

## 2022-10-20 PROCEDURE — G8484 FLU IMMUNIZE NO ADMIN: HCPCS | Performed by: INTERNAL MEDICINE

## 2022-10-20 PROCEDURE — G8400 PT W/DXA NO RESULTS DOC: HCPCS | Performed by: INTERNAL MEDICINE

## 2022-10-20 PROCEDURE — 1036F TOBACCO NON-USER: CPT | Performed by: INTERNAL MEDICINE

## 2022-10-20 PROCEDURE — 94618 PULMONARY STRESS TESTING: CPT | Performed by: INTERNAL MEDICINE

## 2022-10-20 PROCEDURE — G8427 DOCREV CUR MEDS BY ELIG CLIN: HCPCS | Performed by: INTERNAL MEDICINE

## 2022-10-20 PROCEDURE — 99205 OFFICE O/P NEW HI 60 MIN: CPT | Performed by: INTERNAL MEDICINE

## 2022-10-20 RX ORDER — TADALAFIL 20 MG/1
40 TABLET ORAL DAILY
Qty: 60 TABLET | Refills: 11 | Status: SHIPPED | OUTPATIENT
Start: 2022-10-20

## 2022-10-20 NOTE — PROGRESS NOTES
[address], [address] [facility]  [phone]    6 Minute Walk Test      Patient's Name Cindy Spaulding   Age 80 y.o. Gender female   Height    Weight  lbs   Ordering Provider       Medications taken before test are up to date:  Yes OR NO   Supplemental oxygen during the test:  Yes,2LPM    Base End    Time 838 851 Am    Blood Pressure 162/84 158/90 mmHg   Heart Rate  79 70 bpm   Dyspnea  2 5 Cristel Scale   Fatigue 2 4 Cristel Scale   O2 Saturation  100 RA 94 O2 RA / O2       Stopped or paused before 6 minutes? Yes  to start 2lpm of 02    Other symptoms at end of exercise:  None     Number of laps: ___3__ x 60 meters = ___180___ meters + final partial lap:______ meters =  _______ meters    Total distance walked in 6 minutes: 180  Meters                   Tech comments: pt tolerated the walk well.     Test Performed by: Kelly Martinez

## 2022-10-20 NOTE — PROGRESS NOTES
Patient Name:  Nadeen Stringer                               YOB: 1940  MRN: [id number]                                                Office Visit 10/20/2022    ASSESSMENT AND PLAN:  (Medical Decision Making)    {There are no diagnoses linked to this encounter. (Refresh or delete this SmartLink)}     Danni Marin MA        Total time for encounter on day of encounter was *** minutes. This time includes chart prep, review of tests/procedures, review of other provider's notes, documentation and counseling patient regarding disease process and medications. ___________________________________________________________________         ______      REASON FOR VISIT:   No chief complaint on file. HISTORY OF PRESENT ILLNESS:    Ms. Nadeen Stringer is a 80 y.o. female with a PMH of  *** who is seen at [facility] [place] [facility] today for  ***      Tobacco Use      Smoking status: Never      Smokeless tobacco: Never    Second Hand Smoke Exposure: {YES/NO:19726::\"No\"}  Birds: {YES/NO:19726::\"No\"}  Asbestos: {YES/NO:19726::\"No\"}  TB: {YES/NO:19726::\"No\"}  Hot Tubs/Humidifier: {YES/NO:19726::\"No\"}  Organic/Inorganic Dusts: {YES/NO:19726::\"No\"}  Molds: {YES/NO:19726::\"No\"}  Occupation/Hobbies: ***    REVIEW OF SYSTEMS:   10 point review of systems is negative except as reported in HPI. PHYSICAL EXAM:   There were no vitals filed for this visit. There is no height or weight on file to calculate BMI. General:   Alert, cooperative, no distress, appears stated age. Eyes/Ears/Nose:   Conjunctivae/corneas clear. PERRL. Nasal mucosa is normal.  Normal TMs and external auditory canals. Mouth/Throat:  Lips, mucosa, and tongue normal. Teeth and gums normal.        Lungs:     ***     Heart:   Regular rate and rhythm, S1, S2 normal, no murmur, click, rub or gallop. Abdomen:    Soft, non-tender. Extremities:  Extremities normal, atraumatic, no cyanosis or edema.      Skin:  Skin color normal. No rashes or lesions     Neurologic:  A&Ox3     DIAGNOSTIC TESTS:                                                                                                                        Pulmonary Function Testing:   Date    ***        FVC    ***        FEV1    ***        FEV1/FVC    ***        FEF 25-75%    ***        TLC    ***        FRC    ***        RV    ***        DLCO    ***        No flowsheet data found. AMBULATING OXIMETRY: ***  O2 sat HR   Room air at Rest ***% ***bpm   Room air ambulating ***% ***bpm   (recovery) Ambulating on ***Lpm ***% ***bpm   No results found for this or any previous visit. No results found for this or any previous visit. LABS:   Lab Results   Component Value Date/Time    WBC 7.9 05/04/2022 12:14 PM    HGB 14.1 05/04/2022 12:14 PM    HCT 43.4 05/04/2022 12:14 PM     05/04/2022 12:14 PM    TSH 0.426 05/04/2022 12:14 PM    CRUZ Positive 10/17/2022 12:59 PM     Imaging: I performed an independent interpretation of the patient's images. CXR:   XR CHEST PORTABLE 07/23/2018    Narrative  EXAM:  XR CHEST PORT    INDICATION:  sob    COMPARISON:  10/11/2017    FINDINGS: A portable AP radiograph of the chest was obtained at 0607 hours. The  patient is on a cardiac monitor. Left lower lobe airspace disease. .  The  cardiac and mediastinal contours and pulmonary vascularity are normal.  The  bones and soft tissues are grossly within normal limits. Impression  IMPRESSION: Small focus of left lower lobe atelectasis or pneumonia. CT Chest:   CT ABDOMEN PELVIS W IV CONTRAST 08/06/2021    Narrative  CT OF THE ABDOMEN AND PELVIS WITH CONTRAST. CLINICAL INDICATION: Painful urination, lower quadrant abdominal pain    PROCEDURE: Serial thin section axial images obtained from the lung bases through  the proximal femurs following the administration of 100 cc of Isovue 370  intravenous contrast.  Radiation dose reduction techniques were used for this  study.  Our CT scanners use one or all of the following: Automated exposure  control, adjusted of the mA and/or kV according to patient size, iterative  reconstruction    COMPARISON: CT abdomen and pelvis dated 7/3/2018    FINDINGS: Pulmonary fibrotic changes are noted the lung bases. CT ABDOMEN: The liver and spleen are normal. The pancreas is normal. The  gallbladder is contracted. The adrenal glands are normal. There is an atrophic  left kidney. The right kidney is normal in size. There is no hydronephrosis. No  adenopathy or ascites evident. The descending colon is entirely decompressed all  the way to the splenic flexure. No colonic pathology appreciated. The small  bowel is unremarkable. CT PELVIS: The bladder is well-distended with smooth thin walls. The sigmoid  colon is entirely decompressed. The rectum is unremarkable. No inguinal hernia  or adenopathy. No aggressive osseous is identified. Impression  1. No acute abdominal or pelvic abnormality evident. No findings noted to  explain acute pain. 2. Pulmonary fibrosis at the lung bases. 3. Atrophic left kidney. 05/04/22    TRANSTHORACIC ECHOCARDIOGRAM (TTE) COMPLETE (CONTRAST/BUBBLE/3D PRN) 05/05/2022 12:22 PM (Final)    Narrative  This is a summary report. The complete report is available in the patient's medical record. If you cannot access the medical record, please contact the sending organization for a detailed fax or copy. Left Ventricle: Low normal left ventricular systolic function with a visually estimated EF of 50 - 55%. Left ventricle size is normal. Normal wall thickness. Normal diastolic function. Base of the anteroseptum appears thinned and akinetic. Aortic Valve: Mild regurgitation. Mitral Valve: Mild regurgitation. Tricuspid Valve: Trace regurgitation. Unable to accurately assess RVSP due to inadequate TR.     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    Congestive heart failure (CHF) ([address]     diastolic HF    Dyspnea 8/61/4281    Dyspnea on exertion 4/9/2014    GERD (gastroesophageal reflux disease)     takes Tums    History of ITP     2012 in [facility] for treatments-last one Nov '12    Hyperlipidemia 4/9/2014    Hypertension 4/9/2014    medications    Hyponatremia 6/11/2015    Hypothyroid     medication    Hypothyroidism 6/11/2015    Hypoxemia 7/16/2014    Left atrial dilation     Liver disease     elevated enzymes- takes milk thistle    Lung nodule 4/9/2014    Initial CT scan in \[facility]\"" 11/15/2005--10 mm nodule in right mid lung; PET negative 6/13/2011 CT  10/16/2011 @ [place], [place]--Multiple right lung nodules without adenopathy     MI (myocardial infarction) ([address]     2009- no intervention- mild/ EF per echo 9/5/13= 40%    Nausea and vomiting 6/13/2016    denies    Other ill-defined conditions(799.89)     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    Pulmonary fibrosis ([address] 4/9/2014    Initial CT scan in \[facility]\""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     Pulmonary hypertension (HCC)     O2 2.5 L prn with activity     Pulmonary valve regurgitation     PVC (premature ventricular contraction) 4/25/2014    Raynaud's disease 4/9/2014    Right atrial dilation     Severe tricuspid regurgitation     Shingles 01/27/2019    Sicca syndrome, Sjogren's ([address] 4/9/2015    Thrombocytopenia ([address] 10/12/2015     Allergies   Allergen Reactions    Esomeprazole Magnesium Hives and Rash    Dextromethorphan Rash     Patient told me this information on a call for medication reconciliation. Amitriptyline Hives    Amlodipine Hives    Amoxicillin Hives and Other (See Comments)    Hydrochlorothiazide Hives    Ibuprofen Hives    Iodine Other (See Comments)    Levofloxacin Hives    Lisinopril Hives    Nebivolol Hives    Nitrofurantoin Hives and Other (See Comments)    Olmesartan Hives    Sulfa Antibiotics Hives    Cephalexin Hives and Rash    Flecainide Rash    Gabapentin Nausea And Vomiting    Lisinopril-Hydrochlorothiazide Rash    Nitrofurantoin Macrocrystal Rash    Tramadol Nausea And Vomiting     Current Outpatient Medications   Medication Instructions    acetaminophen (TYLENOL) 1,000 mg, Oral, EVERY 6 HOURS PRN    albuterol sulfate  (90 Base) MCG/ACT inhaler 1 puff, Inhalation    benzonatate (TESSALON) 200 mg, Oral, 3 TIMES DAILY PRN    Cholecalciferol 2,000 Units, Oral, DAILY    estradiol (ESTRACE) 0.5 mg, Oral, DAILY    furosemide (LASIX) 20 mg, Oral, DAILY    levothyroxine (SYNTHROID) 112 mcg, Oral, DAILY BEFORE BREAKFAST    LISINOPRIL PO Oral    loperamide (IMODIUM) 2 mg, Oral, PRN    magnesium oxide (MAG-OX) 400 mg, Oral, 2 TIMES DAILY    MILK THISTLE PO Oral    nystatin-triamcinolone (MYCOLOG II) 050750-4.1 UNIT/GM-% cream Topical, 3 TIMES DAILY    ondansetron (ZOFRAN-ODT) 4 mg, Oral, EVERY 6 HOURS PRN    predniSONE (DELTASONE) 50 MG tablet Take one tablet by mouth at 2:30 AM. Take one tablet by mouth at 8:30 AM. Take one tablet by mouth on direction of radiology technician when you arrive for the CT scan.     spironolactone (ALDACTONE) 25 mg, Oral    tadalafil (CIALIS) 40 mg, Oral, DAILY WITH BREAKFAST    Zinc Acetate, Oral, (ZINC ACETATE PO) Oral, DAILY

## 2022-10-20 NOTE — PROGRESS NOTES
Dr. Wilber Felty, MD  [address] [facility]. [address], [address] [facility]  [phone]    Patient Name:  Zack Yeung        YOB: 1940    Office Visit 10/20/2022    ASSESSMENT AND PLAN:  (Medical Decision Making)    1. Pulmonary hypertension ([address]  Extensive record review today. Patient seems to have primarily precapillary Group 1 PH though there is underlying ILD present on CT abd/pelvis and elevated CRUZ,  RNP Ab and SSA Ab. She has not tolerated ambrisentan due to liver enzyme elevation. Refer to pulmonary rehab. Check BNP next visit for formal risk assessment. Continue tadalafil and will coordinate rx and assistance with Nikolay Don.  -     CT CHEST HIGH RESOLUTION; Future  -     Ambulatory referral to Pulmonary Rehab    2. ILD (interstitial lung disease) ([facility]  Suspect CTD-ILD and will follow up after rheumatology evaluation and HRCT. Could consider OFEV or Tyvaso in future. -     CT CHEST HIGH RESOLUTION; Future    3. Positive CRUZ (antinuclear antibody)  Referral to rheum placed. 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 Romayne Gaines, MD  Electronically signed    Clinical time for encounter was 65 minutes. _________________________________________________________________________    HISTORY OF PRESENT ILLNESS:    Zack Yeung a 80 y.o. female with a PMH significant for Group 1 pulmonary hypertension, Raynaud's, positive CRUZ, ITP, PVCs, coronary artery disease, obesity who has been followed by Dr. Gaurav Madrigal cardiology and Dr. Amanda Keith for pulmonary hypertension. She reports that her pulmonary hypertension began in 2011. She currently wears 2 L/min of supplemental oxygen continuously and is on tadalafil 40 mg daily. She is also maintained on chronic diuretic therapy. At her last visit with Dr. Amanda Keith in February, she was doing well and today she reports she feels fantastic.   Her main item on her agenda today is making sure that she gets a new prescription for her tadalafil and that she is enrolled in the proper assistance program.    She also has concerns about chronic cough, for which she was seeing Tiffany Felton and was followed by GI for esophageal stricture. Tadalafil 40mg after breakfast  Furosemide 20mg daily  Spironolactone 25mg daily    Dry weight: 161 lbs  If she gains 2lbs in 2 days then she increases spironolactone to 25mg.     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, , accounting    Past Medical History:   Diagnosis Date    Acute on chronic combined systolic and diastolic heart failure ([facility] 6/11/2015    Acute renal failure (ARF) ([facility] 6/13/2015    Adiposity 11/10/2015    Arthritis 4/9/2014    Bilateral carotid artery stenosis     CAD (coronary artery disease) 4/9/2014    Moderate CAD 4/15     Chronic kidney disease     pt states she has one kidney    Coagulation disorder ([address]     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    Congestive heart failure (CHF) ([facility]     diastolic HF    Dyspnea 3/20/7807    Dyspnea on exertion 4/9/2014    GERD (gastroesophageal reflux disease)     takes Tums    History of ITP     2012 in [facility] for treatments-last one Nov '12    Hyperlipidemia 4/9/2014    Hypertension 4/9/2014    medications    Hyponatremia 6/11/2015    Hypothyroid     medication    Hypothyroidism 6/11/2015    Hypoxemia 7/16/2014    Left atrial dilation     Liver disease     elevated enzymes- takes milk thistle    Lung nodule 4/9/2014    Initial CT scan in [facility] [place] 11/15/2005--10 mm nodule in right mid lung; PET negative 6/13/2011 CT  10/16/2011 @ [facility] [place], [place]--Multiple right lung nodules without adenopathy     MI (myocardial infarction) ([facility]     2009- no intervention- mild/ EF per echo 9/5/13= 40%    Nausea and vomiting 6/13/2016    denies    Other ill-defined conditions(799.89)     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    Pulmonary fibrosis ([facility] 4/9/2014    Initial CT scan in [facility] [place]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     Pulmonary hypertension (HCC)     O2 2.5 L prn with activity     Pulmonary valve regurgitation     PVC (premature ventricular contraction) 4/25/2014    Raynaud's disease 4/9/2014    Right atrial dilation     Severe tricuspid regurgitation     Shingles 01/27/2019    Sicca syndrome, Sjogren's ([facility] 4/9/2015    Thrombocytopenia ([facility] 10/12/2015      Tobacco Use: Low Risk     Smoking Tobacco Use: Never    Smokeless Tobacco Use: Never       Allergies   Allergen Reactions    Esomeprazole Magnesium Hives and Rash    Dextromethorphan Rash     Patient told me this information on a call for medication reconciliation.     Amitriptyline Hives    Amlodipine Hives    Amoxicillin Hives and Other (See Comments)    Hydrochlorothiazide Hives    Ibuprofen Hives    Iodine Other (See Comments)    Levofloxacin Hives    Lisinopril Hives    Nebivolol Hives    Nitrofurantoin Hives and Other (See Comments)    Olmesartan Hives    Sulfa Antibiotics Hives    Cephalexin Hives and Rash    Flecainide Rash    Gabapentin Nausea And Vomiting    Lisinopril-Hydrochlorothiazide Rash    Nitrofurantoin Macrocrystal Rash    Tramadol Nausea And Vomiting     Current Outpatient Medications   Medication Sig    estradiol (ESTRACE) 0.5 MG tablet Take 1 tablet by mouth daily    LISINOPRIL PO Take by mouth    MILK THISTLE PO Take by mouth    Zinc Acetate, Oral, (ZINC ACETATE PO) Take by mouth daily    acetaminophen (TYLENOL) 500 MG tablet Take 1,000 mg by mouth every 6 hours as needed    albuterol sulfate  (90 Base) MCG/ACT inhaler Inhale 1 puff into the lungs    benzonatate (TESSALON) 200 MG capsule Take 200 mg by mouth 3 times daily as needed    Cholecalciferol 50 MCG (2000 UT) TABS Take 2,000 Units by mouth daily    furosemide (LASIX) 20 MG tablet Take 20 mg by mouth daily    levothyroxine (SYNTHROID) 112 MCG tablet Take 112 mcg by mouth every morning (before breakfast)    loperamide (IMODIUM) 2 MG capsule Take 2 mg by mouth as needed    magnesium oxide (MAG-OX) 400 MG tablet Take 400 mg by mouth 2 times daily    nystatin-triamcinolone (MYCOLOG II) 046287-2.1 UNIT/GM-% cream Apply topically 3 times daily    ondansetron (ZOFRAN-ODT) 4 MG disintegrating tablet Take 4 mg by mouth every 6 hours as needed    spironolactone (ALDACTONE) 25 MG tablet Take 25 mg by mouth    tadalafil (CIALIS) 20 MG tablet Take 40 mg by mouth daily (with breakfast)    predniSONE (DELTASONE) 50 MG tablet Take one tablet by mouth at 2:30 AM. Take one tablet by mouth at 8:30 AM. Take one tablet by mouth on direction of radiology technician when you arrive for the CT scan. No current facility-administered medications for this visit. PHYSICAL EXAM:  Vitals:    10/20/22 0858   BP: (!) 162/84   Pulse: 79   Temp: 97.1 °F (36.2 °C)   SpO2: 100%     Body mass index is 30.8 kg/m². General Appearance: The patient is pleasant and in no respiratory distress. HEENT: PERRLA. Conjunctivae unremarkable. Nasal mucosa is without epistaxis, exudate, or polyps. Gums and dentition are unremarkable. There is no oropharyngeal narrowing. TMs are clear. Neck/Lymphatic:  Symmetrical with no elevation of jugular venous pulsation. Trachea midline. No thyroid enlargement. No cervical adenopathy. Lungs:  Normal respiratory effort with symmetrical lung expansion. Breath sounds with basilar rales. Heart:  RRR No pronounced P2 or RV heave  Abdomen:  Soft and non-tender. No hepatosplenomegaly.   Bowel sounds are normal.    Extremity:  No edema, clubbing or cyanosis  Musculoskeletal:  No weakness, normal muscle tone and bulk. Neuro: The patient is alert and oriented to person, place, and time. Memory appears intact and mood is normal.  No gross sensorimotor deficits are present. DIAGNOSTIC TESTS:      6MWT 6/9/21 10/20/22   distance 182 meters (51% predicted) 180m   Start;end O2 sat 94%, 88% 100%; 87% 3l   Max VETO dyspnea 6 5   COMMENTS No O2 needed Required 3lpm        Right Heart Cath 6/15/15 4/16/15   RA   6   RV   50   PA 70/30 (43) 54/7 (<25)   PCWP 5-6mmHg 5   CO 3.2 3.6   PVR  11.5  5.5WU   comments          TTE 5/5/22 7/17/19 5/8/17 12/15/16 6/1/16 11/10/15 6/12/15 4/8/15   EF 50-55% 50-54% Low normal EF 50% 50-55% 45-50% 50-55% 50%   RV Normal size, thickness, and function RVE. Mpr,a; fimctopm   RV mildly dilated, RVH RV mildly dilated Normal RV size RV markedly dilated, function reduced Moderately RV enlargement   RVSP Unable to assess 51mmHg 45mmHg Unable to measure, TAPSE 22 40mmHg 13mmHg 110mmHg 42mmHg   COMMENTS Mild AR, mild MR, trace TR   Mild T Mild LVH, global hypokinesis  JASMIN enlargement, mild to moderate TR, mild PI LA/RA normal size, trace TR, trace PI Diastolic flattening, LAE, RA massively dilated, IVC dilated, trace MR, severe TR, moderate PI.   Moderate global hypokinesis, frequent PVCs      Chest CT:  6/13/15:   Chronic inflammatory changes with some reactive lymph nodes, non-calcified granulomas      3/30/17: there is minimal scarring in both lung apices, peripheral subpleural linear opacities in the right upper lobe which is stable when compared with the prior exam. There are additional peripheral nodular and linear parenchymal densities in the right lung which are stable when compared prior exam. I believe there is some increased linear parenchymal opacity in the medial segment right middle lobe which is more prominent than seen on the prior exam. There are findings which could be secondary to peripheral pulmonary fibrosis in the left lung base. V/Q Scan:6/14/15  There are 2 matched perfusion ventilation defects in the left lung, one at the   posterior apex and the other at the base laterally. These findings are not   unexpected given the presence of the patient's enlarged heart plus chronic   postinflammatory scarring. This yields a low probability for pulmonary embolism       7/12/16 10/17/2022   FVC 2.14L (57%) 2.0 (89%)   FEV1 1.71L (60%) 1.45 (89%)   FEV1/FVC 80% 0.72   TLC   3.51 (76%)   FRC      RV      DLCO 9.3mL(41%) 9.3 (48%)      CT Result (most recent):  CT ABDOMEN PELVIS W IV CONTRAST 08/06/2021    Narrative  CT OF THE ABDOMEN AND PELVIS WITH CONTRAST. CLINICAL INDICATION: Painful urination, lower quadrant abdominal pain    PROCEDURE: Serial thin section axial images obtained from the lung bases through  the proximal femurs following the administration of 100 cc of Isovue 370  intravenous contrast.  Radiation dose reduction techniques were used for this  study. Our CT scanners use one or all of the following: Automated exposure  control, adjusted of the mA and/or kV according to patient size, iterative  reconstruction    COMPARISON: CT abdomen and pelvis dated 7/3/2018    FINDINGS: Pulmonary fibrotic changes are noted the lung bases. CT ABDOMEN: The liver and spleen are normal. The pancreas is normal. The  gallbladder is contracted. The adrenal glands are normal. There is an atrophic  left kidney. The right kidney is normal in size. There is no hydronephrosis. No  adenopathy or ascites evident. The descending colon is entirely decompressed all  the way to the splenic flexure. No colonic pathology appreciated. The small  bowel is unremarkable. CT PELVIS: The bladder is well-distended with smooth thin walls. The sigmoid  colon is entirely decompressed. The rectum is unremarkable. No inguinal hernia  or adenopathy. No aggressive osseous is identified. Impression  1.  No acute abdominal or pelvic abnormality evident. No findings noted to  explain acute pain. 2. Pulmonary fibrosis at the lung bases. 3. Atrophic left kidney. Lab Diagnostics:  HIV: No results found for: [id number]   HCV:   Lab Results   Component Value Date    HEPCAB NONREACTIVE 10/17/2022      CRUZ:    Lab Results   Component Value Date    CRUZ Positive (A) 10/17/2022      dsDNA Ab 0 - 9 IU/mL 1    Comment: (NOTE)                                     Negative      <5                                     Equivocal  5 - 9                                     Positive      >9    Anti-RNP 0.0 - 0.9 AI 2.6 High     IRENE Mckeon (SM) Ab 0.0 - 0.9 AI <0.2    IRENE SCL-70 Ab 0.0 - 0.9 AI <0.2    IRENE SSA (RO) Ab 0.0 - 0.9 AI 7.5 High     IRENE SSB (LA) Ab 0.0 - 0.9 AI <0.2    Chromatin Antibody 0.0 - 0.9 AI <0.2    IRENE MINH-1 Ab 0.0 - 0.9 AI <0.2    CENTROMERE PROTEIN B ANTIBODY 0.0 - 0.9 AI <0.2      NT-pro BNP : No results found for: BNPNT            LFTs:  Lab Results   Component Value Date    ALT 21 05/04/2022    AST 23 05/04/2022    ALKPHOS 64 05/04/2022    BILITOT 0.7 05/04/2022          ------------------------------------------------------------------------------------------------------  REFERENCE TABLES        PH GROUP: 1.4.1     CURRENT WHO FUNCTIONAL CLASS:       I without resulting limitations of physical activity. Ordinary physical activity does not cause undue fatigue or dyspnea, chest pain, or heart syncope. Generally no therapy   II slight limitation of physical activity. They are comfortable at rest. Ordinary physical activity results in undue fatigue or dyspnea, chest pain, or heart syncope. initially administer oral agents that target the endothelin and nitric oxide-cyclic guanosine monophosphate (cGMP) pathways in combination   III They are comfortable at rest. Less than ordinary physical activity causes undue fatigue or dyspnea, chest pain, or heart syncope. same   IV resulting in inability to carry on any physical activity without symptoms.  These patients manifest signs of right heart failure. Dyspnea and/or fatigue may be present even at rest. Discomfort is increased by physical activity.  Referral to tertiary:  Nilam Reddy or [place] preferred  parenteral prostanoid: epo, trepostinil> iloprost

## 2022-10-21 ENCOUNTER — HOSPITAL ENCOUNTER (OUTPATIENT)
Dept: MAMMOGRAPHY | Age: 82
Discharge: HOME | End: 2022-10-24
Payer: MEDICARE

## 2022-10-21 DIAGNOSIS — Z12.31 ENCOUNTER FOR SCREENING MAMMOGRAM FOR MALIGNANT NEOPLASM OF BREAST: ICD-10-CM

## 2022-10-21 PROCEDURE — 77067 SCR MAMMO BI INCL CAD: CPT

## 2022-11-03 ENCOUNTER — OFFICE VISIT (OUTPATIENT)
Dept: UROLOGY | Age: 82
End: 2022-11-03
Payer: MEDICARE

## 2022-11-03 DIAGNOSIS — R30.0 DYSURIA: ICD-10-CM

## 2022-11-03 DIAGNOSIS — R35.0 URINARY FREQUENCY: Primary | ICD-10-CM

## 2022-11-03 LAB
BILIRUBIN, URINE, POC: NEGATIVE
BLOOD URINE, POC: ABNORMAL
GLUCOSE URINE, POC: 100
KETONES, URINE, POC: NEGATIVE
LEUKOCYTE ESTERASE, URINE, POC: ABNORMAL
NITRITE, URINE, POC: ABNORMAL
PH, URINE, POC: 6.5 (ref 4.6–8)
PROTEIN,URINE, POC: ABNORMAL
SPECIFIC GRAVITY, URINE, POC: 1.01 (ref 1–1.03)
URINALYSIS CLARITY, POC: CLEAR
URINALYSIS COLOR, POC: YELLOW
UROBILINOGEN, POC: 0.2

## 2022-11-03 PROCEDURE — 81003 URINALYSIS AUTO W/O SCOPE: CPT | Performed by: NURSE PRACTITIONER

## 2022-11-03 RX ORDER — TRIMETHOPRIM 100 MG/1
100 TABLET ORAL 2 TIMES DAILY
Qty: 14 TABLET | Refills: 0 | Status: SHIPPED | OUTPATIENT
Start: 2022-11-03 | End: 2022-11-09 | Stop reason: SDUPTHER

## 2022-11-03 NOTE — PROGRESS NOTES
Patient drop off urine sample at 9am with complaints of Burning, constant urination every 2 hours waking up started yesterday, was taking AZO stopped taking it. Pharmacy: [facility]. UA - Dipstick  Results for orders placed or performed in visit on 08/24/22   AMB POC URINALYSIS DIP STICK AUTO W/O MICRO   Result Value Ref Range    Color (UA POC)      Clarity (UA POC)      Glucose, Urine, POC Negative Negative    Bilirubin, Urine, POC Negative Negative    KETONES, Urine, POC Negative Negative    Specific Gravity, Urine, POC 1.020 1.001 - 1.035    Blood (UA POC) Small Negative    pH, Urine, POC 7.0 4.6 - 8.0    Protein, Urine, POC 30 Negative    Urobilinogen, POC 0.2 mg/dL     Nitrite, Urine, POC Negative Negative    Leukocyte Esterase, Urine, POC Large Negative       Requested Prescriptions      No prescriptions requested or ordered in this encounter     Plan    Diagnosis Orders   1. Urinary frequency  AMB POC URINALYSIS DIP STICK AUTO W/O MICRO      2. Dysuria  AMB POC URINALYSIS DIP STICK AUTO W/O MICRO           Urine infected. Culture pending. Trimethoprim sent to pharmacy. Need apt w me next week for follow up.     Janie Aguilar, YOLYP, APRN - CNP

## 2022-11-06 LAB
BACTERIA SPEC CULT: ABNORMAL
SERVICE CMNT-IMP: ABNORMAL

## 2022-11-07 DIAGNOSIS — I27.0 PRIMARY PULMONARY HYPERTENSION (HCC): Primary | ICD-10-CM

## 2022-11-09 ENCOUNTER — OFFICE VISIT (OUTPATIENT)
Dept: UROLOGY | Age: 82
End: 2022-11-09
Payer: MEDICARE

## 2022-11-09 DIAGNOSIS — N39.0 RECURRENT UTI: Primary | ICD-10-CM

## 2022-11-09 DIAGNOSIS — N30.20 CHRONIC CYSTITIS: ICD-10-CM

## 2022-11-09 LAB
BILIRUBIN, URINE, POC: NEGATIVE
BLOOD URINE, POC: NEGATIVE
GLUCOSE URINE, POC: NEGATIVE
KETONES, URINE, POC: NEGATIVE
LEUKOCYTE ESTERASE, URINE, POC: NEGATIVE
NITRITE, URINE, POC: NEGATIVE
PH, URINE, POC: 6.5 (ref 4.6–8)
PROTEIN,URINE, POC: NEGATIVE
SPECIFIC GRAVITY, URINE, POC: 1.01 (ref 1–1.03)
URINALYSIS CLARITY, POC: NORMAL
URINALYSIS COLOR, POC: NORMAL
UROBILINOGEN, POC: NORMAL

## 2022-11-09 PROCEDURE — G8484 FLU IMMUNIZE NO ADMIN: HCPCS | Performed by: NURSE PRACTITIONER

## 2022-11-09 PROCEDURE — G8400 PT W/DXA NO RESULTS DOC: HCPCS | Performed by: NURSE PRACTITIONER

## 2022-11-09 PROCEDURE — 1123F ACP DISCUSS/DSCN MKR DOCD: CPT | Performed by: NURSE PRACTITIONER

## 2022-11-09 PROCEDURE — 99214 OFFICE O/P EST MOD 30 MIN: CPT | Performed by: NURSE PRACTITIONER

## 2022-11-09 PROCEDURE — G8417 CALC BMI ABV UP PARAM F/U: HCPCS | Performed by: NURSE PRACTITIONER

## 2022-11-09 PROCEDURE — 1036F TOBACCO NON-USER: CPT | Performed by: NURSE PRACTITIONER

## 2022-11-09 PROCEDURE — 1090F PRES/ABSN URINE INCON ASSESS: CPT | Performed by: NURSE PRACTITIONER

## 2022-11-09 PROCEDURE — 81003 URINALYSIS AUTO W/O SCOPE: CPT | Performed by: NURSE PRACTITIONER

## 2022-11-09 PROCEDURE — G8427 DOCREV CUR MEDS BY ELIG CLIN: HCPCS | Performed by: NURSE PRACTITIONER

## 2022-11-09 RX ORDER — TRIMETHOPRIM 100 MG/1
100 TABLET ORAL 2 TIMES DAILY
Qty: 14 TABLET | Refills: 0 | Status: SHIPPED | OUTPATIENT
Start: 2022-11-09 | End: 2022-11-16

## 2022-11-09 ASSESSMENT — ENCOUNTER SYMPTOMS: BACK PAIN: 0

## 2022-11-09 NOTE — PROGRESS NOTES
[facility]  [address], [address]  [phone]          Jarret Liu  : 1940    Chief Complaint   Patient presents with    Other     Recurrent UTI          HPI     Jarret Liu is a 80 y.o. female followed for recurrent UTI's and most recently, bladder lesion. Had gross hematuria in 2018. Had neg work up on 2018 other than L renal atrophy and atrophic vaginitis. Started on Estrace cream twice wkly. she is now using this PRN. Stable mild cystocele by report. Recently had recurrence of pelvic pressure and dysuria. Ucx on 20 grew Klebsiella but no recent positive culture results. Reported improvement with abx and urogesic blue. Previous bladder biopsies in  showed cystitis. CT on 21 shows L renal atrophy and pulm fibrosis with normal upper tracts. [address]. Cyst in office 21 showed PVR small. Pelvic exam was unremarkable. Scattered areas of cystitis noted. Bladder lesion noted. She went to the OR 21 for cystoscopy, bladder biopsies, fulguration of abnormal bladder urothelium, bilateral retrograde pyelograms. RGP WNL. Bladder bx benign. She has had recent recurrence of UTI. Positive urine culture 22 and 11/3/22. She is back today for follow up. Doing much better.       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    Congestive heart failure (CHF) ([address]     diastolic HF    Dyspnea     Dyspnea on exertion 2014    GERD (gastroesophageal reflux disease)     takes Tums    History of ITP      in [place] for treatments-last one Nov '12    Hyperlipidemia 4/9/2014    Hypertension 4/9/2014    medications    Hyponatremia 6/11/2015    Hypothyroid     medication    Hypothyroidism 6/11/2015    Hypoxemia 7/16/2014    Left atrial dilation     Liver disease     elevated enzymes- takes milk thistle    Lung nodule 4/9/2014    Initial CT scan in [facility] 11/15/2005--10 mm nodule in right mid lung; PET negative 6/13/2011 CT  10/16/2011 @ [facility], [place], [place]--Multiple right lung nodules without adenopathy     MI (myocardial infarction) ([address]     2009- no intervention- mild/ EF per echo 9/5/13= 40%    Nausea and vomiting 6/13/2016    denies    Other ill-defined conditions(799.89)     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    Pulmonary fibrosis ([address] 4/9/2014    Initial CT scan in [facility]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     Pulmonary hypertension (HCC)     O2 2.5 L prn with activity     Pulmonary valve regurgitation     PVC (premature ventricular contraction) 4/25/2014    Raynaud's disease 4/9/2014    Right atrial dilation     Severe tricuspid regurgitation     Shingles 01/27/2019    Sicca syndrome, Sjogren's ([address] 4/9/2015    Thrombocytopenia ([address] 10/12/2015     Past Surgical History:   Procedure Laterality Date    APPENDECTOMY      COLONOSCOPY      2002 or 2004 polyps [place]    COLONOSCOPY W/BIOPSY SINGLE/MULTIPLE  12/13/2013         HERNIA REPAIR      bilat    HYSTERECTOMY (CERVIX STATUS UNKNOWN)      ORTHOPEDIC SURGERY      spinal/transplant 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    TONSILLECTOMY AND ADENOIDECTOMY       Current Outpatient Medications   Medication Sig Dispense Refill    trimethoprim (TRIMPEX) 100 MG tablet Take 1 tablet by mouth 2 times daily for 7 days 14 tablet 0    Tadalafil, PAH, (ADCIRCA) 20 MG tablet Take 2 tablets by mouth daily 60 tablet 11    estradiol (ESTRACE) 0.5 MG tablet Take 1 tablet by mouth daily 90 tablet 0    LISINOPRIL PO Take by mouth      MILK THISTLE PO Take by mouth      Zinc Acetate, Oral, (ZINC ACETATE PO) Take by mouth daily      acetaminophen (TYLENOL) 500 MG tablet Take 1,000 mg by mouth every 6 hours as needed      albuterol sulfate  (90 Base) MCG/ACT inhaler Inhale 1 puff into the lungs      benzonatate (TESSALON) 200 MG capsule Take 200 mg by mouth 3 times daily as needed      Cholecalciferol 50 MCG (2000 UT) TABS Take 2,000 Units by mouth daily      furosemide (LASIX) 20 MG tablet Take 20 mg by mouth daily      levothyroxine (SYNTHROID) 112 MCG tablet Take 112 mcg by mouth every morning (before breakfast)      loperamide (IMODIUM) 2 MG capsule Take 2 mg by mouth as needed      magnesium oxide (MAG-OX) 400 MG tablet Take 400 mg by mouth 2 times daily      nystatin-triamcinolone (MYCOLOG II) 078909-4.1 UNIT/GM-% cream Apply topically 3 times daily      ondansetron (ZOFRAN-ODT) 4 MG disintegrating tablet Take 4 mg by mouth every 6 hours as needed      spironolactone (ALDACTONE) 25 MG tablet Take 25 mg by mouth      predniSONE (DELTASONE) 50 MG tablet Take one tablet by mouth at 2:30 AM. Take one tablet by mouth at 8:30 AM. Take one tablet by mouth on direction of radiology technician when you arrive for the CT scan. No current facility-administered medications for this visit. Allergies   Allergen Reactions    Esomeprazole Magnesium Hives and Rash    Dextromethorphan Rash     Patient told me this information on a call for medication reconciliation.     Amitriptyline Hives    Amlodipine Hives    Amoxicillin Hives and Other (See Comments)    Hydrochlorothiazide Hives    Ibuprofen Hives    Iodine Other (See Comments)    Levofloxacin Hives    Lisinopril Hives    Nebivolol Hives    Nitrofurantoin Hives and Other (See Comments)    Olmesartan Hives    Sulfa Antibiotics Hives    Cephalexin Hives and Rash    Flecainide Rash    Gabapentin Nausea And Vomiting    Lisinopril-Hydrochlorothiazide Rash    Nitrofurantoin Macrocrystal Rash    Tramadol Nausea And Vomiting     Social History     Socioeconomic History    Marital status:      Spouse name: Not on file    Number of children: Not on file    Years of education: Not on file    Highest education level: Not on file   Occupational History    Not on file   Tobacco Use    Smoking status: Never    Smokeless tobacco: Never   Substance and Sexual Activity    Alcohol use: Not Currently    Drug use: No    Sexual activity: Not on file   Other Topics Concern    Not on file   Social History Narrative     and lives alone. Daughter lives in [place]. No pets. Retired, previously worked as an . Social Determinants of Health     Financial Resource Strain: Not on file   Food Insecurity: Not on file   Transportation Needs: Not on file   Physical Activity: Not on file   Stress: Not on file   Social Connections: Not on file   Intimate Partner Violence: Not on file   Housing Stability: Not on file     Family History   Problem Relation Age of Onset    Breast Cancer Mother 47    Cancer Mother         ovarian    Heart Disease Father         MI age 73/ valve replaced    Hypertension Father     Colon Cancer Neg Hx        Review of Systems  Constitutional:   Negative for fever. Genitourinary: Positive for recurrent UTIs and frequent urination. Musculoskeletal:  Negative for back pain.     Urinalysis  UA - Dipstick  Results for orders placed or performed in visit on 11/09/22   AMB POC URINALYSIS DIP STICK AUTO W/O MICRO   Result Value Ref Range    Color (UA POC)      Clarity (UA POC)      Glucose, Urine, POC Negative Negative    Bilirubin, Urine, POC Negative Negative    KETONES, Urine, POC Negative Negative    Specific Gravity, Urine, POC 1.015 1.001 - 1.035    Blood (UA POC) Negative Negative    pH, Urine, POC 6.5 4.6 - 8.0    Protein, Urine, POC Negative Negative    Urobilinogen, POC 0.2 mg/dL     Nitrite, Urine, POC Negative Negative    Leukocyte Esterase, Urine, POC Negative Negative   Results for orders placed or performed in visit on 11/03/22   AMB POC URINALYSIS DIP STICK AUTO W/O MICRO   Result Value Ref Range    Color, Urine, POC Yellow     Clarity, Urine, POC clear     Glucose, Urine,  Negative    Bilirubin, Urine, POC Negative Negative    Ketones, Urine, POC Negative Negative    Specific Gravity, Urine, POC 1.015 1.001 - 1.035    Blood, Urine, POC small Negative    pH, Urine, POC 6.5 4.6 - 8.0    Protein, Urine, POC Trace Negative    Urobilinogen, POC 0.2     Nitrate, Urine, POC pos Negative    Leukocyte Esterase, Urine, POC Large Negative       UA - Micro  WBC - 0  RBC - 0  Bacteria - 0  Epith - 0    PHYSICAL EXAM    General appearance - well appearing and in no distress  Mental status - alert, oriented to person, place, and time  Neck - supple, no significant adenopathy  Chest/Lung-  Quiet, even and easy respiratory effort without use of accessory muscles  Skin - normal coloration and turgor, no rashes      Assessment and Plan    ICD-10-CM    1. Recurrent UTI  N39.0 AMB POC URINALYSIS DIP STICK AUTO W/O MICRO      2. Chronic cystitis  N30.20 AMB POC URINALYSIS DIP STICK AUTO W/O MICRO          Recurrent UTI/Chronic cystitis- prev work up negative except cystitis. Recent recurrent UTI. Doing well today. Discussed antibiotic suppression. Risks, benefits, and alternatives reviewed. She would like an additional week of current antibiotic and then monitor sx. Risks, benefits, and alternatives reviewed. We agree to this plan. Take and add 7 days of trimethoprim 100 mg PO BID. Advised to call this office if develops sx of UTI for specimen check. If unable to come to this office, encouraged to have UC performed and records sent to this office. Patricia Chavez, FNP, APRN - CNP  Dr. Elan Crump is supervising physician today and he approves plan of care.

## 2022-11-14 NOTE — PROGRESS NOTES
REVA Parker is a 80 y.o. female seen for follow up menopause. The hormones are working well for her. Past Medical History, Past Surgical History, Family history, Social History, and Medications were all reviewed with the patient today and updated as necessary. Current Outpatient Medications   Medication Sig    estradiol (ESTRACE) 0.5 MG tablet Take 1 tablet by mouth daily    trimethoprim (TRIMPEX) 100 MG tablet Take 1 tablet by mouth 2 times daily for 7 days    LISINOPRIL PO Take by mouth    MILK THISTLE PO Take by mouth    Zinc Acetate, Oral, (ZINC ACETATE PO) Take by mouth daily    acetaminophen (TYLENOL) 500 MG tablet Take 1,000 mg by mouth every 6 hours as needed    albuterol sulfate  (90 Base) MCG/ACT inhaler Inhale 1 puff into the lungs    Cholecalciferol 50 MCG (2000 UT) TABS Take 2,000 Units by mouth daily    furosemide (LASIX) 20 MG tablet Take 20 mg by mouth daily    levothyroxine (SYNTHROID) 112 MCG tablet Take 112 mcg by mouth every morning (before breakfast)    loperamide (IMODIUM) 2 MG capsule Take 2 mg by mouth as needed    magnesium oxide (MAG-OX) 400 MG tablet Take 400 mg by mouth 2 times daily    ondansetron (ZOFRAN-ODT) 4 MG disintegrating tablet Take 4 mg by mouth every 6 hours as needed    spironolactone (ALDACTONE) 25 MG tablet Take 25 mg by mouth     No current facility-administered medications for this visit. Allergies   Allergen Reactions    Esomeprazole Magnesium Hives and Rash    Dextromethorphan Rash     Patient told me this information on a call for medication reconciliation.     Amitriptyline Hives    Amlodipine Hives    Amoxicillin Hives and Other (See Comments)    Hydrochlorothiazide Hives    Ibuprofen Hives    Iodine Other (See Comments)    Levofloxacin Hives    Lisinopril Hives    Nebivolol Hives    Nitrofurantoin Hives and Other (See Comments)    Olmesartan Hives    Sulfa Antibiotics Hives    Cephalexin Hives and Rash    Flecainide Rash    Gabapentin Nausea And Vomiting    Lisinopril-Hydrochlorothiazide Rash    Nitrofurantoin Macrocrystal Rash    Tramadol Nausea And Vomiting     Past Medical History:   Diagnosis Date    Acute on chronic combined systolic and diastolic heart failure ([address] 6/11/2015    Acute renal failure (ARF) ([address] 6/13/2015    Adiposity 11/10/2015    Arthritis 4/9/2014    Bilateral carotid artery stenosis     CAD (coronary artery disease) 4/9/2014    Moderate CAD 4/15     Chronic kidney disease     pt states she has one kidney    Coagulation disorder ([address]     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    Congestive heart failure (CHF) (HCC)     diastolic HF    Dyspnea 9/79/4152    Dyspnea on exertion 4/9/2014    GERD (gastroesophageal reflux disease)     takes Tums    History of ITP     2012 in [place] for treatments-last one Nov '12    Hyperlipidemia 4/9/2014    Hypertension 4/9/2014    medications    Hyponatremia 6/11/2015    Hypothyroid     medication    Hypothyroidism 6/11/2015    Hypoxemia 7/16/2014    Left atrial dilation     Liver disease     elevated enzymes- takes milk thistle    Lung nodule 4/9/2014    Initial CT scan in [facility] 11/15/2005--10 mm nodule in right mid lung; PET negative 6/13/2011 CT  10/16/2011 @ [facility], [place], [place]--Multiple right lung nodules without adenopathy     MI (myocardial infarction) ([address]     2009- no intervention- mild/ EF per echo 9/5/13= 40%    Nausea and vomiting 6/13/2016    denies    Other ill-defined conditions(799.89)     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    Pulmonary fibrosis ([address] 4/9/2014    Initial CT scan in [facility]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     Pulmonary hypertension (HCC)     O2 2.5 L prn with activity     Pulmonary valve regurgitation     PVC (premature ventricular contraction) 4/25/2014    Raynaud's disease 4/9/2014    Right atrial dilation     Severe tricuspid regurgitation     Shingles 01/27/2019    Sicca syndrome, Sjogren's ([address] 4/9/2015 Thrombocytopenia [facility] 10/12/2015     Past Surgical History:   Procedure Laterality Date    APPENDECTOMY      COLONOSCOPY W/BIOPSY SINGLE/MULTIPLE  12/13/2013         HERNIA REPAIR      bilat    LAPAROSCOPIC HYSTERECTOMY      ORTHOPEDIC SURGERY      spinal/transplant 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    TONSILLECTOMY AND ADENOIDECTOMY       Family History   Problem Relation Age of Onset    Breast Cancer Mother 47    Cancer Mother         ovarian    Heart Disease Father         MI age 73/ valve replaced    Hypertension Father     Colon Cancer Neg Hx       Social History     Tobacco Use    Smoking status: Never    Smokeless tobacco: Never   Substance Use Topics    Alcohol use: Not Currently       Social History     Substance and Sexual Activity   Sexual Activity Not on file     OB History   No obstetric history on file. Health Maintenance  Mammogram: 10/21/22  Colonoscopy: 2013   Bone Density:    ROS:    Review of Systems  General: Not Present- Chills, Fever, Fatigue, Insomnia, Hot flashes/Night sweats, Weight gain  Skin: Not Present- Bruising, Change in Wart/Mole, Excessive Sweating, Itching, Nail Changes, New Lesions, Rash, Skin Color Changes and Ulcer. HEENT: Not Present- Headache, Blurred Vision, Double Vision, Glaucoma, Visual Disturbances, Hearing Loss, Ringing in the Ears, Vertigo, Nose Bleed, Bleeding Gums, Hoarseness and Sore Throat. Neck: Not Present- Neck Pain and Neck Swelling. Respiratory: Not Present- Cough, Difficulty Breathing and Difficulty Breathing on Exertion. Breast: Not Present- Breast Mass, Breast Pain, Breast Swelling, Nipple Discharge, Nipple Pain, Recent Breast Size Changes and Skin Changes. Cardiovascular: Not Present- Abnormal Blood Pressure, Chest Pain, Edema, Fainting / Blacking Out, Palpitations, Shortness of Breath and Swelling of Extremities.   Gastrointestinal: Not Present- Abdominal Pain, Abdominal Swelling, Bloating, Change in Bowel Habits, Constipation, Diarrhea, Difficulty Swallowing, Gets full quickly at meals, Nausea, Rectal Bleeding and Vomiting. Female Genitourinary: Not Present- Dysmenorrhea, Dyspareunia, Decreased libido, Excessive Menstrual Bleeding, Menstrual Irregularities, Pelvic Pain, Urinary Complaints, Vaginal Discharge, Vaginal itching/burning, Vaginal odor  Musculoskeletal: Not Present- Joint Pain and Muscle Pain. Neurological: Not Present- Dizziness, Fainting, Headaches and Seizures. Psychiatric: Not Present- Anxiety, Depression, Mood changes and Panic Attacks. Endocrine: Not Present- Appetite Changes, Cold Intolerance, Excessive Thirst, Excessive Urination and Heat Intolerance. Hematology: Not Present- Abnormal Bleeding, Easy Bruising and Enlarged Lymph Nodes. PHYSICAL EXAM:    /80 (Position: Sitting)   Ht 5' 1\" (1.549 m)   Wt 161 lb (73 kg)   BMI 30.42 kg/m²     Constitutional: Vital signs are normal. She appears well-developed and well-nourished. She is active and cooperative. Neurological: She is alert. Nursing note and vitals reviewed. Medical problems and test results were reviewed with the patient today. ASSESSMENT and PLAN    1. Menopause syndrome  -     estradiol (ESTRACE) 0.5 MG tablet; Take 1 tablet by mouth daily, Disp-90 tablet, R-4Normal           Time:  I spent  30 minutes in preparing to see patient (including chart review and preparation), obtaining and/or reviewing additional medical history, performing a physical exam and evaluation, documenting clinical information in the electronic health record, independently interpreting results, communicating results to patient, family or caregiver, and/or coordinating care. No follow-ups on file.        Billye Cabot, MD

## 2022-11-15 ENCOUNTER — OFFICE VISIT (OUTPATIENT)
Dept: GYNECOLOGY | Age: 82
End: 2022-11-15
Payer: MEDICARE

## 2022-11-15 VITALS
SYSTOLIC BLOOD PRESSURE: 132 MMHG | WEIGHT: 161 LBS | BODY MASS INDEX: 30.4 KG/M2 | HEIGHT: 61 IN | DIASTOLIC BLOOD PRESSURE: 80 MMHG

## 2022-11-15 DIAGNOSIS — N95.1 MENOPAUSE SYNDROME: ICD-10-CM

## 2022-11-15 PROCEDURE — 99214 OFFICE O/P EST MOD 30 MIN: CPT | Performed by: OBSTETRICS & GYNECOLOGY

## 2022-11-15 PROCEDURE — G8427 DOCREV CUR MEDS BY ELIG CLIN: HCPCS | Performed by: OBSTETRICS & GYNECOLOGY

## 2022-11-15 PROCEDURE — 1123F ACP DISCUSS/DSCN MKR DOCD: CPT | Performed by: OBSTETRICS & GYNECOLOGY

## 2022-11-15 PROCEDURE — G8400 PT W/DXA NO RESULTS DOC: HCPCS | Performed by: OBSTETRICS & GYNECOLOGY

## 2022-11-15 PROCEDURE — 3078F DIAST BP <80 MM HG: CPT | Performed by: OBSTETRICS & GYNECOLOGY

## 2022-11-15 PROCEDURE — 1090F PRES/ABSN URINE INCON ASSESS: CPT | Performed by: OBSTETRICS & GYNECOLOGY

## 2022-11-15 PROCEDURE — G8484 FLU IMMUNIZE NO ADMIN: HCPCS | Performed by: OBSTETRICS & GYNECOLOGY

## 2022-11-15 PROCEDURE — 1036F TOBACCO NON-USER: CPT | Performed by: OBSTETRICS & GYNECOLOGY

## 2022-11-15 PROCEDURE — 3075F SYST BP GE 130 - 139MM HG: CPT | Performed by: OBSTETRICS & GYNECOLOGY

## 2022-11-15 PROCEDURE — G8417 CALC BMI ABV UP PARAM F/U: HCPCS | Performed by: OBSTETRICS & GYNECOLOGY

## 2022-11-15 RX ORDER — TADALAFIL 20 MG/1
40 TABLET ORAL DAILY
Qty: 60 TABLET | Refills: 11
Start: 2022-11-15

## 2022-11-15 RX ORDER — ESTRADIOL 0.5 MG/1
0.5 TABLET ORAL DAILY
Qty: 90 TABLET | Refills: 4 | Status: SHIPPED | OUTPATIENT
Start: 2022-11-15

## 2022-11-15 NOTE — TELEPHONE ENCOUNTER
Called and spoke with the patient, she was needing to know about reapplying for the [employer] Patient Assistance for her Tadalafil. I have called [address] [address] and spoke with Gita Marley, she stated that the patient just received her shipment on 11/3/22 @ a $0 copay under the ASSIST PAP. She will need to contact [facility] around 12/1/22 in order to re-apply for the program as hers expires on 12/26/22. I have contacted the patient and informed her of the above information, she states understanding and will discuss it with [facility] when they call her for her refill in Dec. She will call us if she need further assistance with re-applying.

## 2022-11-16 ENCOUNTER — OFFICE VISIT (OUTPATIENT)
Dept: CARDIOLOGY CLINIC | Age: 82
End: 2022-11-16
Payer: MEDICARE

## 2022-11-16 VITALS
DIASTOLIC BLOOD PRESSURE: 82 MMHG | HEIGHT: 61 IN | HEART RATE: 68 BPM | SYSTOLIC BLOOD PRESSURE: 138 MMHG | WEIGHT: 161.4 LBS | BODY MASS INDEX: 30.47 KG/M2

## 2022-11-16 DIAGNOSIS — I50.32 DIASTOLIC CHF, CHRONIC (HCC): ICD-10-CM

## 2022-11-16 DIAGNOSIS — J96.11 CHRONIC RESPIRATORY FAILURE WITH HYPOXIA (HCC): ICD-10-CM

## 2022-11-16 DIAGNOSIS — R55 POSTURAL DIZZINESS WITH PRESYNCOPE: ICD-10-CM

## 2022-11-16 DIAGNOSIS — I49.3 PVC (PREMATURE VENTRICULAR CONTRACTION): ICD-10-CM

## 2022-11-16 DIAGNOSIS — R42 POSTURAL DIZZINESS WITH PRESYNCOPE: ICD-10-CM

## 2022-11-16 DIAGNOSIS — I50.32 CHRONIC DIASTOLIC (CONGESTIVE) HEART FAILURE (HCC): ICD-10-CM

## 2022-11-16 DIAGNOSIS — I27.20 PULMONARY HYPERTENSION (HCC): ICD-10-CM

## 2022-11-16 DIAGNOSIS — I42.0 DILATED CARDIOMYOPATHY (HCC): Primary | ICD-10-CM

## 2022-11-16 DIAGNOSIS — I27.0 PRIMARY PULMONARY HYPERTENSION (HCC): ICD-10-CM

## 2022-11-16 DIAGNOSIS — N18.31 STAGE 3A CHRONIC KIDNEY DISEASE (HCC): ICD-10-CM

## 2022-11-16 PROCEDURE — G8484 FLU IMMUNIZE NO ADMIN: HCPCS | Performed by: INTERNAL MEDICINE

## 2022-11-16 PROCEDURE — 99214 OFFICE O/P EST MOD 30 MIN: CPT | Performed by: INTERNAL MEDICINE

## 2022-11-16 PROCEDURE — 93000 ELECTROCARDIOGRAM COMPLETE: CPT | Performed by: INTERNAL MEDICINE

## 2022-11-16 PROCEDURE — G8427 DOCREV CUR MEDS BY ELIG CLIN: HCPCS | Performed by: INTERNAL MEDICINE

## 2022-11-16 PROCEDURE — 3075F SYST BP GE 130 - 139MM HG: CPT | Performed by: INTERNAL MEDICINE

## 2022-11-16 PROCEDURE — 1123F ACP DISCUSS/DSCN MKR DOCD: CPT | Performed by: INTERNAL MEDICINE

## 2022-11-16 PROCEDURE — G8417 CALC BMI ABV UP PARAM F/U: HCPCS | Performed by: INTERNAL MEDICINE

## 2022-11-16 PROCEDURE — 1036F TOBACCO NON-USER: CPT | Performed by: INTERNAL MEDICINE

## 2022-11-16 PROCEDURE — 1090F PRES/ABSN URINE INCON ASSESS: CPT | Performed by: INTERNAL MEDICINE

## 2022-11-16 PROCEDURE — G8400 PT W/DXA NO RESULTS DOC: HCPCS | Performed by: INTERNAL MEDICINE

## 2022-11-16 PROCEDURE — 3078F DIAST BP <80 MM HG: CPT | Performed by: INTERNAL MEDICINE

## 2022-11-16 NOTE — PROGRESS NOTES
[address] [facility], [address] [place] [address]  PHONE: [phone]     22    NAME:  Paulino Julian  : 1940  MRN: [id number]       SUBJECTIVE:   Paulino Julian is a 80 y.o. female seen for a follow up visit regarding the following:     Chief Complaint   Patient presents with    Cardiomyopathy    Congestive Heart Failure    Hypertension       HPI: Here for pHTN (Echo 6/15 RV sig enlarged, RVSP 110)   severe PVC burden in past (prior VT ablation , intolerant to amio, Ic agents)   prior ITP, pulm fibrosis      Off letaris d/t  increasing LFTs. LHC 3/9/18: mild CAD, normal EF     [facility] Echo 2019 [facility]: low normal EF, RVSP 51    2019 S admission for UTI, C diff. Echo 2020 and 2022: normal EF, mild TR      Off walker and oxygen, seeing Dr. Zuleima Mars with [facility]. Seeing Dr. Kelsey Lutz with Ortho as well. Exercising more now. Some ROSA, but better. Still on lasix 20mg and daily 12.5 to 25 aldactone. Following weight. No edema, ROSA. Feeling well, no CP or pressure. Patient denies recent history of orthopnea, PND, excessive dizziness and/or syncope. Struggled with multiple different meds over the yrs, Dr. Zuleima Mars has tried multiple in the past.         Past Medical History, Past Surgical History, Family history, Social History, and Medications were all reviewed with the patient today and updated as necessary.      Current Outpatient Medications   Medication Sig Dispense Refill    estradiol (ESTRACE) 0.5 MG tablet Take 1 tablet by mouth daily 90 tablet 4    Tadalafil, PAH, 20 MG tablet Take 2 tablets by mouth daily 60 tablet 11    MILK THISTLE PO Take by mouth      Zinc Acetate, Oral, (ZINC ACETATE PO) Take by mouth daily      acetaminophen (TYLENOL) 500 MG tablet Take 1,000 mg by mouth every 6 hours as needed      albuterol sulfate  (90 Base) MCG/ACT inhaler Inhale 1 puff into the lungs      Cholecalciferol 50 MCG (2000 UT) TABS Take 2,000 Units by mouth daily      furosemide (LASIX) 20 MG tablet Take 20 mg by mouth daily      levothyroxine (SYNTHROID) 112 MCG tablet Take 112 mcg by mouth every morning (before breakfast)      loperamide (IMODIUM) 2 MG capsule Take 2 mg by mouth as needed      magnesium oxide (MAG-OX) 400 MG tablet Take 400 mg by mouth daily      ondansetron (ZOFRAN-ODT) 4 MG disintegrating tablet Take 4 mg by mouth every 6 hours as needed      spironolactone (ALDACTONE) 25 MG tablet Take 25 mg by mouth Takes 1/2- 1 tablet daily       No current facility-administered medications for this visit. Allergies   Allergen Reactions    Esomeprazole Magnesium Hives and Rash    Dextromethorphan Rash     Patient told me this information on a call for medication reconciliation.     Amitriptyline Hives    Amlodipine Hives    Amoxicillin Hives and Other (See Comments)    Hydrochlorothiazide Hives    Ibuprofen Hives    Iodine Other (See Comments)    Levofloxacin Hives    Lisinopril Hives    Nebivolol Hives    Nitrofurantoin Hives and Other (See Comments)    Olmesartan Hives    Sulfa Antibiotics Hives    Cephalexin Hives and Rash    Flecainide Rash    Gabapentin Nausea And Vomiting    Lisinopril-Hydrochlorothiazide Rash    Nitrofurantoin Macrocrystal Rash    Tramadol Nausea And Vomiting     Patient Active Problem List    Diagnosis Date Noted    Chronic renal disease, stage III [facility]) [261239] 10/20/2022     Priority: Medium    Postural dizziness with presyncope 07/16/2019    Chronic respiratory failure with hypoxia ([address] 04/08/2019    Dilated cardiomyopathy ([facility] 81/04/0932    Diastolic CHF, chronic ([address] 11/30/2018    Mild reactive airways disease 11/28/2018    Left lower lobe pneumonia 07/23/2018    Acute conjunctivitis 07/23/2018    Elevated troponin 07/23/2018    Pneumonia 07/23/2018    Shoulder blade pain 04/19/2018    Primary osteoarthritis involving multiple joints 01/09/2018    Hypovitaminosis D 01/09/2018    Collagenous colitis 01/09/2018 Cough 06/26/2017    Insomnia 08/24/2016    Nausea and vomiting 06/13/2016     recurrent, uncertain etiology        Shortness of breath 11/10/2015    Obesity (BMI 30.0-34.9) 11/10/2015    Postmenopausal 11/10/2015    Acquired hypothyroidism 11/10/2015    Undifferentiated connective tissue disease ([facility] 08/17/2015     Last Assessment & Plan:   Her lab work is consistent with an undifferentiated connective tissue   disease. Her RNP is positive along with her CRUZ antibody which is positive   for homogeneous nucleolar pattern. She does have Raynaud's phenomenon and   reflux. Is unclear what exactly the pattern this is        Primary pulmonary hypertension ([facility] 08/17/2015             Gastroesophageal reflux disease 08/17/2015     Last Assessment & Plan:   Continue over-the-counter PPI        Postnasal drip 08/17/2015     Last Assessment & Plan: We have discussed using sinus rinses for her postnasal drip and also using   that AYR saline gel for while she is using her oxygen. Should those not   work then we will have to come up with another solution        Pulmonary hypertension ([facility] 06/15/2015     Pulmonary Hypertension: had heart cath 4/16/15. At that time her mPAP was only 22, though her systolic PAP was elevated in   the 50's and her PVR was elevated at 6. The low mPAP at that time is not   conistent with the current TTE findings of RVSP of 110. She has an autoimmune process that according to her has not been labelled   as a specific entity by her rheumatologists in the past.   [address] with vaso testing:  BASELINE MEASUREMENTS:  Pulmonary artery 70/30, mean of 40. Pulmonary artery saturation 70%. Cardiac output 3.2 L/min. Cardiac index 1.8 L/min per meter squared. Adenosine 10 mcg/kg per minute: Pulmonary artery 65/30, mean 40. Cardiac  output 3.1 L/min. Cardiac index 1.8 L/min. Pulmonary artery saturation  74%. Adenosine 30 mcg/kg per minute: Pulmonary artery 50/28, mean 38. Cardiac output 3.4 L/min.   Cardiac index 2.0 L/min per meter squared. Pulmonary artery saturation 76%. Adenosine 50 mcg/kg per minute: Pulmonary artery 48/22, mean pulmonary  artery 37. Cardiac output 3.8 L/min. Cardiac index 2.15 L/min per meter squared. Pulmonary artery 77%. Pulmonary capillary wedge pressure is 5-6 mmHg. Mean transpulmonary gradient at baseline 35, without significant change  at peak adenosine. Non resposive PAHTN ( MPAP should decrease by at least 10 and to below 40   mmHg)  CaCb likely not beneficial.        Hyponatremia 06/11/2015    Dyspnea 06/11/2015    Sicca syndrome, Sjogren's ([facility] 04/09/2015    Hypoxemia 07/16/2014     Last Assessment & Plan: We will again try and get her an OCD for when she is walking around. PVC (premature ventricular contraction) 04/25/2014     Last Assessment & Plan: This has improved significantly since the last time I've seen her. I   continue to believe that this is secondary to her right ventricular   dilation. I'm hoping that when her right ventricle is more happy that her   PVCs we'll decrease the number.         Raynaud's disease 04/09/2014    Lung nodule 04/09/2014     Initial CT scan in \[facility]"" 11/15/2005--10 mm nodule in right mid   lung; PET negative 6/13/2011  CT  10/16/2011 @ [place], [place]--Multiple right lung   nodules without adenopathy        Dyspnea on exertion 04/09/2014    Pulmonary fibrosis ([facility] 04/09/2014     Initial CT scan in \[facility]\""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        Arthritis 04/09/2014    Hyperlipidemia 04/09/2014    Hypertension 04/09/2014      Past Surgical History:   Procedure Laterality Date    APPENDECTOMY      COLONOSCOPY W/BIOPSY SINGLE/MULTIPLE  12/13/2013         HERNIA REPAIR      bilat    LAPAROSCOPIC HYSTERECTOMY      ORTHOPEDIC SURGERY      spinal/transplant 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    TONSILLECTOMY AND ADENOIDECTOMY       Family History   Problem Relation Age of Onset    Breast Cancer Mother 47    Cancer Mother ovarian    Heart Disease Father         MI age 73/ valve replaced    Hypertension Father     Colon Cancer Neg Hx      Social History     Tobacco Use    Smoking status: Never    Smokeless tobacco: Never   Substance Use Topics    Alcohol use: Not Currently         ROS:    No obvious pertinent positives on review of systems except for what was outlined in the HPI today. PHYSICAL EXAM:     /82   Pulse 68   Ht 5' 1\" (1.549 m)   Wt 161 lb 6.4 oz (73.2 kg)   BMI 30.50 kg/m²    General/Constitutional:   Alert and oriented x 3, no acute distress  HEENT:   normocephalic, atraumatic, moist mucous membranes  Neck:   No JVD or carotid bruits bilaterally  Cardiovascular:   regular rate and rhythm, no murmur/rub/gallop appreciated  Pulmonary:   clear to auscultation bilaterally, no respiratory distress  Abdomen:   soft, non-tender, non-distended  Ext:   No sig LE edema bilaterally  Skin:  warm and dry, no obvious rashes seen  Neuro:   no obvious sensory or motor deficits  Psychiatric:   normal mood and affect    EKG Today and independently reviewed by me: sinus rhythm, RBBB and non-specific ST/T wave changes.       Lab Results   Component Value Date/Time     05/04/2022 12:14 PM    K 4.7 05/04/2022 12:14 PM    CL 96 05/04/2022 12:14 PM    CO2 28 05/04/2022 12:14 PM    BUN 18 05/04/2022 12:14 PM    CREATININE 1.10 05/04/2022 12:14 PM    GLUCOSE 81 05/04/2022 12:14 PM    CALCIUM 9.8 05/04/2022 12:14 PM        Lab Results   Component Value Date    WBC 7.9 05/04/2022    HGB 14.1 05/04/2022    HCT 43.4 05/04/2022    MCV 95.2 05/04/2022     05/04/2022       Lab Results   Component Value Date    TSH 0.426 05/04/2022       No results found for: LABA1C  No results found for: EAG    Lab Results   Component Value Date    CHOL 173 06/09/2021    CHOL 196 02/16/2021    CHOL 183 09/01/2020     Lab Results   Component Value Date    TRIG 69 06/09/2021    TRIG 95 02/16/2021    TRIG 111 09/01/2020     Lab Results Component Value Date    HDL 63 06/09/2021    HDL 63 02/16/2021    HDL 59 09/01/2020     Lab Results   Component Value Date    LDLCALC 97 06/09/2021    LDLCALC 116 (H) 02/16/2021    LDLCALC 104 (H) 09/01/2020     Lab Results   Component Value Date    LABVLDL 24 06/01/2020    LABVLDL 21 02/18/2020    LABVLDL 19 11/14/2019    VLDL 13 06/09/2021    VLDL 17 02/16/2021    VLDL 20 09/01/2020     No results found for: RIGO        I have Independently reviewed prior care notes, any ER records available, cardiac testing, labs and results with the patient and before seeing the patient today. Also independently reviewed outside records when available. ASSESSMENT:    Abby Brown was seen today for cardiomyopathy, congestive heart failure and hypertension. Diagnoses and all orders for this visit:    Dilated cardiomyopathy ([facility]  -     EKG 12 Lead    Chronic diastolic (congestive) heart failure (HCC)  -     EKG 12 Lead  -     Comprehensive Metabolic Panel; Future  -     CBC; Future  -     Lipid Panel; Future  -     TSH; Future  -     Magnesium; Future    Diastolic CHF, chronic (HCC)    Postural dizziness with presyncope    Primary pulmonary hypertension (HCC)    Pulmonary hypertension (HCC)    PVC (premature ventricular contraction)    Chronic respiratory failure with hypoxia (HCC)  -     Comprehensive Metabolic Panel; Future  -     CBC; Future  -     Lipid Panel; Future  -     TSH; Future  -     Magnesium; Future    Stage 3a chronic kidney disease (HCC)        PLAN:      1. HTN: remain on meds, follow. She has white coat HTN. Labs are good, Na better now. 2. CM/pHTN:   Remain on lasix and PRN aldactone, 12.5 to 25mg. Normal RV on echo. No TR or pHTN on echo. Doing well now. Reviewed diuretic plan. Check labs today,  Check Na. Call her with labs. Heart failure history and medications were reviewed with the patient today.   Action plan for diuretics was reviewed (if needed) with the

## 2022-11-17 LAB
ALBUMIN SERPL-MCNC: 3.8 G/DL (ref 3.2–4.6)
ALBUMIN/GLOB SERPL: 1.2 {RATIO} (ref 0.4–1.6)
ALP SERPL-CCNC: 54 U/L (ref 50–136)
ALT SERPL-CCNC: 19 U/L (ref 12–65)
ANION GAP SERPL CALC-SCNC: 11 MMOL/L (ref 2–11)
AST SERPL-CCNC: 17 U/L (ref 15–37)
BILIRUB SERPL-MCNC: 0.5 MG/DL (ref 0.2–1.1)
BUN SERPL-MCNC: 15 MG/DL (ref 8–23)
CALCIUM SERPL-MCNC: 9.7 MG/DL (ref 8.3–10.4)
CHLORIDE SERPL-SCNC: 96 MMOL/L (ref 101–110)
CHOLEST SERPL-MCNC: 196 MG/DL
CO2 SERPL-SCNC: 24 MMOL/L (ref 21–32)
CREAT SERPL-MCNC: 1.2 MG/DL (ref 0.6–1)
ERYTHROCYTE [DISTWIDTH] IN BLOOD BY AUTOMATED COUNT: 13.4 % (ref 11.9–14.6)
GLOBULIN SER CALC-MCNC: 3.3 G/DL (ref 2.8–4.5)
GLUCOSE SERPL-MCNC: 99 MG/DL (ref 65–100)
HCT VFR BLD AUTO: 43.7 % (ref 35.8–46.3)
HDLC SERPL-MCNC: 56 MG/DL (ref 40–60)
HDLC SERPL: 3.5 {RATIO}
HGB BLD-MCNC: 14.1 G/DL (ref 11.7–15.4)
LDLC SERPL CALC-MCNC: 119 MG/DL
MAGNESIUM SERPL-MCNC: 2 MG/DL (ref 1.8–2.4)
MCH RBC QN AUTO: 30.9 PG (ref 26.1–32.9)
MCHC RBC AUTO-ENTMCNC: 32.3 G/DL (ref 31.4–35)
MCV RBC AUTO: 95.8 FL (ref 82–102)
NRBC # BLD: 0 K/UL (ref 0–0.2)
PLATELET # BLD AUTO: 298 K/UL (ref 150–450)
PMV BLD AUTO: 11 FL (ref 9.4–12.3)
POTASSIUM SERPL-SCNC: 4.4 MMOL/L (ref 3.5–5.1)
PROT SERPL-MCNC: 7.1 G/DL (ref 6.3–8.2)
RBC # BLD AUTO: 4.56 M/UL (ref 4.05–5.2)
SODIUM SERPL-SCNC: 131 MMOL/L (ref 133–143)
TRIGL SERPL-MCNC: 105 MG/DL (ref 35–150)
TSH, 3RD GENERATION: 0.71 UIU/ML (ref 0.36–3.74)
VLDLC SERPL CALC-MCNC: 21 MG/DL (ref 6–23)
WBC # BLD AUTO: 7.8 K/UL (ref 4.3–11.1)

## 2022-12-07 RX ORDER — FUROSEMIDE 20 MG/1
20 TABLET ORAL DAILY
Qty: 90 TABLET | Refills: 3 | Status: SHIPPED | OUTPATIENT
Start: 2022-12-07

## 2022-12-07 NOTE — TELEPHONE ENCOUNTER
----- Message from David Robles DO sent at 12/6/2022  4:48 PM EST -----  Please send in her lasix 20 daily, 90 tabs for 12 mos. To [email or web address]. Thanks!

## 2022-12-23 ENCOUNTER — OFFICE VISIT (OUTPATIENT)
Dept: UROLOGY | Age: 82
End: 2022-12-23

## 2022-12-23 DIAGNOSIS — N39.0 RECURRENT UTI: Primary | ICD-10-CM

## 2022-12-23 DIAGNOSIS — N30.20 CHRONIC CYSTITIS: ICD-10-CM

## 2022-12-23 LAB
BILIRUBIN, URINE, POC: NEGATIVE
BLOOD URINE, POC: ABNORMAL
GLUCOSE URINE, POC: 100
KETONES, URINE, POC: NEGATIVE
LEUKOCYTE ESTERASE, URINE, POC: ABNORMAL
NITRITE, URINE, POC: POSITIVE
PH, URINE, POC: 6.5 (ref 4.6–8)
PROTEIN,URINE, POC: ABNORMAL
SPECIFIC GRAVITY, URINE, POC: 1.01 (ref 1–1.03)
URINALYSIS CLARITY, POC: ABNORMAL
URINALYSIS COLOR, POC: ABNORMAL
UROBILINOGEN, POC: ABNORMAL

## 2022-12-23 RX ORDER — TRIMETHOPRIM 100 MG/1
100 TABLET ORAL 2 TIMES DAILY
Qty: 20 TABLET | Refills: 0 | Status: SHIPPED | OUTPATIENT
Start: 2022-12-23 | End: 2023-01-02

## 2022-12-23 NOTE — PROGRESS NOTES
UA - Dipstick  Results for orders placed or performed in visit on 12/23/22   AMB POC URINALYSIS DIP STICK AUTO W/O MICRO   Result Value Ref Range    Color (UA POC)      Clarity (UA POC)      Glucose, Urine,  Negative    Bilirubin, Urine, POC Negative Negative    KETONES, Urine, POC Negative Negative    Specific Gravity, Urine, POC 1.010 1.001 - 1.035    Blood (UA POC) Trace Negative    pH, Urine, POC 6.5 4.6 - 8.0    Protein, Urine, POC Trace Negative    Urobilinogen, POC 1 mg/dL     Nitrite, Urine, POC Positive Negative    Leukocyte Esterase, Urine, POC Large Negative   Results for orders placed or performed in visit on 11/03/22   AMB POC URINALYSIS DIP STICK AUTO W/O MICRO   Result Value Ref Range    Color, Urine, POC Yellow     Clarity, Urine, POC clear     Glucose, Urine,  Negative    Bilirubin, Urine, POC Negative Negative    Ketones, Urine, POC Negative Negative    Specific Gravity, Urine, POC 1.015 1.001 - 1.035    Blood, Urine, POC small Negative    pH, Urine, POC 6.5 4.6 - 8.0    Protein, Urine, POC Trace Negative    Urobilinogen, POC 0.2     Nitrate, Urine, POC pos Negative    Leukocyte Esterase, Urine, POC Large Negative       UA - Micro  WBC -   RBC - 5-10  Bacteria - 2+  Epith - 0      Requested Prescriptions     Signed Prescriptions Disp Refills    trimethoprim (TRIMPEX) 100 MG tablet 20 tablet 0     Sig: Take 1 tablet by mouth 2 times daily for 10 days     Plan    Diagnosis Orders   1. Recurrent UTI  AMB POC URINALYSIS DIP STICK AUTO W/O MICRO    Culture, Urine      2. Chronic cystitis  AMB POC URINALYSIS DIP STICK AUTO W/O MICRO        Ucx sent. I started pt empirically on TMP 100mg po bid #20.

## 2022-12-25 LAB
BACTERIA SPEC CULT: ABNORMAL
BACTERIA SPEC CULT: ABNORMAL
SERVICE CMNT-IMP: ABNORMAL

## 2023-02-02 ENCOUNTER — TELEPHONE (OUTPATIENT)
Dept: PULMONOLOGY | Age: 83
End: 2023-02-02

## 2023-02-02 ENCOUNTER — OFFICE VISIT (OUTPATIENT)
Dept: UROLOGY | Age: 83
End: 2023-02-02
Payer: MEDICARE

## 2023-02-02 DIAGNOSIS — N39.0 RECURRENT UTI: Primary | ICD-10-CM

## 2023-02-02 DIAGNOSIS — N30.00 ACUTE CYSTITIS WITHOUT HEMATURIA: ICD-10-CM

## 2023-02-02 LAB
BILIRUBIN, URINE, POC: NEGATIVE
BLOOD URINE, POC: ABNORMAL
GLUCOSE URINE, POC: NEGATIVE
KETONES, URINE, POC: NEGATIVE
LEUKOCYTE ESTERASE, URINE, POC: ABNORMAL
NITRITE, URINE, POC: POSITIVE
PH, URINE, POC: 7 (ref 4.6–8)
PROTEIN,URINE, POC: ABNORMAL
SPECIFIC GRAVITY, URINE, POC: 1.01 (ref 1–1.03)
URINALYSIS CLARITY, POC: ABNORMAL
URINALYSIS COLOR, POC: ABNORMAL
UROBILINOGEN, POC: ABNORMAL

## 2023-02-02 PROCEDURE — 1090F PRES/ABSN URINE INCON ASSESS: CPT | Performed by: NURSE PRACTITIONER

## 2023-02-02 PROCEDURE — 81003 URINALYSIS AUTO W/O SCOPE: CPT | Performed by: NURSE PRACTITIONER

## 2023-02-02 PROCEDURE — G8484 FLU IMMUNIZE NO ADMIN: HCPCS | Performed by: NURSE PRACTITIONER

## 2023-02-02 PROCEDURE — G8400 PT W/DXA NO RESULTS DOC: HCPCS | Performed by: NURSE PRACTITIONER

## 2023-02-02 PROCEDURE — 1123F ACP DISCUSS/DSCN MKR DOCD: CPT | Performed by: NURSE PRACTITIONER

## 2023-02-02 PROCEDURE — G8427 DOCREV CUR MEDS BY ELIG CLIN: HCPCS | Performed by: NURSE PRACTITIONER

## 2023-02-02 PROCEDURE — 99214 OFFICE O/P EST MOD 30 MIN: CPT | Performed by: NURSE PRACTITIONER

## 2023-02-02 PROCEDURE — 1036F TOBACCO NON-USER: CPT | Performed by: NURSE PRACTITIONER

## 2023-02-02 PROCEDURE — G8417 CALC BMI ABV UP PARAM F/U: HCPCS | Performed by: NURSE PRACTITIONER

## 2023-02-02 RX ORDER — TRIMETHOPRIM 100 MG/1
100 TABLET ORAL 2 TIMES DAILY
Qty: 20 TABLET | Refills: 0 | Status: SHIPPED | OUTPATIENT
Start: 2023-02-02 | End: 2023-02-12

## 2023-02-02 ASSESSMENT — ENCOUNTER SYMPTOMS
RESPIRATORY NEGATIVE: 1
EYES NEGATIVE: 1

## 2023-02-02 NOTE — PROGRESS NOTES
[facility]  [address] [address] [facility], [address] [facility]  [phone]          Justina Weiner  : 1940    Chief Complaint   Patient presents with    Urinary Tract Infection          HPI     Justina Weiner is a 80 y.o. female followed for recurrent UTI's and most recently, bladder lesion. Had gross hematuria in 2018. Had neg work up on 2018 other than L renal atrophy and atrophic vaginitis. Started on Estrace cream twice wkly. she is now using this PRN. Stable mild cystocele by report. Recently had recurrence of pelvic pressure and dysuria. Ucx on 20 grew Klebsiella but no recent positive culture results. Reported improvement with abx and urogesic blue. Previous bladder biopsies in  showed cystitis. CT on 21 shows L renal atrophy and pulm fibrosis with normal upper tracts. [address]. Cyst in office 21 showed PVR small. Pelvic exam was unremarkable. Scattered areas of cystitis noted. Bladder lesion noted. She went to the OR 21 for cystoscopy, bladder biopsies, fulguration of abnormal bladder urothelium, bilateral retrograde pyelograms. RGP WNL. Bladder bx benign. She has had recent recurrence of UTI. Positive urine culture 22 and 11/3/22. She had another UTI confirmed w culture 22. She is back today for UTI sx including dysuria and urinary frequency. No fever/chills, n/v, flank pain, or gross hematuria.       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    Congestive heart failure (CHF) (HCC)     diastolic HF    Dyspnea  Dyspnea on exertion 4/9/2014    GERD (gastroesophageal reflux disease)     takes Tums    History of ITP     2012 in [facility] for treatments-last one Nov '12    Hyperlipidemia 4/9/2014    Hypertension 4/9/2014    medications    Hyponatremia 6/11/2015    Hypothyroid     medication    Hypothyroidism 6/11/2015    Hypoxemia 7/16/2014    Left atrial dilation     Liver disease     elevated enzymes- takes milk thistle    Lung nodule 4/9/2014    [facility]--10 mm nodule in right mid lung; PET negative 6/13/2011 CT  10/16/2011 @ [place], [place]--Multiple right lung nodules without adenopathy     MI (myocardial infarction) ([facility]     2009- no intervention- mild/ EF per echo 9/5/13= 40%    Nausea and vomiting 6/13/2016    denies    Other ill-defined conditions(799.89)     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    Pulmonary fibrosis ([address] 4/9/2014    [facility] [place] [facility]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     Pulmonary hypertension (HCC)     O2 2.5 L prn with activity     Pulmonary valve regurgitation     PVC (premature ventricular contraction) 4/25/2014    Raynaud's disease 4/9/2014    Right atrial dilation     Severe tricuspid regurgitation     Shingles 01/27/2019    Sicca syndrome, Sjogren's ([address] 4/9/2015    Thrombocytopenia ([address] 10/12/2015     Past Surgical History:   Procedure Laterality Date    APPENDECTOMY      COLONOSCOPY W/BIOPSY SINGLE/MULTIPLE  12/13/2013         HERNIA REPAIR      bilat    LAPAROSCOPIC HYSTERECTOMY      ORTHOPEDIC SURGERY      spinal/transplant 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    TONSILLECTOMY AND ADENOIDECTOMY       Current Outpatient Medications   Medication Sig Dispense Refill    furosemide (LASIX) 20 MG tablet Take 1 tablet by mouth daily 90 tablet 3    estradiol (ESTRACE) 0.5 MG tablet Take 1 tablet by mouth daily 90 tablet 4    Tadalafil, PAH, 20 MG tablet Take 2 tablets by mouth daily 60 tablet 11    MILK THISTLE PO Take by mouth      Zinc Acetate, Oral, (ZINC ACETATE PO) Take by mouth daily      acetaminophen (TYLENOL) 500 MG tablet Take 1,000 mg by mouth every 6 hours as needed      albuterol sulfate  (90 Base) MCG/ACT inhaler Inhale 1 puff into the lungs      Cholecalciferol 50 MCG (2000 UT) TABS Take 2,000 Units by mouth daily      levothyroxine (SYNTHROID) 112 MCG tablet Take 112 mcg by mouth every morning (before breakfast)      loperamide (IMODIUM) 2 MG capsule Take 2 mg by mouth as needed      magnesium oxide (MAG-OX) 400 MG tablet Take 400 mg by mouth daily      ondansetron (ZOFRAN-ODT) 4 MG disintegrating tablet Take 4 mg by mouth every 6 hours as needed      spironolactone (ALDACTONE) 25 MG tablet Take 25 mg by mouth Takes 1/2- 1 tablet daily       No current facility-administered medications for this visit. Allergies   Allergen Reactions    Esomeprazole Magnesium Hives and Rash    Dextromethorphan Rash     Patient told me this information on a call for medication reconciliation. Amitriptyline Hives    Amlodipine Hives    Amoxicillin Hives and Other (See Comments)    Hydrochlorothiazide Hives    Ibuprofen Hives    Iodine Other (See Comments)    Levofloxacin Hives    Lisinopril Hives    Nebivolol Hives    Nitrofurantoin Hives and Other (See Comments)    Olmesartan Hives    Sulfa Antibiotics Hives    Cephalexin Hives and Rash    Flecainide Rash    Gabapentin Nausea And Vomiting    Lisinopril-Hydrochlorothiazide Rash    Nitrofurantoin Macrocrystal Rash    Tramadol Nausea And Vomiting     Social History     Socioeconomic History    Marital status:       Spouse name: Not on file    Number of children: Not on file    Years of education: Not on file    Highest education level: Not on file   Occupational History    Not on file   Tobacco Use    Smoking status: Never    Smokeless tobacco: Never   Substance and Sexual Activity    Alcohol use: Not Currently    Drug use: No    Sexual activity: Not on file   Other Topics Concern Not on file   Social History Narrative     and lives alone. Daughter lives in [place]. No pets. Retired, previously worked as an . Social Determinants of Health     Financial Resource Strain: Not on file   Food Insecurity: Not on file   Transportation Needs: Not on file   Physical Activity: Not on file   Stress: Not on file   Social Connections: Not on file   Intimate Partner Violence: Not on file   Housing Stability: Not on file     Family History   Problem Relation Age of Onset    Breast Cancer Mother 47    Cancer Mother         ovarian    Heart Disease Father         MI age 73/ valve replaced    Hypertension Father     Colon Cancer Neg Hx        Review of Systems  Constitutional: Negative  Skin: Negative skin ROS  Eyes: Eyes negative  ENT: HENT negative  Respiratory: Respiratory negative  Cardiovascular: Neg cardio ROS  GI: Neg GI ROS  Genitourinary: Positive for urinary burning and frequent urination.   Musculoskeletal: Musculoskeletal negative  Psychological: Neg psych ROS  Endocrine: Endocrine negative  Hem/Lymphatic: Hematologic/lymphatic negative    Urinalysis  UA - Dipstick  Results for orders placed or performed in visit on 02/02/23   AMB POC URINALYSIS DIP STICK AUTO W/O MICRO   Result Value Ref Range    Color (UA POC)      Clarity (UA POC)      Glucose, Urine, POC Negative Negative    Bilirubin, Urine, POC Negative Negative    KETONES, Urine, POC Negative Negative    Specific Gravity, Urine, POC 1.010 1.001 - 1.035    Blood (UA POC) Trace-intact Negative    pH, Urine, POC 7.0 4.6 - 8.0    Protein, Urine, POC Trace Negative    Urobilinogen, POC 2 mg/dL     Nitrite, Urine, POC Positive Negative    Leukocyte Esterase, Urine, POC Large Negative   Results for orders placed or performed in visit on 11/03/22   AMB POC URINALYSIS DIP STICK AUTO W/O MICRO   Result Value Ref Range    Color, Urine, POC Yellow     Clarity, Urine, POC clear     Glucose, Urine,  Negative    Bilirubin, Urine, POC Negative Negative    Ketones, Urine, POC Negative Negative    Specific Gravity, Urine, POC 1.015 1.001 - 1.035    Blood, Urine, POC small Negative    pH, Urine, POC 6.5 4.6 - 8.0    Protein, Urine, POC Trace Negative    Urobilinogen, POC 0.2     Nitrate, Urine, POC pos Negative    Leukocyte Esterase, Urine, POC Large Negative       PHYSICAL EXAM    General appearance - well appearing and in no distress  Mental status - alert, oriented to person, place, and time  Neck - supple, no significant adenopathy  Chest/Lung-  Quiet, even and easy respiratory effort without use of accessory muscles  Skin - normal coloration and turgor, no rashes      Assessment and Plan    ICD-10-CM    1. Recurrent UTI  N39.0 AMB POC URINALYSIS DIP STICK AUTO W/O MICRO     Culture, Urine      2. Acute cystitis without hematuria  N30.00           Recurrent UTI/Acute UTI- urine infected. Culture pending. Start trimethoprim 100 mg PO BID x 10 days. Patient will begin daily suppressive antibiotic. Potential side effects were discussed. The plan will be to hopefully be able to wean this in 3-4 months to 3 times weekly and eventually stop it altogether. We discussed ADAM. She opts to [facility] for now. RTO in 4-6 weeks for follow up with me. Advised to call sooner if sx worsen. Celina Salazar, YOLYP, APRN - CNP  Dr. Holland Mclain is supervising physician today and he approves plan of care.

## 2023-02-02 NOTE — TELEPHONE ENCOUNTER
Patient called the office stating her Tadalafil was needing a PA.     PA for Tadalafil sent to [facility] via [address] [place] [address] Rd: [address] regarding your request  Authorization already on file for this request. Authorization starting on 01/01/2023 and ending on 12/31/2023

## 2023-02-08 ENCOUNTER — TELEPHONE (OUTPATIENT)
Dept: UROLOGY | Age: 83
End: 2023-02-08

## 2023-02-08 NOTE — TELEPHONE ENCOUNTER
Culture has not resulted from 2/2/23. Can she come to office today or tomorrow for repeat culture?     Elida Payton, CORTEZ, APRN - CNP

## 2023-02-08 NOTE — TELEPHONE ENCOUNTER
Pt stated she is still not having any relief from medication and it has been 6 days. Pt states she getting no rest because she us up numerous times at night to go to the bathroom. Pt stated she is taking AZO because she is still having burning with urination.

## 2023-02-08 NOTE — TELEPHONE ENCOUNTER
Pt returned call on urgent line and was given information regarding coming in tomorrow for specimen. Pt acknowledged understanding.

## 2023-02-09 ENCOUNTER — OFFICE VISIT (OUTPATIENT)
Dept: UROLOGY | Age: 83
End: 2023-02-09

## 2023-02-09 DIAGNOSIS — N39.0 RECURRENT UTI: ICD-10-CM

## 2023-02-09 DIAGNOSIS — N30.00 ACUTE CYSTITIS WITHOUT HEMATURIA: Primary | ICD-10-CM

## 2023-02-09 LAB
BILIRUBIN, URINE, POC: NEGATIVE
BLOOD URINE, POC: NEGATIVE
GLUCOSE URINE, POC: NEGATIVE
KETONES, URINE, POC: NEGATIVE
LEUKOCYTE ESTERASE, URINE, POC: NORMAL
NITRITE, URINE, POC: NEGATIVE
PH, URINE, POC: 7 (ref 4.6–8)
PROTEIN,URINE, POC: NEGATIVE
SPECIFIC GRAVITY, URINE, POC: 1.02 (ref 1–1.03)
URINALYSIS CLARITY, POC: CLEAR
URINALYSIS COLOR, POC: YELLOW
UROBILINOGEN, POC: 0.2

## 2023-02-09 RX ORDER — GENTAMICIN SULFATE 40 MG/ML
80 INJECTION, SOLUTION INTRAMUSCULAR; INTRAVENOUS ONCE
Status: SHIPPED | OUTPATIENT
Start: 2023-02-10

## 2023-02-09 NOTE — PROGRESS NOTES
Results for orders placed or performed in visit on 02/09/23   AMB POC URINALYSIS DIP STICK AUTO W/O MICRO   Result Value Ref Range    Color, Urine, POC yellow     Clarity, Urine, POC clear     Glucose, Urine, POC Negative Negative    Bilirubin, Urine, POC Negative Negative    Ketones, Urine, POC Negative Negative    Specific Gravity, Urine, POC 1.025 1.001 - 1.035    Blood, Urine, POC negative Negative    pH, Urine, POC 7.0 4.6 - 8.0    Protein, Urine, POC Negative Negative    Urobilinogen, POC 0.2     Nitrate, Urine, POC negative Negative    Leukocyte Esterase, Urine, POC Large Negative       Urine infected. Culture pending. Pt completed trimethoprim wo relief. Prev culture was lost. She would like to try different antibiotic. Discussed Cefdinir (possible allergy) vs Gentamycin injection tomorrow. Risks, benefits, and alternatives reviewed. She opts to have Gentamycin 80 mg IM once injection tomorrow.     Seun Agrawal, FNP, APRN - CNP

## 2023-02-11 LAB
BACTERIA SPEC CULT: NORMAL
SERVICE CMNT-IMP: NORMAL

## 2023-02-20 NOTE — PROGRESS NOTES
Dr. Alessandro MD  [address] [facility]. [address], [address] [facility]  [phone]    Patient Name:  Cathy Torres        YOB: 1940    Office Visit 2/23/2023    ASSESSMENT AND PLAN:  (Medical Decision Making)    1. Pulmonary hypertension ([address]    Patient seems to have primarily precapillary Group 1 PH though there is underlying ILD present on CT abd/pelvis and elevated CRUZ,  RNP Ab and SSA Ab. She has not tolerated ambrisentan due to liver enzyme elevation. Recommend initiation of tyvaso at this point based on hemodynamics and Functional Class 3 symptoms. Also recommend pulmonary rehab and she had an upcoming orientation there. F/u in 3 mos with 6MWT and we will repeat pulmonary function testing in October. 2. ILD (interstitial lung disease) ([address]  Suspect CTD-ILD and will follow up after rheumatology evaluation and HRCT. Recommend that we consider tyvaso in her case. She is concerned about the risk of coughing. 3. Positive CRUZ (antinuclear antibody)  Referral to rheum placed. Has a positive SSA and positive RNP antibodies. no appointment until June. 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 Given stability of lung function and lack of severe joint disease, initiation of therapy is not urgent. Rosa M Naqvi MD  Electronically signed    Clinical time for encounter was 45minutes. _________________________________________________________________________    HISTORY OF PRESENT ILLNESS:    Cathy Torres a 80 y.o. female with a PMH significant for Group 1 pulmonary hypertension, Raynaud's, positive CRUZ, ITP, PVCs, coronary artery disease, obesity who has been followed by Dr. Myrtle Huerta cardiology and Dr. Rolando Shelton for pulmonary hypertension. She reports that her pulmonary hypertension began in 2011. She currently wears 2 L/min of supplemental oxygen continuously and is on tadalafil 40 mg daily. She is also maintained on chronic diuretic therapy.     At her last visit with Dr. Rolando Shelton in February, she was doing well and today she reports she feels fantastic. Her main item on her agenda today is making sure that she gets a new prescription for her tadalafil and that she is enrolled in the proper assistance program.    She also has concerns about chronic cough, for which she was seeing Marcela Bryan and was followed by GI for esophageal stricture. She confirms functional class 3 symptoms of dyspnea today. She hasn't gone to pulmonary rehab. She has orientation on 3/17/23. Her appointment with rheumatology isn't until June. Tadalafil 40mg after breakfast - had trouble getting paperwork signed for assistance. Furosemide 20mg daily  Spironolactone 12.5mg daily    Dry weight: 155 lbs. She has lost 20lbs in the past year. If she gains 2lbs in 2 days then she increases spironolactone to 25mg. 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 General Appearance:   The patient is pleasant and in no respiratory distress. HEENT: PERRLA. Conjunctivae unremarkable. Nasal mucosa is without epistaxis, exudate, or polyps. Gums and dentition are unremarkable. There is no oropharyngeal narrowing. TMs are clear. Neck/Lymphatic:  Symmetrical with no elevation of jugular venous pulsation. Trachea midline. No thyroid enlargement. No cervical adenopathy. Lungs:  Normal respiratory effort with symmetrical lung expansion. Breath sounds with basilar rales. Heart:  RRR No pronounced P2 or RV heave  Abdomen:  Soft and non-tender. No hepatosplenomegaly. Bowel sounds are normal.    Extremity:  No edema, clubbing or cyanosis  Musculoskeletal:  No weakness, normal muscle tone and bulk. Neuro: The patient is alert and oriented to person, place, and time. Memory appears intact and mood is normal.  No gross sensorimotor deficits are present. DIAGNOSTIC TESTS:      6MWT 6/9/21 10/20/22   distance 182 meters (51% predicted) 180m   Start;end O2 sat 94%, 88% 100%; 87% 3l   Max VETO dyspnea 6 5   COMMENTS No O2 needed Required 3lpm        Right Heart Cath 6/15/15 4/16/15   RA   6   RV   50   PA 70/30 (43) 54/7 (<25)   PCWP 5-6mmHg 5   CO 3.2 3.6   PVR  11.5  5.5WU   comments          TTE 5/5/22 7/17/19 5/8/17 12/15/16 6/1/16 11/10/15 6/12/15 4/8/15    EF 50-55% 50-54% Low normal EF 50% 50-55% 45-50% 50-55% 50%    RV Normal size, thickness, and function RVE. Mpr,a; fimctopm   RV mildly dilated, RVH RV mildly dilated Normal RV size RV markedly dilated, function reduced Moderately RV enlargement    RVSP Unable to assess 51mmHg 45mmHg Unable to measure, TAPSE 22 40mmHg 13mmHg 110mmHg 42mmHg    COMMENTS Mild AR, mild MR, trace TR   Mild T Mild LVH, global hypokinesis  JASMIN enlargement, mild to moderate TR, mild PI LA/RA normal size, trace TR, trace PI Diastolic flattening, LAE, RA massively dilated, IVC dilated, trace MR, severe TR, moderate PI.   Moderate global hypokinesis, frequent PVCs       Chest CT:  6/13/15:   Chronic inflammatory changes with some reactive lymph nodes, non-calcified granulomas      3/30/17: there is minimal scarring in both lung apices, peripheral subpleural linear opacities in the right upper lobe which is stable when compared with the prior exam. There are additional peripheral nodular and linear parenchymal densities in the right lung which are stable when compared prior exam. I believe there is some increased linear parenchymal opacity in the medial segment right middle lobe which is more prominent than seen on the prior exam. There are findings which could be secondary to peripheral pulmonary fibrosis in the left lung base. V/Q Scan:6/14/15  There are 2 matched perfusion ventilation defects in the left lung, one at the   posterior apex and the other at the base laterally. These findings are not   unexpected given the presence of the patient's enlarged heart plus chronic   postinflammatory scarring. This yields a low probability for pulmonary embolism     PFT: mild restriction, predominantly low diffusing capacity    7/12/16 10/17/2022   FVC 2.14L (57%) 2.0 (89%)   FEV1 1.71L (60%) 1.45 (89%)   FEV1/FVC 80% 0.72   TLC   3.51 (76%)   FRC      RV      DLCO 9.3mL(41%) 9.3 (48%)      CT Result (most recent):  CT ABDOMEN PELVIS W IV CONTRAST 08/06/2021    Narrative  CT OF THE ABDOMEN AND PELVIS WITH CONTRAST. CLINICAL INDICATION: Painful urination, lower quadrant abdominal pain    PROCEDURE: Serial thin section axial images obtained from the lung bases through  the proximal femurs following the administration of 100 cc of Isovue 370  intravenous contrast.  Radiation dose reduction techniques were used for this  study.  Our CT scanners use one or all of the following: Automated exposure  control, adjusted of the mA and/or kV according to patient size, iterative  reconstruction    COMPARISON: CT abdomen and pelvis dated 7/3/2018    FINDINGS: Pulmonary fibrotic changes are noted the lung bases. CT ABDOMEN: The liver and spleen are normal. The pancreas is normal. The  gallbladder is contracted. The adrenal glands are normal. There is an atrophic  left kidney. The right kidney is normal in size. There is no hydronephrosis. No  adenopathy or ascites evident. The descending colon is entirely decompressed all  the way to the splenic flexure. No colonic pathology appreciated. The small  bowel is unremarkable. CT PELVIS: The bladder is well-distended with smooth thin walls. The sigmoid  colon is entirely decompressed. The rectum is unremarkable. No inguinal hernia  or adenopathy. No aggressive osseous is identified. Impression  1. No acute abdominal or pelvic abnormality evident. No findings noted to  explain acute pain. 2. Pulmonary fibrosis at the lung bases. 3. Atrophic left kidney.       Lab Diagnostics:  HIV: No results found for: [id number]   HCV:   Lab Results   Component Value Date    HEPCAB NONREACTIVE 10/17/2022      CRUZ:    Lab Results   Component Value Date    CRUZ Positive (A) 10/17/2022      dsDNA Ab 0 - 9 IU/mL 1    Comment: (NOTE)                                     Negative      <5                                     Equivocal  5 - 9                                     Positive      >9    Anti-RNP 0.0 - 0.9 AI 2.6 High     IRENE Mckeon (SM) Ab 0.0 - 0.9 AI <0.2    IRENE SCL-70 Ab 0.0 - 0.9 AI <0.2    IRENE SSA (RO) Ab 0.0 - 0.9 AI 7.5 High     IRENE SSB (LA) Ab 0.0 - 0.9 AI <0.2    Chromatin Antibody 0.0 - 0.9 AI <0.2    IRENE MINH-1 Ab 0.0 - 0.9 AI <0.2    CENTROMERE PROTEIN B ANTIBODY 0.0 - 0.9 AI <0.2      NT-pro BNP : No results found for: BNPNT            LFTs:  Lab Results   Component Value Date    ALT 19 11/16/2022    AST 17 11/16/2022    ALKPHOS 54 11/16/2022    BILITOT 0.5 11/16/2022          ------------------------------------------------------------------------------------------------------  REFERENCE TABLES        PH GROUP: 1.4.1     CURRENT WHO FUNCTIONAL CLASS:       I without resulting limitations of physical activity. Ordinary physical activity does not cause undue fatigue or dyspnea, chest pain, or heart syncope. Generally no therapy   II slight limitation of physical activity. They are comfortable at rest. Ordinary physical activity results in undue fatigue or dyspnea, chest pain, or heart syncope. initially administer oral agents that target the endothelin and nitric oxide-cyclic guanosine monophosphate (cGMP) pathways in combination   III They are comfortable at rest. Less than ordinary physical activity causes undue fatigue or dyspnea, chest pain, or heart syncope. same   IV resulting in inability to carry on any physical activity without symptoms. These patients manifest signs of right heart failure. Dyspnea and/or fatigue may be present even at rest. Discomfort is increased by physical activity.  Referral to tertiary:  [facility]ron[facility] Winslow or [facility] preferred  parenteral prostanoid: epo, trepostinil> iloprost

## 2023-02-23 ENCOUNTER — OFFICE VISIT (OUTPATIENT)
Dept: PULMONOLOGY | Age: 83
End: 2023-02-23
Payer: MEDICARE

## 2023-02-23 VITALS
DIASTOLIC BLOOD PRESSURE: 80 MMHG | TEMPERATURE: 97.1 F | HEART RATE: 83 BPM | SYSTOLIC BLOOD PRESSURE: 122 MMHG | WEIGHT: 155.2 LBS | OXYGEN SATURATION: 99 % | RESPIRATION RATE: 15 BRPM | HEIGHT: 61 IN | BODY MASS INDEX: 29.3 KG/M2

## 2023-02-23 DIAGNOSIS — J84.9 ILD (INTERSTITIAL LUNG DISEASE) (HCC): ICD-10-CM

## 2023-02-23 DIAGNOSIS — R76.8 ANA POSITIVE: ICD-10-CM

## 2023-02-23 DIAGNOSIS — I27.20 PULMONARY HYPERTENSION (HCC): Primary | ICD-10-CM

## 2023-02-23 PROCEDURE — 3074F SYST BP LT 130 MM HG: CPT | Performed by: INTERNAL MEDICINE

## 2023-02-23 PROCEDURE — 1123F ACP DISCUSS/DSCN MKR DOCD: CPT | Performed by: INTERNAL MEDICINE

## 2023-02-23 PROCEDURE — 99215 OFFICE O/P EST HI 40 MIN: CPT | Performed by: INTERNAL MEDICINE

## 2023-02-23 PROCEDURE — G8484 FLU IMMUNIZE NO ADMIN: HCPCS | Performed by: INTERNAL MEDICINE

## 2023-02-23 PROCEDURE — 3079F DIAST BP 80-89 MM HG: CPT | Performed by: INTERNAL MEDICINE

## 2023-02-23 PROCEDURE — G8428 CUR MEDS NOT DOCUMENT: HCPCS | Performed by: INTERNAL MEDICINE

## 2023-02-23 PROCEDURE — G8400 PT W/DXA NO RESULTS DOC: HCPCS | Performed by: INTERNAL MEDICINE

## 2023-02-23 PROCEDURE — G8417 CALC BMI ABV UP PARAM F/U: HCPCS | Performed by: INTERNAL MEDICINE

## 2023-02-23 PROCEDURE — 1090F PRES/ABSN URINE INCON ASSESS: CPT | Performed by: INTERNAL MEDICINE

## 2023-02-23 PROCEDURE — 1036F TOBACCO NON-USER: CPT | Performed by: INTERNAL MEDICINE

## 2023-02-23 NOTE — PATIENT INSTRUCTIONS
Please consider pulmonary rehab. We will repeat a 6 minute walk test in April 2023. Need complete PFTs in October  Please consider the Tyvaso DPI because I think it would help your shortness of breath  Please call and schedule your chest CT. You can get your CT of the chest done at [facility] locations or at [facility] locations. Sometimes there is a lower copay at [facility] [place]. [facility] Rad Department Scheduling  [phone]  Locations:  [address] [place], [place] or [address] [place] [address]      ------------------------------------------------------------------------  [address]  [phone]    [place] Location  [address] [facility] [address] [facility], [address]    You will need a copy of your order to schedule with [facility] imaging. When your CT has been done, we recommend you call us if you haven't heard about your results within a week.

## 2023-02-27 ENCOUNTER — TELEPHONE (OUTPATIENT)
Dept: PULMONOLOGY | Age: 83
End: 2023-02-27

## 2023-02-27 ENCOUNTER — HOSPITAL ENCOUNTER (OUTPATIENT)
Dept: CT IMAGING | Age: 83
Discharge: HOME | End: 2023-03-02

## 2023-02-27 DIAGNOSIS — I27.20 PULMONARY HYPERTENSION (HCC): ICD-10-CM

## 2023-02-27 DIAGNOSIS — J84.9 ILD (INTERSTITIAL LUNG DISEASE) (HCC): ICD-10-CM

## 2023-02-27 NOTE — TELEPHONE ENCOUNTER
Spoke to patient and went over ct chest results and asked if wanted to start on Tyvaso and she said she did a lot of research and does not want to start on the medication.        ----- Message from Eric Johansen MD sent at 2/27/2023  3:59 PM EST -----  Please let patient know that there is evidence of scarring in the lungs which doesn't appear much worse than on previous scans dating back to 2017.

## 2023-02-28 ENCOUNTER — TELEPHONE (OUTPATIENT)
Dept: CARDIOLOGY CLINIC | Age: 83
End: 2023-02-28

## 2023-02-28 DIAGNOSIS — I50.32 CHRONIC DIASTOLIC (CONGESTIVE) HEART FAILURE (HCC): Primary | ICD-10-CM

## 2023-02-28 DIAGNOSIS — I42.0 DILATED CARDIOMYOPATHY (HCC): ICD-10-CM

## 2023-02-28 NOTE — TELEPHONE ENCOUNTER
----- Message from Ana Arrieta DO sent at 2/27/2023  4:03 PM EST -----  Please put in for BMP and BNP before seeing me and let her know.    Thanks

## 2023-03-15 DIAGNOSIS — I50.32 CHRONIC DIASTOLIC (CONGESTIVE) HEART FAILURE (HCC): ICD-10-CM

## 2023-03-15 DIAGNOSIS — I42.0 DILATED CARDIOMYOPATHY (HCC): ICD-10-CM

## 2023-03-15 LAB
ANION GAP SERPL CALC-SCNC: 2 MMOL/L (ref 2–11)
BUN SERPL-MCNC: 16 MG/DL (ref 8–23)
CALCIUM SERPL-MCNC: 9.9 MG/DL (ref 8.3–10.4)
CHLORIDE SERPL-SCNC: 102 MMOL/L (ref 101–110)
CO2 SERPL-SCNC: 28 MMOL/L (ref 21–32)
CREAT SERPL-MCNC: 1.1 MG/DL (ref 0.6–1)
GLUCOSE SERPL-MCNC: 95 MG/DL (ref 65–100)
NT PRO BNP: 1111 PG/ML
POTASSIUM SERPL-SCNC: 4.3 MMOL/L (ref 3.5–5.1)
SODIUM SERPL-SCNC: 132 MMOL/L (ref 133–143)

## 2023-03-17 ENCOUNTER — OFFICE VISIT (OUTPATIENT)
Dept: CARDIOLOGY CLINIC | Age: 83
End: 2023-03-17
Payer: MEDICARE

## 2023-03-17 VITALS
HEART RATE: 88 BPM | BODY MASS INDEX: 29.11 KG/M2 | SYSTOLIC BLOOD PRESSURE: 126 MMHG | DIASTOLIC BLOOD PRESSURE: 80 MMHG | WEIGHT: 154.2 LBS | HEIGHT: 61 IN

## 2023-03-17 DIAGNOSIS — N18.31 STAGE 3A CHRONIC KIDNEY DISEASE (HCC): ICD-10-CM

## 2023-03-17 DIAGNOSIS — I27.0 PRIMARY PULMONARY HYPERTENSION (HCC): ICD-10-CM

## 2023-03-17 DIAGNOSIS — J84.10 PULMONARY FIBROSIS (HCC): ICD-10-CM

## 2023-03-17 DIAGNOSIS — I42.0 DILATED CARDIOMYOPATHY (HCC): Primary | ICD-10-CM

## 2023-03-17 DIAGNOSIS — I50.32 DIASTOLIC CHF, CHRONIC (HCC): ICD-10-CM

## 2023-03-17 DIAGNOSIS — I49.3 PVC (PREMATURE VENTRICULAR CONTRACTION): ICD-10-CM

## 2023-03-17 DIAGNOSIS — I27.20 PULMONARY HYPERTENSION (HCC): ICD-10-CM

## 2023-03-17 DIAGNOSIS — J96.11 CHRONIC RESPIRATORY FAILURE WITH HYPOXIA (HCC): ICD-10-CM

## 2023-03-17 PROCEDURE — 1123F ACP DISCUSS/DSCN MKR DOCD: CPT | Performed by: INTERNAL MEDICINE

## 2023-03-17 PROCEDURE — 1090F PRES/ABSN URINE INCON ASSESS: CPT | Performed by: INTERNAL MEDICINE

## 2023-03-17 PROCEDURE — 3079F DIAST BP 80-89 MM HG: CPT | Performed by: INTERNAL MEDICINE

## 2023-03-17 PROCEDURE — G8400 PT W/DXA NO RESULTS DOC: HCPCS | Performed by: INTERNAL MEDICINE

## 2023-03-17 PROCEDURE — G8428 CUR MEDS NOT DOCUMENT: HCPCS | Performed by: INTERNAL MEDICINE

## 2023-03-17 PROCEDURE — 3074F SYST BP LT 130 MM HG: CPT | Performed by: INTERNAL MEDICINE

## 2023-03-17 PROCEDURE — G8417 CALC BMI ABV UP PARAM F/U: HCPCS | Performed by: INTERNAL MEDICINE

## 2023-03-17 PROCEDURE — 1036F TOBACCO NON-USER: CPT | Performed by: INTERNAL MEDICINE

## 2023-03-17 PROCEDURE — G8484 FLU IMMUNIZE NO ADMIN: HCPCS | Performed by: INTERNAL MEDICINE

## 2023-03-17 PROCEDURE — 99214 OFFICE O/P EST MOD 30 MIN: CPT | Performed by: INTERNAL MEDICINE

## 2023-03-17 NOTE — PROGRESS NOTES
[address], [address] [facility], [address] [place] [address]  PHONE: [phone]     23    NAME:  Tawny Lemus  : 1940  MRN: [id number]       SUBJECTIVE:   Tawny Lemus is a 80 y.o. female seen for a follow up visit regarding the following:     Chief Complaint   Patient presents with    Congestive Heart Failure       HPI:   Here for pHTN (Echo 6/15 RV sig enlarged, RVSP 110)   severe PVC burden in past (prior VT ablation , intolerant to amio, Ic agents)   prior ITP, pulm fibrosis      Off letaris d/t  increasing LFTs. LHC 3/9/18: mild CAD, normal EF     [facility] Echo 2019 [facility]: low normal EF, RVSP 51    2019 S admission for UTI, C diff. Echo 2020 and 2022: normal EF, mild TR     Lost 20 pds. Oxygen PRN now. Off the walker. Seeing Dr. Jailyn oMtt with [facility]. Exercising more now. Some ROSA, but better. Still on lasix 20mg and daily 12.5 to 25 aldactone. Losing weight. No edema, ROSA. Feeling well, no CP or pressure. Patient denies recent history of orthopnea, PND, excessive dizziness and/or syncope. Struggled with multiple different meds over the yrs, Dr. Jailyn Mott has tried multiple in the past.          Past Medical History, Past Surgical History, Family history, Social History, and Medications were all reviewed with the patient today and updated as necessary.      Current Outpatient Medications   Medication Sig Dispense Refill    trimethoprim (TRIMPEX) 100 MG tablet Take 1 tablet by mouth daily 90 tablet 0    furosemide (LASIX) 20 MG tablet Take 1 tablet by mouth daily 90 tablet 3    estradiol (ESTRACE) 0.5 MG tablet Take 1 tablet by mouth daily 90 tablet 4    Tadalafil, PAH, 20 MG tablet Take 2 tablets by mouth daily 60 tablet 11    MILK THISTLE PO Take by mouth      Zinc Acetate, Oral, (ZINC ACETATE PO) Take by mouth daily      acetaminophen (TYLENOL) 500 MG tablet Take 1,000 mg by mouth every 6 hours as needed      albuterol sulfate  (90 Base) MCG/ACT inhaler Inhale 1 puff into the lungs      Cholecalciferol 50 MCG (2000 UT) TABS Take 2,000 Units by mouth daily      levothyroxine (SYNTHROID) 112 MCG tablet Take 112 mcg by mouth every morning (before breakfast)      loperamide (IMODIUM) 2 MG capsule Take 2 mg by mouth as needed      magnesium oxide (MAG-OX) 400 MG tablet Take 400 mg by mouth daily      ondansetron (ZOFRAN-ODT) 4 MG disintegrating tablet Take 4 mg by mouth every 6 hours as needed      spironolactone (ALDACTONE) 25 MG tablet Take 25 mg by mouth Takes 1/2- 1 tablet daily       Current Facility-Administered Medications   Medication Dose Route Frequency Provider Last Rate Last Admin    gentamicin (GARAMYCIN) injection 80 mg  80 mg IntraMUSCular Once AINSLEY French CNP            Allergies   Allergen Reactions    Esomeprazole Magnesium Hives and Rash    Dextromethorphan Rash     Patient told me this information on a call for medication reconciliation.     Amitriptyline Hives    Amlodipine Hives    Amoxicillin Hives and Other (See Comments)    Hydrochlorothiazide Hives    Ibuprofen Hives    Iodine Other (See Comments)    Levofloxacin Hives    Lisinopril Hives    Nebivolol Hives    Nitrofurantoin Hives and Other (See Comments)    Olmesartan Hives    Sulfa Antibiotics Hives    Cephalexin Hives and Rash    Flecainide Rash    Gabapentin Nausea And Vomiting    Lisinopril-Hydrochlorothiazide Rash    Nitrofurantoin Macrocrystal Rash    Tramadol Nausea And Vomiting     Patient Active Problem List    Diagnosis Date Noted    Chronic renal disease, stage III [facility]) [[id number]] 10/20/2022     Priority: Medium    Postural dizziness with presyncope 07/16/2019    Chronic respiratory failure with hypoxia ([facility] 04/08/2019    Dilated cardiomyopathy ([facility] 82/46/2324    Diastolic CHF, chronic ([facility] 11/30/2018    Mild reactive airways disease 11/28/2018    Left lower lobe pneumonia 07/23/2018    Acute conjunctivitis 07/23/2018    Elevated troponin 07/23/2018 Pneumonia 07/23/2018    Shoulder blade pain 04/19/2018    Primary osteoarthritis involving multiple joints 01/09/2018    Hypovitaminosis D 01/09/2018    Collagenous colitis 01/09/2018    Cough 06/26/2017    Insomnia 08/24/2016    Nausea and vomiting 06/13/2016     recurrent, uncertain etiology        Shortness of breath 11/10/2015    Obesity (BMI 30.0-34.9) 11/10/2015    Postmenopausal 11/10/2015    Acquired hypothyroidism 11/10/2015    Undifferentiated connective tissue disease ([facility] 08/17/2015     Last Assessment & Plan:   Her lab work is consistent with an undifferentiated connective tissue   disease. Her RNP is positive along with her CRUZ antibody which is positive   for homogeneous nucleolar pattern. She does have Raynaud's phenomenon and   reflux. Is unclear what exactly the pattern this is        Primary pulmonary hypertension ([facility] 08/17/2015             Gastroesophageal reflux disease 08/17/2015     Last Assessment & Plan:   Continue over-the-counter PPI        Postnasal drip 08/17/2015     Last Assessment & Plan: We have discussed using sinus rinses for her postnasal drip and also using   that AYR saline gel for while she is using her oxygen. Should those not   work then we will have to come up with another solution        Pulmonary hypertension ([facility] 06/15/2015     Pulmonary Hypertension: had heart cath 4/16/15. At that time her mPAP was only 22, though her systolic PAP was elevated in   the 50's and her PVR was elevated at 6. The low mPAP at that time is not   conistent with the current TTE findings of RVSP of 110. She has an autoimmune process that according to her has not been labelled   as a specific entity by her rheumatologists in the past.   [address] with vaso testing:  BASELINE MEASUREMENTS:  Pulmonary artery 70/30, mean of 40. Pulmonary artery saturation 70%. Cardiac output 3.2 L/min. Cardiac index 1.8 L/min per meter squared. Adenosine 10 mcg/kg per minute: Pulmonary artery 65/30, mean 40. Cardiac  output 3.1 L/min. Cardiac index 1.8 L/min. Pulmonary artery saturation  74%. Adenosine 30 mcg/kg per minute: Pulmonary artery 50/28, mean 38. Cardiac output 3.4 L/min. Cardiac index 2.0 L/min per meter squared. Pulmonary artery saturation 76%. Adenosine 50 mcg/kg per minute: Pulmonary artery 48/22, mean pulmonary  artery 37. Cardiac output 3.8 L/min. Cardiac index 2.15 L/min per meter squared. Pulmonary artery 77%. Pulmonary capillary wedge pressure is 5-6 mmHg. Mean transpulmonary gradient at baseline 35, without significant change  at peak adenosine. Non resposive PAHTN ( MPAP should decrease by at least 10 and to below 40   mmHg)  CaCb likely not beneficial.        Hyponatremia 06/11/2015    Dyspnea 06/11/2015    Sicca syndrome, Sjogren's ([facility] 04/09/2015    Hypoxemia 07/16/2014     Last Assessment & Plan: We will again try and get her an OCD for when she is walking around. PVC (premature ventricular contraction) 04/25/2014     Last Assessment & Plan: This has improved significantly since the last time I've seen her. I   continue to believe that this is secondary to her right ventricular   dilation. I'm hoping that when her right ventricle is more happy that her   PVCs we'll decrease the number.         Raynaud's disease 04/09/2014    Lung nodule 04/09/2014     Initial CT scan in [facility] 11/15/2005--10 mm nodule in right mid   lung; PET negative 6/13/2011  CT  10/16/2011 @ [facility] [place], [place]--Multiple right lung   nodules without adenopathy        Dyspnea on exertion 04/09/2014    Pulmonary fibrosis ([facility] 04/09/2014     Initial CT scan in [facility]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        Arthritis 04/09/2014    Hyperlipidemia 04/09/2014    Hypertension 04/09/2014      Past Surgical History:   Procedure Laterality Date    APPENDECTOMY      COLONOSCOPY W/BIOPSY SINGLE/MULTIPLE  12/13/2013         HERNIA REPAIR      bilat    LAPAROSCOPIC HYSTERECTOMY      ORTHOPEDIC SURGERY      spinal/transplant 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    TONSILLECTOMY AND ADENOIDECTOMY       Family History   Problem Relation Age of Onset    Breast Cancer Mother 47    Cancer Mother         ovarian    Heart Disease Father         MI age 73/ valve replaced    Hypertension Father     Colon Cancer Neg Hx      Social History     Tobacco Use    Smoking status: Never    Smokeless tobacco: Never   Substance Use Topics    Alcohol use: Not Currently         ROS:    No obvious pertinent positives on review of systems except for what was outlined in the HPI today.       PHYSICAL EXAM:     /80   Pulse 88   Ht 5' 1\" (1.549 m)   Wt 154 lb 3.2 oz (69.9 kg)   BMI 29.14 kg/m²    General/Constitutional:   Alert and oriented x 3, no acute distress  HEENT:   normocephalic, atraumatic, moist mucous membranes  Neck:   No JVD or carotid bruits bilaterally  Cardiovascular:   regular rate and rhythm, no murmur/rub/gallop appreciated  Pulmonary:   clear to auscultation bilaterally, no respiratory distress  Abdomen:   soft, non-tender, non-distended  Ext:   No sig LE edema bilaterally  Skin:  warm and dry, no obvious rashes seen  Neuro:   no obvious sensory or motor deficits  Psychiatric:   normal mood and affect      Lab Results   Component Value Date/Time     03/15/2023 08:33 AM    K 4.3 03/15/2023 08:33 AM     03/15/2023 08:33 AM    CO2 28 03/15/2023 08:33 AM    BUN 16 03/15/2023 08:33 AM    CREATININE 1.10 03/15/2023 08:33 AM    GLUCOSE 95 03/15/2023 08:33 AM    CALCIUM 9.9 03/15/2023 08:33 AM        Lab Results   Component Value Date    WBC 7.8 11/16/2022    HGB 14.1 11/16/2022    HCT 43.7 11/16/2022    MCV 95.8 11/16/2022     11/16/2022       Lab Results   Component Value Date    TSH 0.426 05/04/2022       No results found for: LABA1C  No results found for: EAG    Lab Results   Component Value Date    CHOL 196 11/16/2022    CHOL 173 06/09/2021    CHOL 196 02/16/2021     Lab Results   Component Value Date TRIG 105 11/16/2022    TRIG 69 06/09/2021    TRIG 95 02/16/2021     Lab Results   Component Value Date    HDL 56 11/16/2022    HDL 63 06/09/2021    HDL 63 02/16/2021     Lab Results   Component Value Date    LDLCALC 119 (H) 11/16/2022    LDLCALC 97 06/09/2021    LDLCALC 116 (H) 02/16/2021     Lab Results   Component Value Date    LABVLDL 21 11/16/2022    LABVLDL 24 06/01/2020    LABVLDL 21 02/18/2020    VLDL 13 06/09/2021    VLDL 17 02/16/2021    VLDL 20 09/01/2020     Lab Results   Component Value Date    CHOLHDLRATIO 3.5 11/16/2022           I have Independently reviewed prior care notes, any ER records available, cardiac testing, labs and results with the patient and before seeing the patient today. Also independently reviewed outside records when available. ASSESSMENT:    Sin Pimentel was seen today for congestive heart failure. Diagnoses and all orders for this visit:    Dilated cardiomyopathy ([facility]    Diastolic CHF, chronic (HCC)    Pulmonary hypertension (HCC)    PVC (premature ventricular contraction)    Chronic respiratory failure with hypoxia (HCC)    Pulmonary fibrosis (HCC)    Stage 3a chronic kidney disease ([facility]    Primary pulmonary hypertension ([facility]        PLAN:      1. HTN: remain on meds, follow. She has white coat HTN. Labs are good, Na better now. 2. CM/pHTN:   Remain on lasix and PRN aldactone, 12.5 to 25mg. Normal RV on echo. No TR or pHTN on echo. Doing well now. Reviewed diuretic plan. Cr and K ok, BNP noted, but feeling well. No changes. Heart failure history and medications were reviewed with the patient today. Action plan for diuretics was reviewed (if needed) with the patient, including importance of daily weights and low sodium diet. Importance of BP control as well. The patient has been instructed to call our office with worsening shortness of breath, edema or other heart failure related signs/symptoms.   All questions were answered with the patient voicing understanding. 3. CAD:   EF normal.  No more cardiac testing needed now. 4. pHTN: Continue meds. Better now. doing well on tadalafil. No TR or pHTN on recent echo. 5. ITP:  Released by Hematology. 6. PVC:  could not take BB or CCB, follow for now. Patient has been instructed and agrees to call our office with any issues or other concerns related to their cardiac condition(s) and/or complaint(s). Return in about 6 months (around 9/17/2023).        Esperanza Yu,   3/17/2023

## 2023-03-21 ENCOUNTER — OFFICE VISIT (OUTPATIENT)
Dept: UROLOGY | Age: 83
End: 2023-03-21
Payer: MEDICARE

## 2023-03-21 DIAGNOSIS — N30.00 ACUTE CYSTITIS WITHOUT HEMATURIA: ICD-10-CM

## 2023-03-21 DIAGNOSIS — N39.0 RECURRENT UTI: Primary | ICD-10-CM

## 2023-03-21 LAB
BILIRUBIN, URINE, POC: NEGATIVE
BLOOD URINE, POC: NEGATIVE
GLUCOSE URINE, POC: NEGATIVE
KETONES, URINE, POC: NEGATIVE
LEUKOCYTE ESTERASE, URINE, POC: NORMAL
NITRITE, URINE, POC: NEGATIVE
PH, URINE, POC: 7 (ref 4.6–8)
PROTEIN,URINE, POC: NEGATIVE
SPECIFIC GRAVITY, URINE, POC: 1.01 (ref 1–1.03)
URINALYSIS CLARITY, POC: NORMAL
URINALYSIS COLOR, POC: NORMAL
UROBILINOGEN, POC: NORMAL

## 2023-03-21 PROCEDURE — G8427 DOCREV CUR MEDS BY ELIG CLIN: HCPCS | Performed by: NURSE PRACTITIONER

## 2023-03-21 PROCEDURE — G8484 FLU IMMUNIZE NO ADMIN: HCPCS | Performed by: NURSE PRACTITIONER

## 2023-03-21 PROCEDURE — 81003 URINALYSIS AUTO W/O SCOPE: CPT | Performed by: NURSE PRACTITIONER

## 2023-03-21 PROCEDURE — 1036F TOBACCO NON-USER: CPT | Performed by: NURSE PRACTITIONER

## 2023-03-21 PROCEDURE — G8400 PT W/DXA NO RESULTS DOC: HCPCS | Performed by: NURSE PRACTITIONER

## 2023-03-21 PROCEDURE — 99214 OFFICE O/P EST MOD 30 MIN: CPT | Performed by: NURSE PRACTITIONER

## 2023-03-21 PROCEDURE — G8417 CALC BMI ABV UP PARAM F/U: HCPCS | Performed by: NURSE PRACTITIONER

## 2023-03-21 PROCEDURE — 1123F ACP DISCUSS/DSCN MKR DOCD: CPT | Performed by: NURSE PRACTITIONER

## 2023-03-21 PROCEDURE — 1090F PRES/ABSN URINE INCON ASSESS: CPT | Performed by: NURSE PRACTITIONER

## 2023-03-21 ASSESSMENT — ENCOUNTER SYMPTOMS
NAUSEA: 0
BACK PAIN: 0

## 2023-03-21 NOTE — PROGRESS NOTES
[facility]  [address] [facility] [address], [address] [facility]  [phone]          Lydia Glasgow  : 1940    Chief Complaint   Patient presents with    Follow-up          HPI     Lydia Glasgow is a 80 y.o. female followed for recurrent UTI's and most recently, bladder lesion. Had gross hematuria in 2018. Had neg work up on 2018 other than L renal atrophy and atrophic vaginitis. Started on Estrace cream twice wkly. she is now using this PRN. Stable mild cystocele by report. Recently had recurrence of pelvic pressure and dysuria. Ucx on 20 grew Klebsiella but no recent positive culture results. Reported improvement with abx and urogesic blue. Previous bladder biopsies in  showed cystitis. CT on 21 shows L renal atrophy and pulm fibrosis with normal upper tracts. [facility]. Cyst in office 21 showed PVR small. Pelvic exam was unremarkable. Scattered areas of cystitis noted. Bladder lesion noted. She went to the OR 21 for cystoscopy, bladder biopsies, fulguration of abnormal bladder urothelium, bilateral retrograde pyelograms. RGP WNL. Bladder bx benign. She has had recent recurrence of UTI. Positive urine culture 22 and 11/3/22. She had another UTI confirmed w culture 22. She was seen 23 for UTI. Culture was lost. Took trimethroprim 100 mg PO BID x 7 days. We then start trimethoprim 100 mg PO daily. Cont to have UTI sx. Repeat culture 23 was negative for bacterial growth. She is back today for follow up. Reports she is doing much better.        Past Medical History:   Diagnosis Date    Acute on chronic combined systolic and diastolic heart failure ([address] 2015    Acute renal failure (ARF) ([address] 2015    Adiposity 11/10/2015    Arthritis 2014    Bilateral carotid artery stenosis     CAD (coronary artery disease) 2014    Moderate CAD 4/15     Chronic kidney disease     pt states she has one kidney    Coagulation

## 2023-03-29 ENCOUNTER — HOSPITAL ENCOUNTER (OUTPATIENT)
Dept: CARDIAC REHAB | Age: 83
Setting detail: RECURRING SERIES
Discharge: HOME | End: 2023-04-01

## 2023-03-29 ASSESSMENT — PATIENT HEALTH QUESTIONNAIRE - PHQ9
10. IF YOU CHECKED OFF ANY PROBLEMS, HOW DIFFICULT HAVE THESE PROBLEMS MADE IT FOR YOU TO DO YOUR WORK, TAKE CARE OF THINGS AT HOME, OR GET ALONG WITH OTHER PEOPLE: 0
6. FEELING BAD ABOUT YOURSELF - OR THAT YOU ARE A FAILURE OR HAVE LET YOURSELF OR YOUR FAMILY DOWN: 0
3. TROUBLE FALLING OR STAYING ASLEEP: 0
2. FEELING DOWN, DEPRESSED OR HOPELESS: 0
SUM OF ALL RESPONSES TO PHQ QUESTIONS 1-9: 0
SUM OF ALL RESPONSES TO PHQ QUESTIONS 1-9: 0
9. THOUGHTS THAT YOU WOULD BE BETTER OFF DEAD, OR OF HURTING YOURSELF: 0
4. FEELING TIRED OR HAVING LITTLE ENERGY: 0
5. POOR APPETITE OR OVEREATING: 0
SUM OF ALL RESPONSES TO PHQ9 QUESTIONS 1 & 2: 0
7. TROUBLE CONCENTRATING ON THINGS, SUCH AS READING THE NEWSPAPER OR WATCHING TELEVISION: 0
SUM OF ALL RESPONSES TO PHQ QUESTIONS 1-9: 0
8. MOVING OR SPEAKING SO SLOWLY THAT OTHER PEOPLE COULD HAVE NOTICED. OR THE OPPOSITE, BEING SO FIGETY OR RESTLESS THAT YOU HAVE BEEN MOVING AROUND A LOT MORE THAN USUAL: 0
SUM OF ALL RESPONSES TO PHQ QUESTIONS 1-9: 0
1. LITTLE INTEREST OR PLEASURE IN DOING THINGS: 0

## 2023-03-29 ASSESSMENT — PULMONARY FUNCTION TESTS
FVC (LITERS): 2
FEV1 (%PREDICTED): 89
FEV1/FVC: 72

## 2023-03-29 ASSESSMENT — LIFESTYLE VARIABLES: SMOKELESS_TOBACCO: NO

## 2023-03-30 NOTE — CARDIO/PULMONARY
3/30/2023      Dear Dr. Es Becker    Thank you for referring your patient, Tj Thornton (SEP:[id number] ), to the Pulmonary Rehabilitation Program at [facility]. Ms. Lachelle Castillo is a good candidate for the program and should see improvements with regular participation. We will be working to increase your patient's endurance and self care over the next 12 weeks. We will contact you with any issues or concerns that may arise, or you can follow your patient's progress through [address] [place] [address] at any time. We will send you a final summary report when the program is completed. Again, thank you for your referral. If we can be of further assistance, please feel free to contact the Cardiopulmonary Rehab staff at [phone].     Sincerely,      Anthony Eid, TIERRA,   Pulmonary Rehab Specialist   [facility]    CC: File

## 2023-04-04 ENCOUNTER — HOSPITAL ENCOUNTER (OUTPATIENT)
Dept: CARDIAC REHAB | Age: 83
Setting detail: RECURRING SERIES
Discharge: HOME | End: 2023-04-07
Payer: MEDICARE

## 2023-04-04 VITALS — OXYGEN SATURATION: 90 % | HEIGHT: 61 IN | BODY MASS INDEX: 29.49 KG/M2 | WEIGHT: 156.2 LBS

## 2023-04-04 PROCEDURE — 94625 PHY/QHP OP PULM RHB W/O MNTR: CPT

## 2023-04-04 PROCEDURE — G0239 OTH RESP PROC, GROUP: HCPCS

## 2023-04-04 ASSESSMENT — EXERCISE STRESS TEST
PEAK_HR: 98
PEAK_BP: 158/98
PEAK_BP: 158/98
PEAK_HR: 98
PEAK_RPE: 12
PEAK_METS: 1.6
PEAK_RPE: 12
PEAK_BP: 158/98
PEAK_METS: 1.6

## 2023-04-04 ASSESSMENT — PULMONARY FUNCTION TESTS
FVC (LITERS): 2
FEV1 (%PREDICTED): 89
FEV1/FVC: 72

## 2023-04-04 ASSESSMENT — LIFESTYLE VARIABLES: SMOKELESS_TOBACCO: NO

## 2023-04-13 ENCOUNTER — HOSPITAL ENCOUNTER (OUTPATIENT)
Dept: CARDIAC REHAB | Age: 83
Setting detail: RECURRING SERIES
Discharge: HOME | End: 2023-04-16
Payer: MEDICARE

## 2023-04-13 PROCEDURE — G0239 OTH RESP PROC, GROUP: HCPCS

## 2023-04-13 ASSESSMENT — EXERCISE STRESS TEST
PEAK_HR: 93
PEAK_BP: 140/80
PEAK_METS: 2.1

## 2023-04-18 ENCOUNTER — HOSPITAL ENCOUNTER (OUTPATIENT)
Dept: CARDIAC REHAB | Age: 83
Setting detail: RECURRING SERIES
Discharge: HOME | End: 2023-04-21
Payer: MEDICARE

## 2023-04-18 VITALS — WEIGHT: 155.6 LBS | BODY MASS INDEX: 29.4 KG/M2

## 2023-04-18 PROCEDURE — G0239 OTH RESP PROC, GROUP: HCPCS

## 2023-04-18 ASSESSMENT — EXERCISE STRESS TEST
PEAK_HR: 92
PEAK_METS: 2.2
PEAK_BP: 120/76

## 2023-04-20 ENCOUNTER — HOSPITAL ENCOUNTER (OUTPATIENT)
Dept: CARDIAC REHAB | Age: 83
Setting detail: RECURRING SERIES
Discharge: HOME | End: 2023-04-23
Payer: COMMERCIAL

## 2023-04-20 PROCEDURE — G0239 OTH RESP PROC, GROUP: HCPCS

## 2023-04-20 ASSESSMENT — EXERCISE STRESS TEST
PEAK_HR: 90
PEAK_METS: 2.3
PEAK_BP: 120/74

## 2023-04-25 ENCOUNTER — HOSPITAL ENCOUNTER (OUTPATIENT)
Dept: CARDIAC REHAB | Age: 83
Setting detail: RECURRING SERIES
Discharge: HOME | End: 2023-04-28
Payer: COMMERCIAL

## 2023-04-25 VITALS — WEIGHT: 155.4 LBS | BODY MASS INDEX: 29.36 KG/M2

## 2023-04-25 PROCEDURE — G0239 OTH RESP PROC, GROUP: HCPCS

## 2023-04-25 ASSESSMENT — EXERCISE STRESS TEST
PEAK_BP: 140/86
PEAK_HR: 100
PEAK_METS: 2.3

## 2023-04-27 ENCOUNTER — HOSPITAL ENCOUNTER (OUTPATIENT)
Dept: CARDIAC REHAB | Age: 83
Setting detail: RECURRING SERIES
Discharge: HOME | End: 2023-04-30
Payer: COMMERCIAL

## 2023-04-27 PROCEDURE — G0239 OTH RESP PROC, GROUP: HCPCS

## 2023-04-27 ASSESSMENT — EXERCISE STRESS TEST
PEAK_METS: 2.3
PEAK_BP: 126/70
PEAK_HR: 93

## 2023-05-01 ASSESSMENT — PULMONARY FUNCTION TESTS
FVC (LITERS): 2
FEV1/FVC: 72
FEV1 (%PREDICTED): 89

## 2023-05-01 ASSESSMENT — LIFESTYLE VARIABLES: SMOKELESS_TOBACCO: NO

## 2023-05-01 ASSESSMENT — EXERCISE STRESS TEST: PEAK_BP: 126/70

## 2023-05-02 ENCOUNTER — HOSPITAL ENCOUNTER (OUTPATIENT)
Dept: CARDIAC REHAB | Age: 83
Setting detail: RECURRING SERIES
End: 2023-05-02
Payer: MEDICARE

## 2023-05-02 ENCOUNTER — OFFICE VISIT (OUTPATIENT)
Dept: UROLOGY | Age: 83
End: 2023-05-02
Payer: COMMERCIAL

## 2023-05-02 DIAGNOSIS — R82.81 PYURIA: ICD-10-CM

## 2023-05-02 DIAGNOSIS — N39.0 RECURRENT UTI: Primary | ICD-10-CM

## 2023-05-02 PROCEDURE — 1123F ACP DISCUSS/DSCN MKR DOCD: CPT | Performed by: NURSE PRACTITIONER

## 2023-05-02 PROCEDURE — 99214 OFFICE O/P EST MOD 30 MIN: CPT | Performed by: NURSE PRACTITIONER

## 2023-05-02 RX ORDER — TRIMETHOPRIM 100 MG/1
100 TABLET ORAL 2 TIMES DAILY
Qty: 14 TABLET | Refills: 2 | Status: SHIPPED | OUTPATIENT
Start: 2023-05-02 | End: 2023-05-09

## 2023-05-02 ASSESSMENT — ENCOUNTER SYMPTOMS
NAUSEA: 0
BACK PAIN: 0

## 2023-05-02 NOTE — PROGRESS NOTES
Socioeconomic History    Marital status:      Spouse name: Not on file    Number of children: Not on file    Years of education: Not on file    Highest education level: Not on file   Occupational History    Not on file   Tobacco Use    Smoking status: Never    Smokeless tobacco: Never   Substance and Sexual Activity    Alcohol use: Not Currently    Drug use: No    Sexual activity: Not on file   Other Topics Concern    Not on file   Social History Narrative     and lives alone. Daughter lives in [place]. No pets. Retired, previously worked as an . Social Determinants of Health     Financial Resource Strain: Not on file   Food Insecurity: Not on file   Transportation Needs: Not on file   Physical Activity: Not on file   Stress: Not on file   Social Connections: Not on file   Intimate Partner Violence: Not on file   Housing Stability: Not on file     Family History   Problem Relation Age of Onset    Breast Cancer Mother 47    Cancer Mother         ovarian    Heart Disease Father         MI age 73/ valve replaced    Hypertension Father     Colon Cancer Neg Hx        Review of Systems  Constitutional:   Negative for fever. GI:  Negative for nausea. Musculoskeletal:  Negative for back pain. Urine micro-  RBC- 0  WBC- 5-10  Epith- 0  Bacteria- 0      PHYSICAL EXAM    General appearance - well appearing and in no distress  Mental status - alert, oriented to person, place, and time  Neck - supple, no significant adenopathy  Chest/Lung-  Quiet, even and easy respiratory effort without use of accessory muscles  Skin - normal coloration and turgor, no rashes      Assessment and Plan    ICD-10-CM    1. Recurrent UTI  N39.0       2. Pyuria  R82.81           Recurrent UTI/Pyuria- urine w pyuria. No bacteria or hematuria. Wean down on trimethoprim 100 mg PO to 3x/week x 4 weeks then STOP. Will keep Trimethoprim 100 mg PO BID x 7 days PRN UTI sx. RTO in 6-8 weeks for follow up with me.

## 2023-05-04 ENCOUNTER — HOSPITAL ENCOUNTER (OUTPATIENT)
Dept: CARDIAC REHAB | Age: 83
Setting detail: RECURRING SERIES
Discharge: HOME | End: 2023-05-07
Payer: COMMERCIAL

## 2023-05-04 VITALS — WEIGHT: 155.2 LBS | BODY MASS INDEX: 29.32 KG/M2

## 2023-05-04 PROCEDURE — G0239 OTH RESP PROC, GROUP: HCPCS

## 2023-05-04 ASSESSMENT — EXERCISE STRESS TEST
PEAK_BP: 124/78
PEAK_METS: 2.4
PEAK_HR: 86

## 2023-05-09 ENCOUNTER — HOSPITAL ENCOUNTER (OUTPATIENT)
Dept: CARDIAC REHAB | Age: 83
Setting detail: RECURRING SERIES
Discharge: HOME | End: 2023-05-12
Payer: COMMERCIAL

## 2023-05-09 VITALS — BODY MASS INDEX: 29.27 KG/M2 | WEIGHT: 154.9 LBS

## 2023-05-09 PROCEDURE — G0239 OTH RESP PROC, GROUP: HCPCS

## 2023-05-09 ASSESSMENT — EXERCISE STRESS TEST
PEAK_METS: 2.6
PEAK_BP: 128/80
PEAK_HR: 91

## 2023-05-11 ENCOUNTER — HOSPITAL ENCOUNTER (OUTPATIENT)
Dept: CARDIAC REHAB | Age: 83
Setting detail: RECURRING SERIES
Discharge: HOME | End: 2023-05-14
Payer: MEDICARE

## 2023-05-11 PROCEDURE — G0239 OTH RESP PROC, GROUP: HCPCS

## 2023-05-11 ASSESSMENT — EXERCISE STRESS TEST
PEAK_HR: 90
PEAK_METS: 2.6
PEAK_BP: 118/76

## 2023-05-16 ENCOUNTER — APPOINTMENT (OUTPATIENT)
Dept: CARDIAC REHAB | Age: 83
End: 2023-05-16
Payer: MEDICARE

## 2023-05-18 ENCOUNTER — APPOINTMENT (OUTPATIENT)
Dept: CARDIAC REHAB | Age: 83
End: 2023-05-18
Payer: MEDICARE

## 2023-05-30 ENCOUNTER — HOSPITAL ENCOUNTER (OUTPATIENT)
Dept: CARDIAC REHAB | Age: 83
Setting detail: RECURRING SERIES
End: 2023-05-30
Payer: MEDICARE

## 2023-06-01 ENCOUNTER — HOSPITAL ENCOUNTER (OUTPATIENT)
Dept: CARDIAC REHAB | Age: 83
Setting detail: RECURRING SERIES
Discharge: HOME | End: 2023-06-04
Payer: MEDICARE

## 2023-06-01 VITALS — WEIGHT: 158 LBS | BODY MASS INDEX: 29.85 KG/M2

## 2023-06-01 PROCEDURE — G0239 OTH RESP PROC, GROUP: HCPCS

## 2023-06-01 ASSESSMENT — EXERCISE STRESS TEST
PEAK_METS: 2.6
PEAK_BP: 126/70
PEAK_HR: 90
PEAK_BP: 124/74

## 2023-06-01 ASSESSMENT — LIFESTYLE VARIABLES: SMOKELESS_TOBACCO: NO

## 2023-06-01 ASSESSMENT — PULMONARY FUNCTION TESTS
FEV1 (%PREDICTED): 89
FVC (LITERS): 2
FEV1/FVC: 72

## 2023-06-06 ENCOUNTER — HOSPITAL ENCOUNTER (OUTPATIENT)
Dept: CARDIAC REHAB | Age: 83
Setting detail: RECURRING SERIES
Discharge: HOME | End: 2023-06-09
Payer: MEDICARE

## 2023-06-06 VITALS — WEIGHT: 156.8 LBS | BODY MASS INDEX: 29.63 KG/M2

## 2023-06-06 PROCEDURE — G0239 OTH RESP PROC, GROUP: HCPCS

## 2023-06-06 ASSESSMENT — EXERCISE STRESS TEST
PEAK_METS: 2.6
PEAK_BP: 130/82
PEAK_HR: 98

## 2023-06-12 ENCOUNTER — TELEPHONE (OUTPATIENT)
Dept: UROLOGY | Age: 83
End: 2023-06-12

## 2023-06-12 NOTE — TELEPHONE ENCOUNTER
Pt LVM requesting to reschedule 07/05/23 appt stating that she will be ut of town until 07/06/23. Appt rescheduled for 07/10/23.

## 2023-06-15 ENCOUNTER — HOSPITAL ENCOUNTER (OUTPATIENT)
Dept: CARDIAC REHAB | Age: 83
Setting detail: RECURRING SERIES
Discharge: HOME | End: 2023-06-18
Payer: MEDICARE

## 2023-06-15 PROCEDURE — G0239 OTH RESP PROC, GROUP: HCPCS

## 2023-06-15 ASSESSMENT — EXERCISE STRESS TEST
PEAK_METS: 2.6
PEAK_BP: 118/70
PEAK_HR: 95

## 2023-06-20 ENCOUNTER — TELEPHONE (OUTPATIENT)
Dept: UROLOGY | Age: 83
End: 2023-06-20

## 2023-06-20 ENCOUNTER — HOSPITAL ENCOUNTER (OUTPATIENT)
Dept: CARDIAC REHAB | Age: 83
Setting detail: RECURRING SERIES
Discharge: HOME | End: 2023-06-23
Payer: MEDICARE

## 2023-06-20 VITALS — BODY MASS INDEX: 29.06 KG/M2 | WEIGHT: 153.8 LBS

## 2023-06-20 PROCEDURE — G0239 OTH RESP PROC, GROUP: HCPCS

## 2023-06-20 RX ORDER — TRIMETHOPRIM 100 MG/1
100 TABLET ORAL DAILY
Qty: 90 TABLET | Refills: 0 | Status: SHIPPED | OUTPATIENT
Start: 2023-06-20

## 2023-06-20 ASSESSMENT — EXERCISE STRESS TEST
PEAK_METS: 2.6
PEAK_HR: 100
PEAK_BP: 132/76

## 2023-06-22 ENCOUNTER — APPOINTMENT (OUTPATIENT)
Dept: CARDIAC REHAB | Age: 83
End: 2023-06-22
Payer: MEDICARE

## 2023-06-27 ENCOUNTER — APPOINTMENT (OUTPATIENT)
Dept: CARDIAC REHAB | Age: 83
End: 2023-06-27
Payer: MEDICARE

## 2023-06-29 ENCOUNTER — APPOINTMENT (OUTPATIENT)
Dept: CARDIAC REHAB | Age: 83
End: 2023-06-29
Payer: MEDICARE

## 2023-07-06 ENCOUNTER — APPOINTMENT (OUTPATIENT)
Dept: CARDIAC REHAB | Age: 83
End: 2023-07-06
Payer: COMMERCIAL

## 2023-07-10 ENCOUNTER — OFFICE VISIT (OUTPATIENT)
Dept: UROLOGY | Age: 83
End: 2023-07-10
Payer: COMMERCIAL

## 2023-07-10 DIAGNOSIS — N39.0 RECURRENT UTI: Primary | ICD-10-CM

## 2023-07-10 DIAGNOSIS — R82.81 PYURIA: ICD-10-CM

## 2023-07-10 LAB
BILIRUBIN, URINE, POC: NEGATIVE
BLOOD URINE, POC: NORMAL
GLUCOSE URINE, POC: NEGATIVE
KETONES, URINE, POC: NEGATIVE
LEUKOCYTE ESTERASE, URINE, POC: NORMAL
NITRITE, URINE, POC: NEGATIVE
PH, URINE, POC: 7 (ref 4.6–8)
PROTEIN,URINE, POC: NEGATIVE
SPECIFIC GRAVITY, URINE, POC: 1.01 (ref 1–1.03)
URINALYSIS CLARITY, POC: NORMAL
URINALYSIS COLOR, POC: NORMAL
UROBILINOGEN, POC: NORMAL

## 2023-07-10 PROCEDURE — 81003 URINALYSIS AUTO W/O SCOPE: CPT | Performed by: NURSE PRACTITIONER

## 2023-07-10 PROCEDURE — 99214 OFFICE O/P EST MOD 30 MIN: CPT | Performed by: NURSE PRACTITIONER

## 2023-07-10 PROCEDURE — G8417 CALC BMI ABV UP PARAM F/U: HCPCS | Performed by: NURSE PRACTITIONER

## 2023-07-10 PROCEDURE — 1036F TOBACCO NON-USER: CPT | Performed by: NURSE PRACTITIONER

## 2023-07-10 PROCEDURE — 1123F ACP DISCUSS/DSCN MKR DOCD: CPT | Performed by: NURSE PRACTITIONER

## 2023-07-10 PROCEDURE — 1090F PRES/ABSN URINE INCON ASSESS: CPT | Performed by: NURSE PRACTITIONER

## 2023-07-10 PROCEDURE — G8427 DOCREV CUR MEDS BY ELIG CLIN: HCPCS | Performed by: NURSE PRACTITIONER

## 2023-07-10 PROCEDURE — G8400 PT W/DXA NO RESULTS DOC: HCPCS | Performed by: NURSE PRACTITIONER

## 2023-07-10 ASSESSMENT — PULMONARY FUNCTION TESTS
FVC (LITERS): 2
FEV1 (%PREDICTED): 89
FEV1/FVC: 72

## 2023-07-10 ASSESSMENT — ENCOUNTER SYMPTOMS
BACK PAIN: 0
NAUSEA: 0

## 2023-07-10 ASSESSMENT — LIFESTYLE VARIABLES: SMOKELESS_TOBACCO: NO

## 2023-07-10 ASSESSMENT — EXERCISE STRESS TEST: PEAK_BP: 120/80

## 2023-07-10 NOTE — PROGRESS NOTES
clear     Glucose, Urine, POC Negative Negative    Bilirubin, Urine, POC Negative Negative    Ketones, Urine, POC Negative Negative    Specific Gravity, Urine, POC 1.025 1.001 - 1.035    Blood, Urine, POC negative Negative    pH, Urine, POC 7.0 4.6 - 8.0    Protein, Urine, POC Negative Negative    Urobilinogen, POC 0.2     Nitrite, Urine, POC negative Negative    Leukocyte Esterase, Urine, POC Large Negative       UA - Micro  WBC - 2+  RBC - 0  Bacteria - 0  Epith - 2+    PHYSICAL EXAM    General appearance - well appearing and in no distress  Mental status - alert, oriented to person, place, and time  Neck - supple, no significant adenopathy  Chest/Lung-  Quiet, even and easy respiratory effort without use of accessory muscles  Skin - normal coloration and turgor, no rashes      Assessment and Plan    ICD-10-CM    1. Recurrent UTI  N39.0 AMB POC URINALYSIS DIP STICK AUTO W/O MICRO      2. Pyuria  R82.81 AMB POC URINALYSIS DIP STICK AUTO W/O MICRO          Recurrent UTI/Pyuria- urine normal. STOP trimethoprim 100 mg PO 3x/week. I have educated patient to behavioral changes that can decrease the frequency of any urinary infection. These include eliminating constipation, front to back wiping, frequent voiding to keep bladder volumes low, double voiding, avoid soaking in water (including baths, pools, hot tubs), use of cranberry products, D-mannose, and use of probiotics. I encouraged consuming minimum of 64 oz of water daily. I also recommend avoiding consumption of sugary beverages and other known bladder irritants. Advised to call this office if develops sx of UTI for specimen check. If unable to come to this office, encouraged to have urine culture performed and records sent to this office. Will use Trimethoprim 100 mg PO BID x 7 days PRN UTI sx. Amor Juarez is supervising physician today and he approves plan of care.

## 2023-07-11 ENCOUNTER — HOSPITAL ENCOUNTER (OUTPATIENT)
Dept: CARDIAC REHAB | Age: 83
Setting detail: RECURRING SERIES
End: 2023-07-11
Payer: COMMERCIAL

## 2023-07-11 ENCOUNTER — HOSPITAL ENCOUNTER (OUTPATIENT)
Dept: CARDIAC REHAB | Age: 83
Setting detail: RECURRING SERIES
Discharge: HOME | End: 2023-07-14
Payer: COMMERCIAL

## 2023-07-11 VITALS — WEIGHT: 156.2 LBS | BODY MASS INDEX: 29.51 KG/M2

## 2023-07-11 PROCEDURE — G0239 OTH RESP PROC, GROUP: HCPCS

## 2023-07-11 ASSESSMENT — EXERCISE STRESS TEST
PEAK_METS: 2.6
PEAK_HR: 96
PEAK_BP: 126/66

## 2023-07-13 ENCOUNTER — HOSPITAL ENCOUNTER (OUTPATIENT)
Dept: CARDIAC REHAB | Age: 83
Setting detail: RECURRING SERIES
Discharge: HOME | End: 2023-07-16
Payer: MEDICARE

## 2023-07-13 PROCEDURE — G0239 OTH RESP PROC, GROUP: HCPCS

## 2023-07-13 ASSESSMENT — PATIENT HEALTH QUESTIONNAIRE - PHQ9
8. MOVING OR SPEAKING SO SLOWLY THAT OTHER PEOPLE COULD HAVE NOTICED. OR THE OPPOSITE, BEING SO FIGETY OR RESTLESS THAT YOU HAVE BEEN MOVING AROUND A LOT MORE THAN USUAL: 0
SUM OF ALL RESPONSES TO PHQ QUESTIONS 1-9: 0
SUM OF ALL RESPONSES TO PHQ QUESTIONS 1-9: 0
9. THOUGHTS THAT YOU WOULD BE BETTER OFF DEAD, OR OF HURTING YOURSELF: 0
SUM OF ALL RESPONSES TO PHQ QUESTIONS 1-9: 0
SUM OF ALL RESPONSES TO PHQ QUESTIONS 1-9: 0
4. FEELING TIRED OR HAVING LITTLE ENERGY: 0
6. FEELING BAD ABOUT YOURSELF - OR THAT YOU ARE A FAILURE OR HAVE LET YOURSELF OR YOUR FAMILY DOWN: 0
7. TROUBLE CONCENTRATING ON THINGS, SUCH AS READING THE NEWSPAPER OR WATCHING TELEVISION: 0
5. POOR APPETITE OR OVEREATING: 0
3. TROUBLE FALLING OR STAYING ASLEEP: 0

## 2023-07-13 ASSESSMENT — EXERCISE STRESS TEST
PEAK_BP: 118/78
PEAK_HR: 98
PEAK_METS: 2.6

## 2023-07-18 ENCOUNTER — HOSPITAL ENCOUNTER (OUTPATIENT)
Dept: CARDIAC REHAB | Age: 83
Setting detail: RECURRING SERIES
Discharge: HOME | End: 2023-07-21
Payer: MEDICARE

## 2023-07-18 VITALS — WEIGHT: 154.6 LBS | BODY MASS INDEX: 29.21 KG/M2

## 2023-07-18 PROCEDURE — G0239 OTH RESP PROC, GROUP: HCPCS

## 2023-07-18 ASSESSMENT — EXERCISE STRESS TEST
PEAK_BP: 120/70
PEAK_METS: 2.6
PEAK_HR: 93

## 2023-07-20 ENCOUNTER — HOSPITAL ENCOUNTER (OUTPATIENT)
Dept: CARDIAC REHAB | Age: 83
Setting detail: RECURRING SERIES
Discharge: HOME | End: 2023-07-23
Payer: COMMERCIAL

## 2023-07-20 PROCEDURE — G0239 OTH RESP PROC, GROUP: HCPCS

## 2023-07-20 ASSESSMENT — EXERCISE STRESS TEST
PEAK_METS: 2.6
PEAK_BP: 130/68
PEAK_HR: 103

## 2023-07-25 ENCOUNTER — HOSPITAL ENCOUNTER (OUTPATIENT)
Dept: CARDIAC REHAB | Age: 83
Setting detail: RECURRING SERIES
Discharge: HOME | End: 2023-07-28
Payer: COMMERCIAL

## 2023-07-25 VITALS — BODY MASS INDEX: 29.21 KG/M2 | WEIGHT: 154.6 LBS

## 2023-07-25 PROCEDURE — G0239 OTH RESP PROC, GROUP: HCPCS

## 2023-07-25 ASSESSMENT — EXERCISE STRESS TEST
PEAK_METS: 2.6
PEAK_HR: 106
PEAK_BP: 120/64

## 2023-07-27 ENCOUNTER — APPOINTMENT (OUTPATIENT)
Dept: CARDIAC REHAB | Age: 83
End: 2023-07-27
Payer: COMMERCIAL

## 2023-07-28 ENCOUNTER — APPOINTMENT (OUTPATIENT)
Dept: GENERAL RADIOLOGY | Age: 83
DRG: 204 | End: 2023-07-28
Attending: INTERNAL MEDICINE
Payer: MEDICARE

## 2023-07-28 ENCOUNTER — OFFICE VISIT (OUTPATIENT)
Age: 83
End: 2023-07-28

## 2023-07-28 ENCOUNTER — HOSPITAL ENCOUNTER (INPATIENT)
Age: 83
LOS: 1 days | Discharge: HOME | DRG: 204 | End: 2023-07-30
Attending: INTERNAL MEDICINE | Admitting: INTERNAL MEDICINE
Payer: MEDICARE

## 2023-07-28 VITALS
DIASTOLIC BLOOD PRESSURE: 98 MMHG | SYSTOLIC BLOOD PRESSURE: 162 MMHG | HEART RATE: 80 BPM | BODY MASS INDEX: 27.11 KG/M2 | HEIGHT: 61 IN | WEIGHT: 143.6 LBS

## 2023-07-28 DIAGNOSIS — I49.3 PVC (PREMATURE VENTRICULAR CONTRACTION): ICD-10-CM

## 2023-07-28 DIAGNOSIS — I50.32 DIASTOLIC CHF, CHRONIC (HCC): ICD-10-CM

## 2023-07-28 DIAGNOSIS — N18.31 STAGE 3A CHRONIC KIDNEY DISEASE (HCC): ICD-10-CM

## 2023-07-28 DIAGNOSIS — J84.10 PULMONARY FIBROSIS (HCC): ICD-10-CM

## 2023-07-28 DIAGNOSIS — I27.20 PULMONARY HYPERTENSION (HCC): ICD-10-CM

## 2023-07-28 DIAGNOSIS — J96.11 CHRONIC RESPIRATORY FAILURE WITH HYPOXIA (HCC): ICD-10-CM

## 2023-07-28 DIAGNOSIS — I27.20 PULMONARY HYPERTENSION (HCC): Primary | ICD-10-CM

## 2023-07-28 DIAGNOSIS — R06.09 DYSPNEA ON EXERTION: ICD-10-CM

## 2023-07-28 DIAGNOSIS — I42.0 DILATED CARDIOMYOPATHY (HCC): Primary | ICD-10-CM

## 2023-07-28 DIAGNOSIS — I10 PRIMARY HYPERTENSION: ICD-10-CM

## 2023-07-28 DIAGNOSIS — I27.0 PRIMARY PULMONARY HYPERTENSION (HCC): ICD-10-CM

## 2023-07-28 PROBLEM — J96.10 CHRONIC RESPIRATORY FAILURE (HCC): Status: ACTIVE | Noted: 2019-04-08

## 2023-07-28 PROBLEM — J96.01 ACUTE RESPIRATORY FAILURE WITH HYPOXIA (HCC): Status: ACTIVE | Noted: 2023-07-28

## 2023-07-28 LAB
ALBUMIN SERPL-MCNC: 3.5 G/DL (ref 3.2–4.6)
ALBUMIN/GLOB SERPL: 0.9 (ref 0.4–1.6)
ALP SERPL-CCNC: 66 U/L (ref 50–136)
ALT SERPL-CCNC: 12 U/L (ref 12–65)
ANION GAP SERPL CALC-SCNC: 6 MMOL/L (ref 2–11)
AST SERPL-CCNC: 21 U/L (ref 15–37)
BILIRUB SERPL-MCNC: 0.6 MG/DL (ref 0.2–1.1)
BUN SERPL-MCNC: 18 MG/DL (ref 8–23)
CALCIUM SERPL-MCNC: 10 MG/DL (ref 8.3–10.4)
CHLORIDE SERPL-SCNC: 98 MMOL/L (ref 101–110)
CO2 SERPL-SCNC: 28 MMOL/L (ref 21–32)
CREAT SERPL-MCNC: 1.1 MG/DL (ref 0.6–1)
D DIMER PPP FEU-MCNC: 0.67 UG/ML(FEU)
ERYTHROCYTE [SEDIMENTATION RATE] IN BLOOD: 38 MM/HR (ref 0–30)
GLOBULIN SER CALC-MCNC: 4.1 G/DL (ref 2.8–4.5)
GLUCOSE SERPL-MCNC: 105 MG/DL (ref 65–100)
NT PRO BNP: 801 PG/ML
POTASSIUM SERPL-SCNC: 3.6 MMOL/L (ref 3.5–5.1)
PROCALCITONIN SERPL-MCNC: <0.05 NG/ML (ref 0–0.49)
PROT SERPL-MCNC: 7.6 G/DL (ref 6.3–8.2)
SODIUM SERPL-SCNC: 132 MMOL/L (ref 133–143)
TROPONIN I SERPL HS-MCNC: 12.3 PG/ML (ref 0–14)
TROPONIN I SERPL HS-MCNC: 14.3 PG/ML (ref 0–14)
TSH, 3RD GENERATION: 0.28 UIU/ML (ref 0.36–3.74)

## 2023-07-28 PROCEDURE — 84443 ASSAY THYROID STIM HORMONE: CPT

## 2023-07-28 PROCEDURE — 2580000003 HC RX 258: Performed by: NURSE PRACTITIONER

## 2023-07-28 PROCEDURE — 96372 THER/PROPH/DIAG INJ SC/IM: CPT

## 2023-07-28 PROCEDURE — 6370000000 HC RX 637 (ALT 250 FOR IP): Performed by: NURSE PRACTITIONER

## 2023-07-28 PROCEDURE — 85379 FIBRIN DEGRADATION QUANT: CPT

## 2023-07-28 PROCEDURE — 85652 RBC SED RATE AUTOMATED: CPT

## 2023-07-28 PROCEDURE — 36415 COLL VENOUS BLD VENIPUNCTURE: CPT

## 2023-07-28 PROCEDURE — G0378 HOSPITAL OBSERVATION PER HR: HCPCS

## 2023-07-28 PROCEDURE — 83880 ASSAY OF NATRIURETIC PEPTIDE: CPT

## 2023-07-28 PROCEDURE — 84145 PROCALCITONIN (PCT): CPT

## 2023-07-28 PROCEDURE — 80053 COMPREHEN METABOLIC PANEL: CPT

## 2023-07-28 PROCEDURE — 99223 1ST HOSP IP/OBS HIGH 75: CPT | Performed by: INTERNAL MEDICINE

## 2023-07-28 PROCEDURE — 71045 X-RAY EXAM CHEST 1 VIEW: CPT

## 2023-07-28 PROCEDURE — 6370000000 HC RX 637 (ALT 250 FOR IP): Performed by: INTERNAL MEDICINE

## 2023-07-28 PROCEDURE — 6360000002 HC RX W HCPCS: Performed by: NURSE PRACTITIONER

## 2023-07-28 PROCEDURE — 84484 ASSAY OF TROPONIN QUANT: CPT

## 2023-07-28 RX ORDER — SILDENAFIL CITRATE 20 MG/1
20 TABLET ORAL 3 TIMES DAILY
Status: DISCONTINUED | OUTPATIENT
Start: 2023-07-29 | End: 2023-07-29

## 2023-07-28 RX ORDER — ASCORBIC ACID 500 MG
500 TABLET ORAL DAILY
COMMUNITY

## 2023-07-28 RX ORDER — POLYETHYLENE GLYCOL 3350 17 G/17G
17 POWDER, FOR SOLUTION ORAL DAILY PRN
Status: DISCONTINUED | OUTPATIENT
Start: 2023-07-28 | End: 2023-07-30 | Stop reason: HOSPADM

## 2023-07-28 RX ORDER — SPIRONOLACTONE 25 MG/1
25 TABLET ORAL DAILY
Status: DISCONTINUED | OUTPATIENT
Start: 2023-07-29 | End: 2023-07-30 | Stop reason: HOSPADM

## 2023-07-28 RX ORDER — ALBUTEROL SULFATE 90 UG/1
1 AEROSOL, METERED RESPIRATORY (INHALATION) EVERY 6 HOURS PRN
Status: DISCONTINUED | OUTPATIENT
Start: 2023-07-28 | End: 2023-07-30 | Stop reason: HOSPADM

## 2023-07-28 RX ORDER — ESTRADIOL 1 MG/1
0.5 TABLET ORAL DAILY
Status: DISCONTINUED | OUTPATIENT
Start: 2023-07-28 | End: 2023-07-30 | Stop reason: HOSPADM

## 2023-07-28 RX ORDER — SODIUM CHLORIDE 0.9 % (FLUSH) 0.9 %
5-40 SYRINGE (ML) INJECTION EVERY 12 HOURS SCHEDULED
Status: DISCONTINUED | OUTPATIENT
Start: 2023-07-28 | End: 2023-07-30 | Stop reason: HOSPADM

## 2023-07-28 RX ORDER — CALCIUM CARBONATE 500 MG/1
500 TABLET, CHEWABLE ORAL 3 TIMES DAILY PRN
Status: DISCONTINUED | OUTPATIENT
Start: 2023-07-28 | End: 2023-07-30 | Stop reason: HOSPADM

## 2023-07-28 RX ORDER — ONDANSETRON 4 MG/1
4 TABLET, ORALLY DISINTEGRATING ORAL EVERY 8 HOURS PRN
Status: DISCONTINUED | OUTPATIENT
Start: 2023-07-28 | End: 2023-07-30 | Stop reason: HOSPADM

## 2023-07-28 RX ORDER — BENZONATATE 100 MG/1
100 CAPSULE ORAL 3 TIMES DAILY PRN
Status: DISCONTINUED | OUTPATIENT
Start: 2023-07-28 | End: 2023-07-29

## 2023-07-28 RX ORDER — SODIUM CHLORIDE 0.9 % (FLUSH) 0.9 %
5-40 SYRINGE (ML) INJECTION PRN
Status: DISCONTINUED | OUTPATIENT
Start: 2023-07-28 | End: 2023-07-30 | Stop reason: HOSPADM

## 2023-07-28 RX ORDER — ONDANSETRON 2 MG/ML
4 INJECTION INTRAMUSCULAR; INTRAVENOUS EVERY 6 HOURS PRN
Status: DISCONTINUED | OUTPATIENT
Start: 2023-07-28 | End: 2023-07-30 | Stop reason: HOSPADM

## 2023-07-28 RX ORDER — LANOLIN ALCOHOL/MO/W.PET/CERES
400 CREAM (GRAM) TOPICAL DAILY
Status: DISCONTINUED | OUTPATIENT
Start: 2023-07-28 | End: 2023-07-30 | Stop reason: HOSPADM

## 2023-07-28 RX ORDER — ENOXAPARIN SODIUM 100 MG/ML
40 INJECTION SUBCUTANEOUS DAILY
Status: DISCONTINUED | OUTPATIENT
Start: 2023-07-28 | End: 2023-07-30 | Stop reason: HOSPADM

## 2023-07-28 RX ORDER — LEVOTHYROXINE SODIUM 112 UG/1
112 TABLET ORAL
Status: DISCONTINUED | OUTPATIENT
Start: 2023-07-29 | End: 2023-07-30 | Stop reason: HOSPADM

## 2023-07-28 RX ORDER — SODIUM CHLORIDE 9 MG/ML
INJECTION, SOLUTION INTRAVENOUS PRN
Status: DISCONTINUED | OUTPATIENT
Start: 2023-07-28 | End: 2023-07-30 | Stop reason: HOSPADM

## 2023-07-28 RX ORDER — TADALAFIL 20 MG/1
40 TABLET ORAL DAILY
Status: DISCONTINUED | OUTPATIENT
Start: 2023-07-28 | End: 2023-07-28 | Stop reason: CLARIF

## 2023-07-28 RX ORDER — M-VIT,TX,IRON,MINS/CALC/FOLIC 27MG-0.4MG
1 TABLET ORAL DAILY
COMMUNITY

## 2023-07-28 RX ORDER — ACETAMINOPHEN 325 MG/1
650 TABLET ORAL EVERY 6 HOURS PRN
Status: DISCONTINUED | OUTPATIENT
Start: 2023-07-28 | End: 2023-07-30 | Stop reason: HOSPADM

## 2023-07-28 RX ADMIN — ENOXAPARIN SODIUM 40 MG: 100 INJECTION SUBCUTANEOUS at 16:45

## 2023-07-28 RX ADMIN — BENZONATATE 100 MG: 100 CAPSULE ORAL at 23:20

## 2023-07-28 RX ADMIN — BENZONATATE 100 MG: 100 CAPSULE ORAL at 20:13

## 2023-07-28 RX ADMIN — CALCIUM CARBONATE (ANTACID) CHEW TAB 500 MG 500 MG: 500 CHEW TAB at 23:38

## 2023-07-28 RX ADMIN — SODIUM CHLORIDE, PRESERVATIVE FREE 10 ML: 5 INJECTION INTRAVENOUS at 20:16

## 2023-07-28 NOTE — FLOWSHEET NOTE
07/28/23 1521   Dual Clinician Skin Assessment   Dual Skin Assessment (4 Eyes) WDL   Second Clinical  (First and Last Name) Gerald Ervin RN   Skin Integumentary    Skin Integumentary (WDL) X   Skin Color Pink   Skin Condition/Temp Warm;Dry;Varicosities   Skin Integrity Scars (comment); Ecchymosis   Location scattered           Dual skin assessment completed on admission with Gerald Ervin RN. Sacrum & heels C/D/I. Some varicosities on BLE. Some scarring, notably on abdomen. Otherwise no impairment visualized.

## 2023-07-28 NOTE — CONSULTS
PULMONARY/CRITICAL CARE CONSULT NOTE           7/28/2023    Yesica Meter                        Date of Admission:  7/28/2023    The patient's chart is reviewed and the patient is discussed with the staff. Subjective:     Patient is a 80 y.o.  female seen and evaluated at the request of Dr. Srinivas Vieyra. Cardiology consulted for us to see the patient who has her primary pulm hypertension group 1, while still has underlying ILD, and is CRUZ positive. Patient per notes is on chronic oxygen at 2 Lpm with exertion only,  tadalafil 40 mg daily as long as direct therapy. Patient apparently was not able to tolerate Ambrisentan due to elevated LFTs. Patient is followed in the pulmonary hypertension clinic with Dr. Herman Dinh last seen in February. Dr. Herman Dinh recommended starting Tyvaso but the patient did not want to pursue medication. Patient currently have been doing worse last days especially DBP at 100+ with CP. No wheeing. No worsening dyspnea. +dry cough. No fevers. No sick contacts. Laps all pending.      Review of Systems: Comprehensive ROS negative except in HPI    Current Outpatient Medications   Medication Instructions    acetaminophen (TYLENOL) 1,000 mg, Oral, EVERY 6 HOURS PRN    albuterol sulfate  (90 Base) MCG/ACT inhaler 1 puff, Inhalation    Cholecalciferol 2,000 Units, Oral, DAILY    estradiol (ESTRACE) 0.5 mg, Oral, DAILY    furosemide (LASIX) 20 mg, Oral, DAILY    levothyroxine (SYNTHROID) 112 mcg, Oral, DAILY BEFORE BREAKFAST    loperamide (IMODIUM) 2 mg, Oral, PRN    magnesium oxide (MAG-OX) 400 mg, Oral, DAILY    MILK THISTLE PO Oral    Multiple Vitamins-Minerals (THERAPEUTIC MULTIVITAMIN-MINERALS) tablet 1 tablet, Oral, DAILY    OXYGEN Inhalation, PRN, 3 L    spironolactone (ALDACTONE) 25 mg, Oral, Takes 1/2- 1 tablet daily    Tadalafil (PAH) 40 mg, Oral, DAILY    vitamin C (ASCORBIC ACID) 500 mg, Oral, DAILY    Zinc Acetate, Oral, (ZINC ACETATE PO) Oral, DAILY Mitral Valve: Mild regurgitation. Tricuspid Valve: Trace regurgitation. Unable to accurately assess RVSP due to inadequate TR. Signed by: Jeniffer Barron MD on 5/5/2022 12:22 PM, Signed by: Unknown Provider Result on 5/5/2022 12:00 AM    MICRO: No results for input(s): CULTURE in the last 72 hours. Assessment and Plan:  (Medical Decision Making)   Principal Problem:  Elevated DBP  --f/u with PMD since also with CP    Active Problems:  Chest pain  --f/u with cardiology. --labs pending      Chronic respiratory failure with hypoxia ([address])  --does not have Acute hypoxemic respiratory failure except with exertion but uses at home. Will have to check level prior to discharge. Continue with exertion. --will get overnight to r/o need at night    Pulmonary HTN -- Group 1  --with underlying ILD and CRUZ positive  --on tadalafil  --on lasix  --continue oxygen with exertion. --f/u with Dr. Harjit Godoy in pulmonary HTN clinic and if needed can add in Tyvaso in the future    Cough  --has a cough and will need to monitor  --currently responds to tessalon pears and will add  --cxr is  unremarkable      Full Code    More than 50% of the time documented was spent in face-to-face contact with the patient and in the care of the patient on the floor/unit where the patient is located. Thank you very much for this referral.  We appreciate the opportunity to participate in this patient's care. Will follow along with above stated plan.     Millie Mcneil MD

## 2023-07-28 NOTE — PROGRESS NOTES
[address] [facility], [facility], [address]  PHONE: [phone]     23    NAME:  Ariane Gomez  : 1940  MRN: [id number]       SUBJECTIVE:   Ariane Gomez is a 80 y.o. female seen for an [address] [place] [address] visit regarding the following:     Chief Complaint   Patient presents with    Hypertension    Fatigue       HPI: Here for pHTN (Echo 6/15 RV sig enlarged, RVSP 110)   severe PVC burden in past (prior VT ablation , intolerant to amio, Ic agents)   prior ITP, pulm fibrosis      Off letaris d/t  increasing LFTs. LHC 3/9/18: mild CAD, normal EF     [facility] Echo 2019 [facility]: low normal EF, RVSP 51    2019 [facility] admission for UTI, C diff. Echo 2020 and 2022: normal EF, mild TR     Feeling worse last 3 days, more ROSA now. Worsening, back on oxygen last 3 days. BP higher, some chest pressure walking around. Lost 20 pds. Off the walker. Seeing Dr. Shahida Alfaro with [facility]. Doing pulm rehab at [facility]. Was done well. Did extra lap at rehab this week and BP has been higher. DBP>90 now at home. Back on oxygen now. No CP or pressure. \"Something is getting worse last 3 days\"     Patient denies recent history of orthopnea, PND, excessive dizziness and/or syncope. , boyfriend of 36 yrs now. Not driving. Struggled with multiple different meds over the yrs, Dr. Shahida Alfaro has tried multiple in the past.             Past Medical History, Past Surgical History, Family history, Social History, and Medications were all reviewed with the patient today and updated as necessary.      Current Outpatient Medications   Medication Sig Dispense Refill    OXYGEN Inhale into the lungs as needed 3 L      furosemide (LASIX) 20 MG tablet Take 1 tablet by mouth daily 90 tablet 3    estradiol (ESTRACE) 0.5 MG tablet Take 1 tablet by mouth daily 90 tablet 4    Tadalafil, PAH, 20 MG tablet Take 2 tablets by mouth daily 60 tablet 11    MILK THISTLE PO Take by mouth

## 2023-07-29 ENCOUNTER — APPOINTMENT (OUTPATIENT)
Dept: NON INVASIVE DIAGNOSTICS | Age: 83
DRG: 204 | End: 2023-07-29
Attending: INTERNAL MEDICINE
Payer: MEDICARE

## 2023-07-29 LAB
ANION GAP SERPL CALC-SCNC: 5 MMOL/L (ref 2–11)
BASOPHILS # BLD: 0.1 K/UL (ref 0–0.2)
BASOPHILS NFR BLD: 1 % (ref 0–2)
BUN SERPL-MCNC: 16 MG/DL (ref 8–23)
CALCIUM SERPL-MCNC: 10.1 MG/DL (ref 8.3–10.4)
CHLORIDE SERPL-SCNC: 100 MMOL/L (ref 101–110)
CO2 SERPL-SCNC: 28 MMOL/L (ref 21–32)
CREAT SERPL-MCNC: 1 MG/DL (ref 0.6–1)
DIFFERENTIAL METHOD BLD: NORMAL
ECHO AO ASC DIAM: 3.8 CM
ECHO AO ASCENDING AORTA INDEX: 2.24 CM/M2
ECHO AO ROOT DIAM: 2.6 CM
ECHO AO ROOT INDEX: 1.53 CM/M2
ECHO AV AREA PEAK VELOCITY: 1.6 CM2
ECHO AV AREA VTI: 2 CM2
ECHO AV AREA/BSA PEAK VELOCITY: 0.9 CM2/M2
ECHO AV AREA/BSA VTI: 1.2 CM2/M2
ECHO AV MEAN GRADIENT: 6 MMHG
ECHO AV MEAN VELOCITY: 1.2 M/S
ECHO AV PEAK GRADIENT: 11 MMHG
ECHO AV PEAK VELOCITY: 1.6 M/S
ECHO AV VELOCITY RATIO: 0.56
ECHO AV VTI: 33.7 CM
ECHO BSA: 1.74 M2
ECHO IVC PROX: 0.9 CM
ECHO LA AREA 2C: 18.7 CM2
ECHO LA AREA 4C: 18 CM2
ECHO LA DIAMETER INDEX: 2.47 CM/M2
ECHO LA DIAMETER: 4.2 CM
ECHO LA MAJOR AXIS: 5.5 CM
ECHO LA MINOR AXIS: 4.2 CM
ECHO LA TO AORTIC ROOT RATIO: 1.62
ECHO LA VOL 2C: 51 ML (ref 22–52)
ECHO LA VOL 4C: 47 ML (ref 22–52)
ECHO LA VOL BP: 51 ML (ref 22–52)
ECHO LA VOL/BSA BIPLANE: 30 ML/M2 (ref 16–34)
ECHO LA VOLUME INDEX A2C: 30 ML/M2 (ref 16–34)
ECHO LA VOLUME INDEX A4C: 28 ML/M2 (ref 16–34)
ECHO LV E' LATERAL VELOCITY: 11 CM/S
ECHO LV E' SEPTAL VELOCITY: 6 CM/S
ECHO LV EDV A2C: 43 ML
ECHO LV EDV A4C: 73 ML
ECHO LV EDV INDEX A4C: 43 ML/M2
ECHO LV EDV NDEX A2C: 25 ML/M2
ECHO LV EJECTION FRACTION A2C: 42 %
ECHO LV EJECTION FRACTION A4C: 42 %
ECHO LV EJECTION FRACTION BIPLANE: 45 % (ref 55–100)
ECHO LV ESV A2C: 25 ML
ECHO LV ESV A4C: 42 ML
ECHO LV ESV INDEX A2C: 15 ML/M2
ECHO LV ESV INDEX A4C: 25 ML/M2
ECHO LV FRACTIONAL SHORTENING: 31 % (ref 28–44)
ECHO LV INTERNAL DIMENSION DIASTOLE INDEX: 2.82 CM/M2
ECHO LV INTERNAL DIMENSION DIASTOLIC: 4.8 CM (ref 3.9–5.3)
ECHO LV INTERNAL DIMENSION SYSTOLIC INDEX: 1.94 CM/M2
ECHO LV INTERNAL DIMENSION SYSTOLIC: 3.3 CM
ECHO LV IVSD: 0.8 CM (ref 0.6–0.9)
ECHO LV MASS 2D: 116.6 G (ref 67–162)
ECHO LV MASS INDEX 2D: 68.6 G/M2 (ref 43–95)
ECHO LV POSTERIOR WALL DIASTOLIC: 0.7 CM (ref 0.6–0.9)
ECHO LV RELATIVE WALL THICKNESS RATIO: 0.29
ECHO LVOT AREA: 3.1 CM2
ECHO LVOT AV VTI INDEX: 0.63
ECHO LVOT DIAM: 2 CM
ECHO LVOT MEAN GRADIENT: 1 MMHG
ECHO LVOT PEAK GRADIENT: 3 MMHG
ECHO LVOT PEAK VELOCITY: 0.9 M/S
ECHO LVOT STROKE VOLUME INDEX: 39.2 ML/M2
ECHO LVOT SV: 66.6 ML
ECHO LVOT VTI: 21.2 CM
ECHO MV A VELOCITY: 0.9 M/S
ECHO MV E DECELERATION TIME (DT): 206 MS
ECHO MV E VELOCITY: 0.6 M/S
ECHO MV E/A RATIO: 0.67
ECHO MV E/E' LATERAL: 5.45
ECHO MV E/E' RATIO (AVERAGED): 7.73
ECHO MV E/E' SEPTAL: 10
ECHO PV MAX VELOCITY: 1 M/S
ECHO PV PEAK GRADIENT: 4 MMHG
ECHO RV BASAL DIMENSION: 3.9 CM
ECHO RV TAPSE: 2.3 CM (ref 1.7–?)
EOSINOPHIL # BLD: 0.4 K/UL (ref 0–0.8)
EOSINOPHIL NFR BLD: 4 % (ref 0.5–7.8)
ERYTHROCYTE [DISTWIDTH] IN BLOOD BY AUTOMATED COUNT: 13.5 % (ref 11.9–14.6)
GLUCOSE SERPL-MCNC: 98 MG/DL (ref 65–100)
HCT VFR BLD AUTO: 41.7 % (ref 35.8–46.3)
HGB BLD-MCNC: 13.8 G/DL (ref 11.7–15.4)
IMM GRANULOCYTES # BLD AUTO: 0 K/UL (ref 0–0.5)
IMM GRANULOCYTES NFR BLD AUTO: 0 % (ref 0–5)
LYMPHOCYTES # BLD: 3 K/UL (ref 0.5–4.6)
LYMPHOCYTES NFR BLD: 32 % (ref 13–44)
MCH RBC QN AUTO: 31.4 PG (ref 26.1–32.9)
MCHC RBC AUTO-ENTMCNC: 33.1 G/DL (ref 31.4–35)
MCV RBC AUTO: 94.8 FL (ref 82–102)
MONOCYTES # BLD: 0.9 K/UL (ref 0.1–1.3)
MONOCYTES NFR BLD: 10 % (ref 4–12)
NEUTS SEG # BLD: 4.8 K/UL (ref 1.7–8.2)
NEUTS SEG NFR BLD: 53 % (ref 43–78)
NRBC # BLD: 0 K/UL (ref 0–0.2)
PLATELET # BLD AUTO: 275 K/UL (ref 150–450)
PMV BLD AUTO: 9.4 FL (ref 9.4–12.3)
POTASSIUM SERPL-SCNC: 3.9 MMOL/L (ref 3.5–5.1)
RBC # BLD AUTO: 4.4 M/UL (ref 4.05–5.2)
SODIUM SERPL-SCNC: 133 MMOL/L (ref 133–143)
WBC # BLD AUTO: 9.1 K/UL (ref 4.3–11.1)

## 2023-07-29 PROCEDURE — 6370000000 HC RX 637 (ALT 250 FOR IP): Performed by: NURSE PRACTITIONER

## 2023-07-29 PROCEDURE — 6360000004 HC RX CONTRAST MEDICATION: Performed by: INTERNAL MEDICINE

## 2023-07-29 PROCEDURE — 99232 SBSQ HOSP IP/OBS MODERATE 35: CPT | Performed by: INTERNAL MEDICINE

## 2023-07-29 PROCEDURE — 85025 COMPLETE CBC W/AUTO DIFF WBC: CPT

## 2023-07-29 PROCEDURE — 80048 BASIC METABOLIC PNL TOTAL CA: CPT

## 2023-07-29 PROCEDURE — 6370000000 HC RX 637 (ALT 250 FOR IP)

## 2023-07-29 PROCEDURE — 36415 COLL VENOUS BLD VENIPUNCTURE: CPT

## 2023-07-29 PROCEDURE — G0378 HOSPITAL OBSERVATION PER HR: HCPCS

## 2023-07-29 PROCEDURE — 2580000003 HC RX 258: Performed by: INTERNAL MEDICINE

## 2023-07-29 PROCEDURE — 94762 N-INVAS EAR/PLS OXIMTRY CONT: CPT

## 2023-07-29 PROCEDURE — 2580000003 HC RX 258: Performed by: NURSE PRACTITIONER

## 2023-07-29 PROCEDURE — C8929 TTE W OR WO FOL WCON,DOPPLER: HCPCS

## 2023-07-29 RX ORDER — BENZONATATE 100 MG/1
200 CAPSULE ORAL 3 TIMES DAILY PRN
Status: DISCONTINUED | OUTPATIENT
Start: 2023-07-29 | End: 2023-07-30 | Stop reason: HOSPADM

## 2023-07-29 RX ORDER — TADALAFIL 20 MG/1
40 TABLET ORAL DAILY
Status: DISCONTINUED | OUTPATIENT
Start: 2023-07-29 | End: 2023-07-30 | Stop reason: HOSPADM

## 2023-07-29 RX ADMIN — BENZONATATE 200 MG: 100 CAPSULE ORAL at 21:08

## 2023-07-29 RX ADMIN — CALCIUM CARBONATE (ANTACID) CHEW TAB 500 MG 500 MG: 500 CHEW TAB at 21:08

## 2023-07-29 RX ADMIN — LEVOTHYROXINE SODIUM 112 MCG: 0.11 TABLET ORAL at 06:09

## 2023-07-29 RX ADMIN — ESTRADIOL 0.5 MG: 1 TABLET ORAL at 09:39

## 2023-07-29 RX ADMIN — SODIUM CHLORIDE, PRESERVATIVE FREE 10 ML: 5 INJECTION INTRAVENOUS at 21:08

## 2023-07-29 RX ADMIN — SPIRONOLACTONE 25 MG: 25 TABLET ORAL at 09:43

## 2023-07-29 RX ADMIN — MAGNESIUM GLUCONATE 500 MG ORAL TABLET 400 MG: 500 TABLET ORAL at 09:39

## 2023-07-29 RX ADMIN — TADALAFIL 40 MG: 20 TABLET ORAL at 09:38

## 2023-07-29 RX ADMIN — SODIUM CHLORIDE, PRESERVATIVE FREE 0.75 ML: 5 INJECTION INTRAVENOUS at 08:50

## 2023-07-29 RX ADMIN — BENZONATATE 200 MG: 100 CAPSULE ORAL at 11:08

## 2023-07-29 RX ADMIN — SODIUM CHLORIDE, PRESERVATIVE FREE 10 ML: 5 INJECTION INTRAVENOUS at 09:45

## 2023-07-29 ASSESSMENT — PAIN SCALES - GENERAL
PAINLEVEL_OUTOF10: 0

## 2023-07-30 VITALS
HEART RATE: 66 BPM | RESPIRATION RATE: 20 BRPM | DIASTOLIC BLOOD PRESSURE: 74 MMHG | WEIGHT: 156.9 LBS | BODY MASS INDEX: 29.62 KG/M2 | TEMPERATURE: 97.5 F | SYSTOLIC BLOOD PRESSURE: 151 MMHG | OXYGEN SATURATION: 100 % | HEIGHT: 61 IN

## 2023-07-30 LAB
ANION GAP SERPL CALC-SCNC: 7 MMOL/L (ref 2–11)
APPEARANCE UR: ABNORMAL
BACTERIA URNS QL MICRO: ABNORMAL /HPF
BASOPHILS # BLD: 0.1 K/UL (ref 0–0.2)
BASOPHILS NFR BLD: 1 % (ref 0–2)
BILIRUB UR QL: NEGATIVE
BUN SERPL-MCNC: 17 MG/DL (ref 8–23)
CALCIUM SERPL-MCNC: 9.3 MG/DL (ref 8.3–10.4)
CASTS URNS QL MICRO: ABNORMAL /LPF
CHLORIDE SERPL-SCNC: 103 MMOL/L (ref 101–110)
CO2 SERPL-SCNC: 24 MMOL/L (ref 21–32)
COLOR UR: ABNORMAL
CREAT SERPL-MCNC: 0.9 MG/DL (ref 0.6–1)
DIFFERENTIAL METHOD BLD: ABNORMAL
EOSINOPHIL # BLD: 0.3 K/UL (ref 0–0.8)
EOSINOPHIL NFR BLD: 4 % (ref 0.5–7.8)
EPI CELLS #/AREA URNS HPF: ABNORMAL /HPF
ERYTHROCYTE [DISTWIDTH] IN BLOOD BY AUTOMATED COUNT: 13.5 % (ref 11.9–14.6)
GLUCOSE SERPL-MCNC: 91 MG/DL (ref 65–100)
GLUCOSE UR STRIP.AUTO-MCNC: NEGATIVE MG/DL
HCT VFR BLD AUTO: 41.8 % (ref 35.8–46.3)
HGB BLD-MCNC: 13.5 G/DL (ref 11.7–15.4)
HGB UR QL STRIP: ABNORMAL
IMM GRANULOCYTES # BLD AUTO: 0 K/UL (ref 0–0.5)
IMM GRANULOCYTES NFR BLD AUTO: 1 % (ref 0–5)
KETONES UR QL STRIP.AUTO: NEGATIVE MG/DL
LEUKOCYTE ESTERASE UR QL STRIP.AUTO: ABNORMAL
LYMPHOCYTES # BLD: 2 K/UL (ref 0.5–4.6)
LYMPHOCYTES NFR BLD: 30 % (ref 13–44)
MCH RBC QN AUTO: 30.8 PG (ref 26.1–32.9)
MCHC RBC AUTO-ENTMCNC: 32.3 G/DL (ref 31.4–35)
MCV RBC AUTO: 95.4 FL (ref 82–102)
MONOCYTES # BLD: 0.7 K/UL (ref 0.1–1.3)
MONOCYTES NFR BLD: 11 % (ref 4–12)
MUCOUS THREADS URNS QL MICRO: 0 /LPF
NEUTS SEG # BLD: 3.6 K/UL (ref 1.7–8.2)
NEUTS SEG NFR BLD: 53 % (ref 43–78)
NITRITE UR QL STRIP.AUTO: NEGATIVE
NRBC # BLD: 0 K/UL (ref 0–0.2)
PH UR STRIP: 7 (ref 5–9)
PLATELET # BLD AUTO: 201 K/UL (ref 150–450)
PMV BLD AUTO: 9 FL (ref 9.4–12.3)
POTASSIUM SERPL-SCNC: 4.1 MMOL/L (ref 3.5–5.1)
PROT UR STRIP-MCNC: NEGATIVE MG/DL
RBC # BLD AUTO: 4.38 M/UL (ref 4.05–5.2)
RBC #/AREA URNS HPF: ABNORMAL /HPF
SODIUM SERPL-SCNC: 134 MMOL/L (ref 133–143)
SP GR UR REFRACTOMETRY: 1.01 (ref 1–1.02)
URINE CULTURE IF INDICATED: ABNORMAL
UROBILINOGEN UR QL STRIP.AUTO: 0.2 EU/DL (ref 0.2–1)
WBC # BLD AUTO: 6.7 K/UL (ref 4.3–11.1)
WBC URNS QL MICRO: >100 /HPF

## 2023-07-30 PROCEDURE — 99232 SBSQ HOSP IP/OBS MODERATE 35: CPT | Performed by: INTERNAL MEDICINE

## 2023-07-30 PROCEDURE — 85025 COMPLETE CBC W/AUTO DIFF WBC: CPT

## 2023-07-30 PROCEDURE — 6370000000 HC RX 637 (ALT 250 FOR IP)

## 2023-07-30 PROCEDURE — 2580000003 HC RX 258: Performed by: NURSE PRACTITIONER

## 2023-07-30 PROCEDURE — 81001 URINALYSIS AUTO W/SCOPE: CPT

## 2023-07-30 PROCEDURE — G0378 HOSPITAL OBSERVATION PER HR: HCPCS

## 2023-07-30 PROCEDURE — 36415 COLL VENOUS BLD VENIPUNCTURE: CPT

## 2023-07-30 PROCEDURE — 2060000000 HC ICU INTERMEDIATE R&B

## 2023-07-30 PROCEDURE — 80048 BASIC METABOLIC PNL TOTAL CA: CPT

## 2023-07-30 PROCEDURE — 6370000000 HC RX 637 (ALT 250 FOR IP): Performed by: NURSE PRACTITIONER

## 2023-07-30 PROCEDURE — 87086 URINE CULTURE/COLONY COUNT: CPT

## 2023-07-30 RX ORDER — BENZONATATE 200 MG/1
200 CAPSULE ORAL 3 TIMES DAILY PRN
Qty: 21 CAPSULE | Refills: 1 | Status: SHIPPED | OUTPATIENT
Start: 2023-07-30 | End: 2023-08-13

## 2023-07-30 RX ADMIN — BENZONATATE 200 MG: 100 CAPSULE ORAL at 08:16

## 2023-07-30 RX ADMIN — MAGNESIUM GLUCONATE 500 MG ORAL TABLET 400 MG: 500 TABLET ORAL at 08:16

## 2023-07-30 RX ADMIN — CHOLECALCIFEROL TAB 125 MCG (5000 UNIT) 5000 UNITS: 125 TAB at 08:29

## 2023-07-30 RX ADMIN — ESTRADIOL 0.5 MG: 1 TABLET ORAL at 08:16

## 2023-07-30 RX ADMIN — SODIUM CHLORIDE, PRESERVATIVE FREE 10 ML: 5 INJECTION INTRAVENOUS at 08:17

## 2023-07-30 RX ADMIN — SPIRONOLACTONE 25 MG: 25 TABLET ORAL at 08:16

## 2023-07-30 RX ADMIN — LEVOTHYROXINE SODIUM 112 MCG: 0.11 TABLET ORAL at 07:34

## 2023-07-30 RX ADMIN — TADALAFIL 40 MG: 20 TABLET ORAL at 08:27

## 2023-07-30 ASSESSMENT — PAIN SCALES - GENERAL
PAINLEVEL_OUTOF10: 0
PAINLEVEL_OUTOF10: 0

## 2023-07-30 NOTE — PROGRESS NOTES
00:17 : Patient disconnected from oximetry to go to the bathroom  00:30 : Patient back on overnight oximetry, will continue to monitor

## 2023-07-30 NOTE — DISCHARGE SUMMARY
[facility] Discharge Summary     Patient ID:  Dave Helton  [id number]  11 y.o.  1940    Admit date: 7/28/2023    Discharge date:  07/30/2023    Admitting Physician: Neelima Caraballo DO     Discharge Physician: AINSLEY Perez - GRACIELA/Dr. Addison Zhao    Admission Diagnoses: Shortness of breath [R06.02]    Discharge Diagnoses:   Patient Active Problem List    Diagnosis    Chronic renal disease, stage III [facility]) [277653]    Acute respiratory failure with hypoxia (HCC)    Diastolic CHF, chronic (HCC)    Shortness of breath    Obesity (BMI 30.0-34. 9)    Postmenopausal    Acquired hypothyroidism    Primary pulmonary hypertension (HCC)    Gastroesophageal reflux disease    Pulmonary hypertension (HCC)    Dyspnea    Hypoxemia    PVC (premature ventricular contraction)    Arthritis    Hyperlipidemia    Hypertension       Cardiology Procedures this admission:  EchoCardiogram  Consults: pulmonary/intensive care    Hospital Course: Patient was seen at the office of [facility] by Dr. Marian Isabel  for complaints of feeling worse last 3 days, more ROSA now. Worsening, back on oxygen last 3 days. BP higher, some chest pressure walking around. Patient was admitted for work up. CXR wnl.  HS trop 14.3, d dimer 0.67. Echo results:  07/28/23    TRANSTHORACIC ECHOCARDIOGRAM (TTE) COMPLETE (CONTRAST/BUBBLE/3D PRN) 07/29/2023 12:54 PM (Final)    Interpretation Summary    Left Ventricle: Mildly reduced left ventricular systolic function with a visually estimated EF of 45 - 50%. Left ventricle size is normal. Normal wall thickness. Grade I diastolic dysfunction with normal LAP. Aortic Valve: Trileaflet valve. Mild sclerosis of the aortic valve cusp. Mitral Valve: Mild regurgitation. Aorta: Normal sized aortic root. Mildly dilated ascending aorta. Ao ascending diameter is 3.8 cm. Contrast used: Definity.     Signed by: Jewels Cole MD on 7/29/2023 12:54 PM    Pulmonary was Neutrophils % 53 43 - 78 %    Lymphocytes % 30 13 - 44 %    Monocytes % 11 4.0 - 12.0 %    Eosinophils % 4 0.5 - 7.8 %    Basophils % 1 0.0 - 2.0 %    Immature Granulocytes 1 0.0 - 5.0 %    Neutrophils Absolute 3.6 1.7 - 8.2 K/UL    Lymphocytes Absolute 2.0 0.5 - 4.6 K/UL    Monocytes Absolute 0.7 0.1 - 1.3 K/UL    Eosinophils Absolute 0.3 0.0 - 0.8 K/UL    Basophils Absolute 0.1 0.0 - 0.2 K/UL    Absolute Immature Granulocyte 0.0 0.0 - 0.5 K/UL   Urinalysis with Reflex to Culture    Collection Time: 07/30/23  8:58 AM    Specimen: Urine   Result Value Ref Range    Color, UA YELLOW/STRAW      Appearance CLOUDY      Specific McClure, UA 1.006 1.001 - 1.023      pH, Urine 7.0 5.0 - 9.0      Protein, UA Negative NEG mg/dL    Glucose, UA Negative mg/dL    Ketones, Urine Negative NEG mg/dL    Bilirubin Urine Negative NEG      Blood, Urine TRACE (A) NEG      Urobilinogen, Urine 0.2 0.2 - 1.0 EU/dL    Nitrite, Urine Negative NEG      Leukocyte Esterase, Urine LARGE (A) NEG      Urine Culture if Indicated URINE CULTURE ORDERED      WBC, UA >100 (A) U4 /hpf    RBC, UA 0-5 U5 /hpf    BACTERIA, URINE 1+ (A) NEG /hpf    Epithelial Cells UA 0-5 U5 /hpf    Casts 0-2 U2 /lpf    Mucus, UA 0 0 /lpf         Patient Instructions:     Current Discharge Medication List        START taking these medications    Details   benzonatate (TESSALON) 200 MG capsule Take 1 capsule by mouth 3 times daily as needed for Cough  Qty: 21 capsule, Refills: 1           CONTINUE these medications which have NOT CHANGED    Details   vitamin C (ASCORBIC ACID) 500 MG tablet Take 1 tablet by mouth daily      Multiple Vitamins-Minerals (THERAPEUTIC MULTIVITAMIN-MINERALS) tablet Take 1 tablet by mouth daily      OXYGEN Inhale into the lungs as needed 3 L      furosemide (LASIX) 20 MG tablet Take 1 tablet by mouth daily  Qty: 90 tablet, Refills: 3      estradiol (ESTRACE) 0.5 MG tablet Take 1 tablet by mouth daily  Qty: 90 tablet, Refills: 4    Associated

## 2023-07-30 NOTE — PROGRESS NOTES
Paco Hernandez  Admission Date: 7/28/2023         Daily Progress Note: 7/30/2023    The patient's chart is reviewed and the patient is discussed with the staff. Background: Patient is a 80 y.o.  female seen and evaluated at the request of Dr. Fede Child. Cardiology consulted for us to see the patient who has her primary pulm hypertension group 1, while still has underlying ILD, and is CRUZ positive. Patient per notes is on chronic oxygen at 2 Lpm with exertion only,  tadalafil 40 mg daily as long as direct therapy. Patient apparently was not able to tolerate Ambrisentan due to elevated LFTs. Patient is followed in the pulmonary hypertension clinic with Dr. Addy Alejandra last seen in February. Dr. Addy Alejandra recommended starting Tyvaso but the patient did not want to pursue medication. Patient currently have been doing worse last days especially DBP at 100+ with CP. No wheezing. No worsening dyspnea. +dry cough. No fevers. No sick contacts. Completed overnight O2 study: 42 desat events less than 3 min noted; mean SpO2 92.7% with lowest SpO2 50% for 16 seconds. Patient did relay she was moving during the times it was desatting. Labs: procal <0.05, d-dimer 0.67, troponin slightly elevated at 14.3 (noted patient on biotin, may affect results)    Repeated overnight study: overall improved; patient did get up to the restroom twice where desat below 89% is documented. Mean SpO2 documented was 94.3%. ECHO completed yesterday showing mildly decreased EF. Subjective:     Sitting up in chair on RA, denies difficulties or SOB. Reviewed overnight- does not appear to be hypoxic. May be getting discharged today.      Current Facility-Administered Medications   Medication Dose Route Frequency    vitamin D3 (CHOLECALCIFEROL) tablet 5,000 Units  5,000 Units Oral Daily    Tadalafil (PAH) tablet 40 mg (Patient Supplied)  40 mg Oral Daily    benzonatate (TESSALON) capsule 200 mg  200 mg Oral strength, fluent speech  Psychiatric:  calm, appropriate, oriented x 4    Imaging: I performed an independent interpretation of the patient's images. CXR:   7/28/23 7/23/18 2/27/23    LAB:  Recent Labs     07/28/23 1750 07/29/23  0514 07/30/23  0530   WBC  --  9.1 6.7   HGB  --  13.8 13.5   HCT  --  41.7 41.8   PLT  --  275 201   PROCAL <0.05  --   --      Recent Labs     07/28/23 1757 07/29/23  0514 07/30/23  0530   * 133 134   K 3.6 3.9 4.1   CL 98* 100* 103   CO2 28 28 24   BUN 18 16 17   CREATININE 1.10* 1.00 0.90   BILITOT 0.6  --   --    AST 21  --   --    ALT 12  --   --    ALKPHOS 66  --   --      Recent Labs     07/28/23 1757 07/28/23  2045   TROPHS 12.3 14.3*   NTPROBNP 801*  --      Recent Labs     07/28/23 1757 07/29/23  0514 07/30/23  0530   GLUCOSE 105* 98 91      Microbiology:   No results for input(s): CULTURE in the last 72 hours. ECHO: 07/28/23    TRANSTHORACIC ECHOCARDIOGRAM (TTE) COMPLETE (CONTRAST/BUBBLE/3D PRN) 07/29/2023 12:54 PM (Final)    Interpretation Summary    Left Ventricle: Mildly reduced left ventricular systolic function with a visually estimated EF of 45 - 50%. Left ventricle size is normal. Normal wall thickness. Grade I diastolic dysfunction with normal LAP. Aortic Valve: Trileaflet valve. Mild sclerosis of the aortic valve cusp. Mitral Valve: Mild regurgitation. Aorta: Normal sized aortic root. Mildly dilated ascending aorta. Ao ascending diameter is 3.8 cm. Contrast used: Definity. Signed by: Candido Diaz MD on 7/29/2023 12:54 PM    Assessment and Plan:  (Medical Decision Making)   Impression: 79 y/o female with ILD, pulmonary HTN/positive CRUZ with 2L O2 use on exertion at home. Repeat overnight study-showed no hypoxia. ECHO completed. May be getting discharged. Principal Problem:  Shortness of breath  Chest pain  Cough  Plan: ECHO completed; Cardiology saw; hx of pulmonary HTN/ILD; F/U in September with Dr. Viki López;  Nilda

## 2023-07-30 NOTE — DISCHARGE INSTRUCTIONS
The patient is being discharged home in stable condition on a low saturated fat, low cholesterol and low salt diet. The patient is instructed to advance activities as tolerated to the limit of fatigue or shortness of breath. The patient is instructed to call the office or return to the ER for immediate evaluation for any shortness of breath or chest pain not relieved by NTG.

## 2023-07-30 NOTE — PROGRESS NOTES
05:16 Patient disconnected to go to the bathroom  05:30 Patient back on oximetry    06:00 Patient overnight oximetry completed, placed in patient's chart (under procedures tab)

## 2023-07-31 ENCOUNTER — TELEPHONE (OUTPATIENT)
Age: 83
End: 2023-07-31

## 2023-07-31 LAB
BACTERIA SPEC CULT: ABNORMAL
BACTERIA SPEC CULT: ABNORMAL
SERVICE CMNT-IMP: ABNORMAL

## 2023-07-31 NOTE — TELEPHONE ENCOUNTER
I spoke w/pt. she said she is a little weak otherwise she feels ok. She has an appt. 8/14 w/Pulmonary. She said at this time she does not feel she needs to be seen urgently. Will call here if she feels she needs to be seen sooner than her Sept.appt.

## 2023-07-31 NOTE — TELEPHONE ENCOUNTER
----- Message from Deidre Bhatti, DO sent at 7/31/2023  8:47 AM EDT -----  Please call her, see how she is feeling since discharge yesterday from [facility]. Seeing me Sept.    I can see sooner if needed. Is she doing ok? Feeling better?   Thanks

## 2023-08-01 ENCOUNTER — HOSPITAL ENCOUNTER (OUTPATIENT)
Dept: CARDIAC REHAB | Age: 83
Setting detail: RECURRING SERIES
End: 2023-08-01
Payer: MEDICARE

## 2023-08-08 ENCOUNTER — APPOINTMENT (OUTPATIENT)
Dept: CARDIAC REHAB | Age: 83
End: 2023-08-08
Payer: MEDICARE

## 2023-08-10 ENCOUNTER — HOSPITAL ENCOUNTER (OUTPATIENT)
Dept: CARDIAC REHAB | Age: 83
Setting detail: RECURRING SERIES
End: 2023-08-10
Payer: MEDICARE

## 2023-08-10 ASSESSMENT — EXERCISE STRESS TEST: PEAK_BP: 120/80

## 2023-08-10 ASSESSMENT — PULMONARY FUNCTION TESTS
FEV1 (%PREDICTED): 89
FVC (LITERS): 2
FEV1/FVC: 72

## 2023-08-10 ASSESSMENT — LIFESTYLE VARIABLES: SMOKELESS_TOBACCO: NO

## 2023-08-14 NOTE — PROGRESS NOTES
Physician Progress Note      PATIENT:               Jessica Edgar  CSN #:                  [id number]  :                       1940  ADMIT DATE:       2023 3:17 PM  [address] [facility] DATE:        2023 1:54 PM  RESPONDING  PROVIDER #:        Frank Leonardo NP          QUERY TEXT:    Pt noted to have pulmonary hypertension and ILD. If possible, please document   in progress notes if the documented shortness of breath due to one of the   following: The medical record reflects the following:  Risk Factors: hx ILD, pulmonary HTN  Clinical Indicators: Documented ILD and pulmonary HTN, positive CRUZ. Treatment: Pulmonary consult. Options provided:  -- SOB due to pulmonary hypertension  -- SOB due to ILD  -- SOB due to both ILD and pulmonary hypertension  -- SOB due to, please specify. -- Other - I will add my own diagnosis  -- Disagree - Not applicable / Not valid  -- Disagree - Clinically unable to determine / Unknown  -- Refer to Clinical Documentation Reviewer    PROVIDER RESPONSE TEXT:    This patient has SOB due to pulmonary hypertension.     Query created by: Erick Patino on 2023 2:02 PM      Electronically signed by:  Frank Leonardo NP 2023 8:26 AM

## 2023-08-21 ENCOUNTER — PATIENT MESSAGE (OUTPATIENT)
Dept: GYNECOLOGY | Age: 83
End: 2023-08-21

## 2023-08-21 DIAGNOSIS — N95.1 MENOPAUSE SYNDROME: ICD-10-CM

## 2023-08-21 RX ORDER — ESTRADIOL 0.5 MG/1
0.5 TABLET ORAL DAILY
Qty: 90 TABLET | Refills: 1 | Status: SHIPPED | OUTPATIENT
Start: 2023-08-21

## 2023-08-21 NOTE — TELEPHONE ENCOUNTER
From: Tonya Little  To: Dr. Matta Block: 8/21/2023 10:19 AM EDT  Subject: Estradiol Refill    Dr. Jose Stafford, I just ran out of my Estradiol and received a request from the Pharmacy to have it refilled.  Would you please send the request to [address] [place] [address] , [address] [place] [address].[place] [phone] Thank you, Tonya Little

## 2023-08-22 ENCOUNTER — HOSPITAL ENCOUNTER (OUTPATIENT)
Dept: CARDIAC REHAB | Age: 83
Setting detail: RECURRING SERIES
Discharge: HOME | End: 2023-08-25
Payer: MEDICARE

## 2023-08-22 VITALS — BODY MASS INDEX: 29.32 KG/M2 | WEIGHT: 155.2 LBS

## 2023-08-22 PROCEDURE — G0239 OTH RESP PROC, GROUP: HCPCS

## 2023-08-22 ASSESSMENT — EXERCISE STRESS TEST
PEAK_BP: 118/66
PEAK_METS: 2.6
PEAK_HR: 104

## 2023-08-24 ENCOUNTER — HOSPITAL ENCOUNTER (OUTPATIENT)
Dept: CARDIAC REHAB | Age: 83
Setting detail: RECURRING SERIES
Discharge: HOME | End: 2023-08-27
Payer: MEDICARE

## 2023-08-24 PROCEDURE — G0239 OTH RESP PROC, GROUP: HCPCS

## 2023-08-24 ASSESSMENT — EXERCISE STRESS TEST
PEAK_METS: 2.5
PEAK_HR: 81
PEAK_BP: 116/70

## 2023-08-25 ASSESSMENT — PULMONARY FUNCTION TESTS
FEV1 (%PREDICTED): 89
FEV1/FVC: 72
FVC (LITERS): 2

## 2023-08-25 ASSESSMENT — EXERCISE STRESS TEST: PEAK_BP: 120/80

## 2023-08-25 ASSESSMENT — LIFESTYLE VARIABLES: SMOKELESS_TOBACCO: NO

## 2023-09-15 ENCOUNTER — NURSE ONLY (OUTPATIENT)
Dept: PULMONOLOGY | Age: 83
End: 2023-09-15

## 2023-09-15 ENCOUNTER — OFFICE VISIT (OUTPATIENT)
Dept: PULMONOLOGY | Age: 83
End: 2023-09-15
Payer: MEDICARE

## 2023-09-15 VITALS
OXYGEN SATURATION: 84 % | HEIGHT: 61 IN | WEIGHT: 155 LBS | RESPIRATION RATE: 20 BRPM | TEMPERATURE: 98 F | HEART RATE: 69 BPM | BODY MASS INDEX: 29.27 KG/M2 | SYSTOLIC BLOOD PRESSURE: 124 MMHG | DIASTOLIC BLOOD PRESSURE: 74 MMHG

## 2023-09-15 DIAGNOSIS — R06.09 DYSPNEA ON EXERTION: ICD-10-CM

## 2023-09-15 DIAGNOSIS — I27.20 PULMONARY HYPERTENSION (HCC): Primary | ICD-10-CM

## 2023-09-15 DIAGNOSIS — R06.02 SOB (SHORTNESS OF BREATH): ICD-10-CM

## 2023-09-15 DIAGNOSIS — R76.8 POSITIVE ANA (ANTINUCLEAR ANTIBODY): ICD-10-CM

## 2023-09-15 DIAGNOSIS — J84.9 ILD (INTERSTITIAL LUNG DISEASE) (HCC): ICD-10-CM

## 2023-09-15 PROCEDURE — 3078F DIAST BP <80 MM HG: CPT | Performed by: STUDENT IN AN ORGANIZED HEALTH CARE EDUCATION/TRAINING PROGRAM

## 2023-09-15 PROCEDURE — G8427 DOCREV CUR MEDS BY ELIG CLIN: HCPCS | Performed by: STUDENT IN AN ORGANIZED HEALTH CARE EDUCATION/TRAINING PROGRAM

## 2023-09-15 PROCEDURE — 99214 OFFICE O/P EST MOD 30 MIN: CPT | Performed by: STUDENT IN AN ORGANIZED HEALTH CARE EDUCATION/TRAINING PROGRAM

## 2023-09-15 PROCEDURE — 1036F TOBACCO NON-USER: CPT | Performed by: STUDENT IN AN ORGANIZED HEALTH CARE EDUCATION/TRAINING PROGRAM

## 2023-09-15 PROCEDURE — G8400 PT W/DXA NO RESULTS DOC: HCPCS | Performed by: STUDENT IN AN ORGANIZED HEALTH CARE EDUCATION/TRAINING PROGRAM

## 2023-09-15 PROCEDURE — 1123F ACP DISCUSS/DSCN MKR DOCD: CPT | Performed by: STUDENT IN AN ORGANIZED HEALTH CARE EDUCATION/TRAINING PROGRAM

## 2023-09-15 PROCEDURE — 1090F PRES/ABSN URINE INCON ASSESS: CPT | Performed by: STUDENT IN AN ORGANIZED HEALTH CARE EDUCATION/TRAINING PROGRAM

## 2023-09-15 PROCEDURE — 3074F SYST BP LT 130 MM HG: CPT | Performed by: STUDENT IN AN ORGANIZED HEALTH CARE EDUCATION/TRAINING PROGRAM

## 2023-09-15 PROCEDURE — G8417 CALC BMI ABV UP PARAM F/U: HCPCS | Performed by: STUDENT IN AN ORGANIZED HEALTH CARE EDUCATION/TRAINING PROGRAM

## 2023-09-15 NOTE — PROGRESS NOTES
Automated exposure control, adjustment of the mA and or kV according  to patient size, iterative reconstruction. COMPARISON:  March 2017 and June 2015    FINDINGS:  LUNGS:  There is bronchiectasis, more in the bases. Interstitial lobular septal thickening. There is honeycombing at the bases. There is a basilar predominance. 1 dilated small bronchial in the right upper lobe. No airspace disease. AIRWAYS: Trachea and proximal bronchi grossly patent. PLEURA: No effusion or thickening or calcifications. LYMPH NODES: No enlarged axillary, hilar or mediastinal lymph nodes. HEART: Normal size. CORONARIES: Dense calcifications. UPPER ABDOMEN: Normal size adrenal glands. SKELETAL/CHEST WALL: DJD, otherwise no gross bony lesions. Impression  Lines as above consistent with UIP pattern. CT PE PROTOCOL: No results found for this or any previous visit from the past 365 days. LDCT SCREENING: No results found for this or any previous visit from the past 365 days. V/Q Scan:6/14/15  There are 2 matched perfusion ventilation defects in the left lung, one at the   posterior apex and the other at the base laterally. These findings are not   unexpected given the presence of the patient's enlarged heart plus chronic   postinflammatory scarring.  This yields a low probability for pulmonary embolism    AMBULATING OXIMETRY:   O2 sat HR   Room air at Rest % bpm   Room air ambulating % bpm   (recovery) Ambulating on Lpm % bpm     6MWT 6/9/21 10/20/22   9/15/23   distance 182 meters (51% predicted) 180m 273 m   Start;end O2 sat 94%, 88% 100%; 87% 3l 98%; 84%   Max VETO dyspnea 6 5 3   COMMENTS No O2 needed Required 3lpm  O2 sat recovered at rest to 100%        PFT: mild restriction, predominantly low diffusing capacity    7/12/16 10/17/2022   FVC 2.14L (57%) 2.0 (89%)   FEV1 1.71L (60%) 1.45 (89%)   FEV1/FVC 80% 0.72   TLC   3.51 (76%)   FRC       RV       DLCO 9.3mL(41%) 9.3 (48%)           No results found for this

## 2023-09-15 NOTE — PROGRESS NOTES
[address] [facility] and [address], [address] [place] [address]  T: [phone]  F: [phone]       6 MINUTE WALK TEST     Patient's Name Keturah Yañez   Age 80 y.o. Gender female   Height 61\"   Weight 155 lbs   Ordering Provider  ALLA COLORADO NP          Time Dyspnea  (Cristel Scale)  Fatigue  (Cristel Scale)  Blood Pressure Heart Rate Oxygen SAT RA or   w / O2? BASELINE 10:25  0 1 114 / 64 71 98 Room Air                                                                              POST TEST  10:31 3 4 124 / 74 69 84 Room Air     Medications taken before test are up to date:  Yes  Supplemental oxygen during the test: NO    Stopped or paused before 6 minutes? No  Other symptoms at end of exercise: None    Number of complete laps: 4 x 60 meters = 240 meters + final partial lap: 33 meters = 273 meters    Total distance walked in 6 minutes:  273  Meters  Predicted distance: 353.2 meters    Percent of predicted distance: 79 %                Tech comments: Good Effort    Test Performed by: Ladi Navas CMA      6mw calculator:  (DELETE AFTER USE)  [employer]/health/6-minute-walk-test

## 2023-09-19 ENCOUNTER — OFFICE VISIT (OUTPATIENT)
Age: 83
End: 2023-09-19
Payer: MEDICARE

## 2023-09-19 VITALS
BODY MASS INDEX: 29.38 KG/M2 | SYSTOLIC BLOOD PRESSURE: 130 MMHG | WEIGHT: 155.6 LBS | HEIGHT: 61 IN | HEART RATE: 68 BPM | DIASTOLIC BLOOD PRESSURE: 76 MMHG

## 2023-09-19 DIAGNOSIS — N18.31 STAGE 3A CHRONIC KIDNEY DISEASE (HCC): ICD-10-CM

## 2023-09-19 DIAGNOSIS — I42.0 DILATED CARDIOMYOPATHY (HCC): Primary | ICD-10-CM

## 2023-09-19 DIAGNOSIS — J96.11 CHRONIC RESPIRATORY FAILURE WITH HYPOXIA (HCC): ICD-10-CM

## 2023-09-19 DIAGNOSIS — E78.00 PURE HYPERCHOLESTEROLEMIA: ICD-10-CM

## 2023-09-19 DIAGNOSIS — I27.0 PRIMARY PULMONARY HYPERTENSION (HCC): ICD-10-CM

## 2023-09-19 DIAGNOSIS — I27.20 PULMONARY HYPERTENSION (HCC): ICD-10-CM

## 2023-09-19 DIAGNOSIS — I49.3 PVC (PREMATURE VENTRICULAR CONTRACTION): ICD-10-CM

## 2023-09-19 DIAGNOSIS — I50.32 DIASTOLIC CHF, CHRONIC (HCC): ICD-10-CM

## 2023-09-19 PROCEDURE — G8417 CALC BMI ABV UP PARAM F/U: HCPCS | Performed by: INTERNAL MEDICINE

## 2023-09-19 PROCEDURE — G8400 PT W/DXA NO RESULTS DOC: HCPCS | Performed by: INTERNAL MEDICINE

## 2023-09-19 PROCEDURE — 1123F ACP DISCUSS/DSCN MKR DOCD: CPT | Performed by: INTERNAL MEDICINE

## 2023-09-19 PROCEDURE — 3075F SYST BP GE 130 - 139MM HG: CPT | Performed by: INTERNAL MEDICINE

## 2023-09-19 PROCEDURE — 3078F DIAST BP <80 MM HG: CPT | Performed by: INTERNAL MEDICINE

## 2023-09-19 PROCEDURE — 1036F TOBACCO NON-USER: CPT | Performed by: INTERNAL MEDICINE

## 2023-09-19 PROCEDURE — G8428 CUR MEDS NOT DOCUMENT: HCPCS | Performed by: INTERNAL MEDICINE

## 2023-09-19 PROCEDURE — 1090F PRES/ABSN URINE INCON ASSESS: CPT | Performed by: INTERNAL MEDICINE

## 2023-09-19 PROCEDURE — 99214 OFFICE O/P EST MOD 30 MIN: CPT | Performed by: INTERNAL MEDICINE

## 2023-09-19 NOTE — PROGRESS NOTES
[address] [facility], [facility], [address]  PHONE: [phone]     23    NAME:  Everardo Henry  : 1940  MRN: [id number]       SUBJECTIVE:   Everardo Henry is a 80 y.o. female seen for a follow up visit regarding the following:     Chief Complaint   Patient presents with    Congestive Heart Failure       HPI:   Here for pHTN (Echo 6/15 RV sig enlarged, RVSP 110)   severe PVC burden in past (prior VT ablation , intolerant to amio, Ic agents)   prior ITP, pulm fibrosis      Off letaris d/t  increasing LFTs. LHC 3/9/18: mild CAD, normal EF     [facility] Echo 2019 [facility]: low normal EF, RVSP 51    2019 [facility] admission for UTI, C diff. Echo 2023:  EF 45-50%, mild MR, trace TR     Doing well now, exercising some now. NO CP, pressure. ROSA and edema ok. Lost 20 pds. Feeling well overall. Patient denies recent history of orthopnea, PND, excessive dizziness and/or syncope. , boyfriend of 36 yrs now. Past Medical History, Past Surgical History, Family history, Social History, and Medications were all reviewed with the patient today and updated as necessary.      Current Outpatient Medications   Medication Sig Dispense Refill    estradiol (ESTRACE) 0.5 MG tablet Take 1 tablet by mouth daily 90 tablet 1    OXYGEN Inhale into the lungs as needed 3 L      vitamin C (ASCORBIC ACID) 500 MG tablet Take 1 tablet by mouth daily      Multiple Vitamins-Minerals (THERAPEUTIC MULTIVITAMIN-MINERALS) tablet Take 1 tablet by mouth daily      furosemide (LASIX) 20 MG tablet Take 1 tablet by mouth daily 90 tablet 3    Tadalafil, PAH, 20 MG tablet Take 2 tablets by mouth daily 60 tablet 11    MILK THISTLE PO Take by mouth      Zinc Acetate, Oral, (ZINC ACETATE PO) Take by mouth daily      acetaminophen (TYLENOL) 500 MG tablet Take 2 tablets by mouth every 6 hours as needed      albuterol sulfate  (90 Base) MCG/ACT inhaler Inhale 1 puff into the lungs      Cholecalciferol 50 MCG

## 2023-09-28 DIAGNOSIS — I27.0 PRIMARY PULMONARY HYPERTENSION (HCC): ICD-10-CM

## 2023-09-28 NOTE — TELEPHONE ENCOUNTER
Patient needs new prescription CALLED in to the [address] [place] [address] doctor line it cannot be sent electronic . Doctor line to call prescription     # [phone]      Tadalafil, PAH, 20 MG tablet 60 tablet 11 11/15/2022     Sig - Route:  Take 2 tablets by mouth daily - Oral

## 2023-09-29 RX ORDER — TADALAFIL 20 MG/1
40 TABLET ORAL DAILY
Qty: 180 TABLET | Refills: 3 | Status: SHIPPED | OUTPATIENT
Start: 2023-09-29

## 2023-10-04 RX ORDER — TADALAFIL 20 MG/1
40 TABLET ORAL DAILY
Qty: 60 TABLET | Refills: 11 | OUTPATIENT
Start: 2023-10-04

## 2023-10-09 ENCOUNTER — NURSE ONLY (OUTPATIENT)
Dept: PULMONOLOGY | Age: 83
End: 2023-10-09
Payer: MEDICARE

## 2023-10-09 DIAGNOSIS — I27.0 PRIMARY PULMONARY HYPERTENSION (HCC): Primary | ICD-10-CM

## 2023-10-09 LAB
FEF25-27, POC: 1.48 L/S
FET, POC: NORMAL
FEV 1 , POC: 1.62 L
FEV1/FVC, POC: 76
FVC, POC: 2.12
LUNG AGE, POC: NORMAL
PEF, POC: 2.7 L/S

## 2023-10-09 PROCEDURE — 94726 PLETHYSMOGRAPHY LUNG VOLUMES: CPT | Performed by: INTERNAL MEDICINE

## 2023-10-09 PROCEDURE — 94010 BREATHING CAPACITY TEST: CPT | Performed by: INTERNAL MEDICINE

## 2023-10-09 PROCEDURE — 94729 DIFFUSING CAPACITY: CPT | Performed by: INTERNAL MEDICINE

## 2023-10-09 ASSESSMENT — PULMONARY FUNCTION TESTS
FVC_POC: 2.12
FEV1/FVC_POC: 76

## 2023-10-17 ENCOUNTER — TELEPHONE (OUTPATIENT)
Age: 83
End: 2023-10-17

## 2023-10-17 DIAGNOSIS — R00.2 PALPITATIONS: ICD-10-CM

## 2023-10-17 DIAGNOSIS — I49.3 PVC (PREMATURE VENTRICULAR CONTRACTION): Primary | ICD-10-CM

## 2023-10-17 NOTE — TELEPHONE ENCOUNTER
I spoke w/pt. she said that she has been having a weird sensation in her chest off/on. She said that when she gets the pain it feels almost like a \"contraction\"in her heart. It's hard to describe. She denies any SOB. It happens at rest and when doing things. She has an appt. in January. She was wondering if she may need a heart monitor?

## 2023-10-17 NOTE — TELEPHONE ENCOUNTER
Pt called to reschedule her January appt with Dr. Viki Cesar. Asked if she could be seen sooner, stated that she has been having CP, no SOB, states that she will get a pain that will last about 4-5 minutes. No current symptoms at this time. First time it happened was last Thursday/Friday. Pt says that it is not constant and happens here and there. Please call and advise.

## 2023-10-17 NOTE — TELEPHONE ENCOUNTER
Pt.notified of 's response and v/u. Appt.made tomorrow for monitor placement and fu appt. made for 11/15.

## 2023-11-22 RX ORDER — FUROSEMIDE 20 MG/1
20 TABLET ORAL DAILY
Qty: 90 TABLET | Refills: 3 | Status: SHIPPED | OUTPATIENT
Start: 2023-11-22

## 2023-11-28 ENCOUNTER — OFFICE VISIT (OUTPATIENT)
Age: 83
End: 2023-11-28
Payer: MEDICARE

## 2023-11-28 VITALS
DIASTOLIC BLOOD PRESSURE: 70 MMHG | WEIGHT: 155.4 LBS | BODY MASS INDEX: 29.34 KG/M2 | SYSTOLIC BLOOD PRESSURE: 122 MMHG | HEART RATE: 82 BPM | HEIGHT: 61 IN

## 2023-11-28 DIAGNOSIS — I27.0 PRIMARY PULMONARY HYPERTENSION (HCC): ICD-10-CM

## 2023-11-28 DIAGNOSIS — I27.20 PULMONARY HYPERTENSION (HCC): ICD-10-CM

## 2023-11-28 DIAGNOSIS — I10 PRIMARY HYPERTENSION: ICD-10-CM

## 2023-11-28 DIAGNOSIS — I42.0 DILATED CARDIOMYOPATHY (HCC): ICD-10-CM

## 2023-11-28 DIAGNOSIS — I50.32 DIASTOLIC CHF, CHRONIC (HCC): Primary | ICD-10-CM

## 2023-11-28 DIAGNOSIS — I49.3 PVC (PREMATURE VENTRICULAR CONTRACTION): ICD-10-CM

## 2023-11-28 PROCEDURE — 3074F SYST BP LT 130 MM HG: CPT | Performed by: INTERNAL MEDICINE

## 2023-11-28 PROCEDURE — G8484 FLU IMMUNIZE NO ADMIN: HCPCS | Performed by: INTERNAL MEDICINE

## 2023-11-28 PROCEDURE — 99214 OFFICE O/P EST MOD 30 MIN: CPT | Performed by: INTERNAL MEDICINE

## 2023-11-28 PROCEDURE — 1123F ACP DISCUSS/DSCN MKR DOCD: CPT | Performed by: INTERNAL MEDICINE

## 2023-11-28 PROCEDURE — 1036F TOBACCO NON-USER: CPT | Performed by: INTERNAL MEDICINE

## 2023-11-28 PROCEDURE — 1090F PRES/ABSN URINE INCON ASSESS: CPT | Performed by: INTERNAL MEDICINE

## 2023-11-28 PROCEDURE — G8428 CUR MEDS NOT DOCUMENT: HCPCS | Performed by: INTERNAL MEDICINE

## 2023-11-28 PROCEDURE — G8417 CALC BMI ABV UP PARAM F/U: HCPCS | Performed by: INTERNAL MEDICINE

## 2023-11-28 PROCEDURE — 93000 ELECTROCARDIOGRAM COMPLETE: CPT | Performed by: INTERNAL MEDICINE

## 2023-11-28 PROCEDURE — G8400 PT W/DXA NO RESULTS DOC: HCPCS | Performed by: INTERNAL MEDICINE

## 2023-11-28 PROCEDURE — 3078F DIAST BP <80 MM HG: CPT | Performed by: INTERNAL MEDICINE

## 2023-11-28 NOTE — PROGRESS NOTES
[address] [facility], [facility], [address]  PHONE: [phone]     23    NAME:  Channing Owen  : 1940  MRN: [id number]       SUBJECTIVE:   Channing Owen is a 80 y.o. female seen for a follow up visit regarding the following:     Chief Complaint   Patient presents with    Congestive Heart Failure       HPI:   Here for pHTN (Echo 6/15 RV sig enlarged, RVSP 110)   severe PVC burden in past (prior VT ablation , intolerant to amio, Ic agents)   prior ITP, pulm fibrosis      Off letaris d/t  increasing LFTs. LHC 3/9/18: mild CAD, normal EF     [facility] Echo 2019 [facility]: low normal EF, RVSP 51    2019 [facility] admission for UTI, C diff. Echo 2023:  EF 45-50%, mild MR, trace TR    Monitor 10/2023: Sinus, avg HR 75, no AFIB seen  A tach, short runs of NSVT. Doing well now, exercising some now. NO CP, pressure. ROSA and edema ok. Some rare neck pains, comes and goes. Some lack of energy. Feeling well overall. Patient denies recent history of orthopnea, PND, excessive dizziness and/or syncope. , boyfriend of 36 yrs now. Past Medical History, Past Surgical History, Family history, Social History, and Medications were all reviewed with the patient today and updated as necessary.      Current Outpatient Medications   Medication Sig Dispense Refill    furosemide (LASIX) 20 MG tablet Take 1 tablet by mouth once daily 90 tablet 3    Tadalafil, PAH, 20 MG tablet Take 2 tablets by mouth daily 180 tablet 3    estradiol (ESTRACE) 0.5 MG tablet Take 1 tablet by mouth daily 90 tablet 1    OXYGEN Inhale into the lungs as needed 3 L      vitamin C (ASCORBIC ACID) 500 MG tablet Take 1 tablet by mouth daily      Multiple Vitamins-Minerals (THERAPEUTIC MULTIVITAMIN-MINERALS) tablet Take 1 tablet by mouth daily      MILK THISTLE PO Take by mouth      Zinc Acetate, Oral, (ZINC ACETATE PO) Take by mouth daily      acetaminophen (TYLENOL) 500 MG tablet Take 2 tablets by

## 2023-12-06 ENCOUNTER — TELEPHONE (OUTPATIENT)
Dept: UROLOGY | Age: 83
End: 2023-12-06

## 2023-12-06 NOTE — TELEPHONE ENCOUNTER
Patient called, she is having gross blood in urine. Should she make an ov or do you want her to bring a specimen?

## 2023-12-07 ENCOUNTER — OFFICE VISIT (OUTPATIENT)
Dept: UROLOGY | Age: 83
End: 2023-12-07
Payer: COMMERCIAL

## 2023-12-07 DIAGNOSIS — R31.0 GROSS HEMATURIA: Primary | ICD-10-CM

## 2023-12-07 DIAGNOSIS — N39.0 RECURRENT UTI: ICD-10-CM

## 2023-12-07 DIAGNOSIS — R31.0 GROSS HEMATURIA: ICD-10-CM

## 2023-12-07 LAB
BILIRUBIN, URINE, POC: NEGATIVE
BLOOD URINE, POC: NORMAL
GLUCOSE URINE, POC: NEGATIVE
KETONES, URINE, POC: NEGATIVE
LEUKOCYTE ESTERASE, URINE, POC: NORMAL
NITRITE, URINE, POC: NEGATIVE
PH, URINE, POC: 6.5 (ref 4.6–8)
PROTEIN,URINE, POC: 30
SPECIFIC GRAVITY, URINE, POC: 1.02 (ref 1–1.03)
URINALYSIS CLARITY, POC: NORMAL
URINALYSIS COLOR, POC: NORMAL
UROBILINOGEN, POC: NORMAL

## 2023-12-07 PROCEDURE — 81003 URINALYSIS AUTO W/O SCOPE: CPT | Performed by: NURSE PRACTITIONER

## 2023-12-07 RX ORDER — TRIMETHOPRIM 100 MG/1
100 TABLET ORAL 2 TIMES DAILY
Qty: 14 TABLET | Refills: 0 | Status: SHIPPED | OUTPATIENT
Start: 2023-12-07 | End: 2023-12-14

## 2023-12-07 NOTE — PROGRESS NOTES
Reason for collection: Burning; Pressure  Patient #: [phone] or [phone]  Pharmacy: [facility] - Dipstick  Results for orders placed or performed in visit on 12/07/23   AMB POC URINALYSIS DIP STICK AUTO W/O MICRO   Result Value Ref Range    Color (UA POC)      Clarity (UA POC)      Glucose, Urine, POC Negative     Bilirubin, Urine, POC Negative     KETONES, Urine, POC Negative     Specific Gravity, Urine, POC 1.020 1.001 - 1.035    Blood (UA POC) Large     pH, Urine, POC 6.5 4.6 - 8.0    Protein, Urine, POC 30     Urobilinogen, POC 0.2 mg/dL     Nitrite, Urine, POC Negative     Leukocyte Esterase, Urine, POC Small    Results for orders placed or performed in visit on 02/09/23   AMB POC URINALYSIS DIP STICK AUTO W/O MICRO   Result Value Ref Range    Color, Urine, POC yellow     Clarity, Urine, POC clear     Glucose, Urine, POC Negative Negative    Bilirubin, Urine, POC Negative Negative    Ketones, Urine, POC Negative Negative    Specific Gravity, Urine, POC 1.025 1.001 - 1.035    Blood, Urine, POC negative Negative    pH, Urine, POC 7.0 4.6 - 8.0    Protein, Urine, POC Negative Negative    Urobilinogen, POC 0.2     Nitrite, Urine, POC negative Negative    Leukocyte Esterase, Urine, POC Large Negative         Requested Prescriptions      No prescriptions requested or ordered in this encounter     Plan    Diagnosis Orders   1. Gross hematuria  AMB POC URINALYSIS DIP STICK AUTO W/O MICRO        Urine infected. Trimethoprim sent.      AINSLEY Lew - CNP

## 2023-12-09 LAB
BACTERIA SPEC CULT: NORMAL
BACTERIA SPEC CULT: NORMAL
SERVICE CMNT-IMP: NORMAL

## 2023-12-14 ENCOUNTER — OFFICE VISIT (OUTPATIENT)
Dept: UROLOGY | Age: 83
End: 2023-12-14

## 2023-12-14 DIAGNOSIS — N39.0 RECURRENT UTI: Primary | ICD-10-CM

## 2023-12-14 DIAGNOSIS — N39.0 RECURRENT UTI: ICD-10-CM

## 2023-12-17 LAB
BACTERIA SPEC CULT: NORMAL
BACTERIA SPEC CULT: NORMAL
SERVICE CMNT-IMP: NORMAL

## 2024-01-26 ENCOUNTER — TELEPHONE (OUTPATIENT)
Dept: PULMONOLOGY | Age: 84
End: 2024-01-26

## 2024-01-26 NOTE — TELEPHONE ENCOUNTER
PA for Tadalafil sent to [facility] via CoverMyMeds. Key: [id number]    This request has been approved   PA Case: [id number], Status: Approved, Coverage Starts on: 1/1/2024 12:00:00 AM, Coverage Ends on: 12/31/2024 12:00:00 AM.

## 2024-02-19 DIAGNOSIS — N95.1 MENOPAUSE SYNDROME: ICD-10-CM

## 2024-02-19 RX ORDER — ESTRADIOL 0.5 MG/1
0.5 TABLET ORAL DAILY
Qty: 90 TABLET | Refills: 1 | OUTPATIENT
Start: 2024-02-19

## 2024-02-19 NOTE — TELEPHONE ENCOUNTER
Patient called for med refill but is due for visit. Appt made and patient will get refills at the visit.

## 2024-02-28 NOTE — PROGRESS NOTES
HPI  Betty Osorio is a 83 y.o. female seen for follow up menopause.  She wants to continue with her estrogen    Past Medical History, Past Surgical History, Family history, Social History, and Medications were all reviewed with the patient today and updated as necessary.         Allergies   Allergen Reactions    Esomeprazole Magnesium Hives and Rash    Dextromethorphan Rash     Patient told me this information on a call for medication reconciliation.    Amitriptyline Hives    Amlodipine Hives    Amoxicillin Hives and Other (See Comments)    Hydrochlorothiazide Hives    Ibuprofen Hives    Iodine Other (See Comments)    Levofloxacin Hives    Lisinopril Hives    Nebivolol Hives    Nitrofurantoin Hives and Other (See Comments)    Olmesartan Hives    Sulfa Antibiotics Hives    Cephalexin Hives and Rash    Flecainide Rash    Gabapentin Nausea And Vomiting    Lisinopril-Hydrochlorothiazide Rash    Nitrofurantoin Macrocrystal Rash    Tramadol Nausea And Vomiting     Past Medical History:   Diagnosis Date    Acute on chronic combined systolic and diastolic heart failure (HCC) 6/11/2015    Acute renal failure (ARF) ([facility]) 6/13/2015    Adiposity 11/10/2015    Arthritis 4/9/2014    Bilateral carotid artery stenosis     CAD (coronary artery disease) 4/9/2014    Moderate CAD 4/15     Chronic kidney disease     pt states she has one kidney    Coagulation disorder (HCC)     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    Congestive heart failure (CHF) ([facility])     diastolic HF    Diastolic CHF, chronic ([facility]) 11/30/2018    Dyspnea 6/11/2015    Dyspnea on exertion 4/9/2014    GERD (gastroesophageal reflux disease)     takes Tums    History of ITP     2012 in [place] for treatments-last one Nov '12    Hyperlipidemia 4/9/2014    Hypertension 4/9/2014    medications    Hyponatremia 6/11/2015    Hypothyroid     medication    Hypothyroidism 6/11/2015    Hypoxemia 7/16/2014    Left atrial dilation     Liver disease

## 2024-03-05 ENCOUNTER — OFFICE VISIT (OUTPATIENT)
Dept: OBGYN CLINIC | Age: 84
End: 2024-03-05
Payer: MEDICARE

## 2024-03-05 VITALS
HEIGHT: 61 IN | DIASTOLIC BLOOD PRESSURE: 80 MMHG | WEIGHT: 161 LBS | SYSTOLIC BLOOD PRESSURE: 142 MMHG | BODY MASS INDEX: 30.4 KG/M2

## 2024-03-05 DIAGNOSIS — N95.1 MENOPAUSE SYNDROME: Primary | ICD-10-CM

## 2024-03-05 DIAGNOSIS — Z12.31 SCREENING MAMMOGRAM, ENCOUNTER FOR: ICD-10-CM

## 2024-03-05 PROCEDURE — 3079F DIAST BP 80-89 MM HG: CPT | Performed by: OBSTETRICS & GYNECOLOGY

## 2024-03-05 PROCEDURE — G8427 DOCREV CUR MEDS BY ELIG CLIN: HCPCS | Performed by: OBSTETRICS & GYNECOLOGY

## 2024-03-05 PROCEDURE — 99214 OFFICE O/P EST MOD 30 MIN: CPT | Performed by: OBSTETRICS & GYNECOLOGY

## 2024-03-05 PROCEDURE — 1036F TOBACCO NON-USER: CPT | Performed by: OBSTETRICS & GYNECOLOGY

## 2024-03-05 PROCEDURE — 1123F ACP DISCUSS/DSCN MKR DOCD: CPT | Performed by: OBSTETRICS & GYNECOLOGY

## 2024-03-05 PROCEDURE — 1090F PRES/ABSN URINE INCON ASSESS: CPT | Performed by: OBSTETRICS & GYNECOLOGY

## 2024-03-05 PROCEDURE — G8417 CALC BMI ABV UP PARAM F/U: HCPCS | Performed by: OBSTETRICS & GYNECOLOGY

## 2024-03-05 PROCEDURE — 3077F SYST BP >= 140 MM HG: CPT | Performed by: OBSTETRICS & GYNECOLOGY

## 2024-03-05 PROCEDURE — G8484 FLU IMMUNIZE NO ADMIN: HCPCS | Performed by: OBSTETRICS & GYNECOLOGY

## 2024-03-05 PROCEDURE — G8400 PT W/DXA NO RESULTS DOC: HCPCS | Performed by: OBSTETRICS & GYNECOLOGY

## 2024-03-05 RX ORDER — ESTRADIOL 0.5 MG/1
0.5 TABLET ORAL DAILY
Qty: 90 TABLET | Refills: 4 | Status: SHIPPED | OUTPATIENT
Start: 2024-03-05

## 2024-03-25 ENCOUNTER — OFFICE VISIT (OUTPATIENT)
Dept: PULMONOLOGY | Age: 84
End: 2024-03-25
Payer: MEDICARE

## 2024-03-25 VITALS
OXYGEN SATURATION: 95 % | BODY MASS INDEX: 29.45 KG/M2 | HEART RATE: 85 BPM | SYSTOLIC BLOOD PRESSURE: 120 MMHG | RESPIRATION RATE: 20 BRPM | DIASTOLIC BLOOD PRESSURE: 80 MMHG | TEMPERATURE: 98 F | WEIGHT: 156 LBS | HEIGHT: 61 IN

## 2024-03-25 DIAGNOSIS — I27.20 PULMONARY HYPERTENSION (HCC): Primary | ICD-10-CM

## 2024-03-25 DIAGNOSIS — J84.9 ILD (INTERSTITIAL LUNG DISEASE) (HCC): ICD-10-CM

## 2024-03-25 DIAGNOSIS — R76.8 POSITIVE ANA (ANTINUCLEAR ANTIBODY): ICD-10-CM

## 2024-03-25 DIAGNOSIS — R06.09 DYSPNEA ON EXERTION: ICD-10-CM

## 2024-03-25 PROBLEM — J18.9 PNEUMONIA: Status: RESOLVED | Noted: 2018-07-23 | Resolved: 2024-03-25

## 2024-03-25 PROBLEM — J96.01 ACUTE RESPIRATORY FAILURE WITH HYPOXIA (HCC): Status: RESOLVED | Noted: 2023-07-28 | Resolved: 2024-03-25

## 2024-03-25 PROBLEM — R55 POSTURAL DIZZINESS WITH PRESYNCOPE: Status: RESOLVED | Noted: 2019-07-16 | Resolved: 2024-03-25

## 2024-03-25 PROBLEM — J18.9 LEFT LOWER LOBE PNEUMONIA: Status: RESOLVED | Noted: 2018-07-23 | Resolved: 2024-03-25

## 2024-03-25 PROBLEM — R42 POSTURAL DIZZINESS WITH PRESYNCOPE: Status: RESOLVED | Noted: 2019-07-16 | Resolved: 2024-03-25

## 2024-03-25 PROCEDURE — G8417 CALC BMI ABV UP PARAM F/U: HCPCS | Performed by: STUDENT IN AN ORGANIZED HEALTH CARE EDUCATION/TRAINING PROGRAM

## 2024-03-25 PROCEDURE — G8427 DOCREV CUR MEDS BY ELIG CLIN: HCPCS | Performed by: STUDENT IN AN ORGANIZED HEALTH CARE EDUCATION/TRAINING PROGRAM

## 2024-03-25 PROCEDURE — 99214 OFFICE O/P EST MOD 30 MIN: CPT | Performed by: STUDENT IN AN ORGANIZED HEALTH CARE EDUCATION/TRAINING PROGRAM

## 2024-03-25 PROCEDURE — G8400 PT W/DXA NO RESULTS DOC: HCPCS | Performed by: STUDENT IN AN ORGANIZED HEALTH CARE EDUCATION/TRAINING PROGRAM

## 2024-03-25 PROCEDURE — 3074F SYST BP LT 130 MM HG: CPT | Performed by: STUDENT IN AN ORGANIZED HEALTH CARE EDUCATION/TRAINING PROGRAM

## 2024-03-25 PROCEDURE — 3079F DIAST BP 80-89 MM HG: CPT | Performed by: STUDENT IN AN ORGANIZED HEALTH CARE EDUCATION/TRAINING PROGRAM

## 2024-03-25 PROCEDURE — 1123F ACP DISCUSS/DSCN MKR DOCD: CPT | Performed by: STUDENT IN AN ORGANIZED HEALTH CARE EDUCATION/TRAINING PROGRAM

## 2024-03-25 PROCEDURE — 1036F TOBACCO NON-USER: CPT | Performed by: STUDENT IN AN ORGANIZED HEALTH CARE EDUCATION/TRAINING PROGRAM

## 2024-03-25 PROCEDURE — G8484 FLU IMMUNIZE NO ADMIN: HCPCS | Performed by: STUDENT IN AN ORGANIZED HEALTH CARE EDUCATION/TRAINING PROGRAM

## 2024-03-25 PROCEDURE — 1090F PRES/ABSN URINE INCON ASSESS: CPT | Performed by: STUDENT IN AN ORGANIZED HEALTH CARE EDUCATION/TRAINING PROGRAM

## 2024-03-25 RX ORDER — ALBUTEROL SULFATE 90 UG/1
2 AEROSOL, METERED RESPIRATORY (INHALATION) EVERY 6 HOURS PRN
Qty: 1 EACH | Refills: 11 | Status: CANCELLED | OUTPATIENT
Start: 2024-03-25

## 2024-03-25 NOTE — PROGRESS NOTES
Patient Name:  Betty Osorio                               YOB: 1940  MRN: [id number]                                                Office Visit 3/25/2024    ASSESSMENT AND PLAN:  (Medical Decision Making)    1. Pulmonary hypertension (HCC)  Doing well on current treatment of 40 mg daily Tadalafil, 20 mg lasix daily, and 25 mg spironalactone daily. She completed pulmonary rehab. Now reporting functional class I symptoms and is a low REVEAL lite risk score. Not requiring supplemental O2.   Will recheck 6MWT in 6 months.     2. ILD (interstitial lung disease) (HCC)  PFTs stable and even improved in some parameters. Has very minimal SOB but does have an occasional dry cough that is not bothersome to her. Declined rheumatology referral.     3. 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     4. Dyspnea on exertion  Functional class I symptoms now and is doing excellent on current PH therapy. PFTs showed improvement in FEV1. TLC was decreased by 16% and DLCO was stable.     No orders of the defined types were placed in this encounter.    No orders of the defined types were placed in this encounter.    Follow-up and Dispositions    Return in about 6 months (around 9/25/2024) for Marcia or Dr. Johnson, pulmonary hypertension; 6MWT.       AINSLEY Diallo - NP    Collaborating physician is Kenny Light MD    _______________________________________________________      REASON FOR VISIT:   Chief Complaint   Patient presents with    Pulmonary hypertension       HISTORY OF PRESENT ILLNESS:    Ms. Betty Osorio is a 83 y.o. female with a PMH significant for Group 1 pulmonary hypertension with RVSP as high as 110 mmHg in 2015, Raynaud's, positive CRUZ, ITP, PVCs, coronary artery disease, obesitywho is seen at [facility] for  follow up of

## 2024-03-25 NOTE — PATIENT INSTRUCTIONS
Your pulmonary hypertension team here at [facility] are Maida Johnson MD, Marcia Mcclure DNP, AGACNP, and Vonda IVY CMA. We will work together to care for you. You can reach us at [phone] or through MyChart messaging.     - It is time to do a new 6 minute walk test and we will check this next time.   - continue tadalafil 40mg daily along with Furosemide 20mg daily and Spironolactone 25mg daily  - continue to monitor your weight daily.   - You are doing a great job!

## 2024-03-28 ENCOUNTER — OFFICE VISIT (OUTPATIENT)
Age: 84
End: 2024-03-28
Payer: MEDICARE

## 2024-03-28 VITALS
HEART RATE: 84 BPM | SYSTOLIC BLOOD PRESSURE: 128 MMHG | DIASTOLIC BLOOD PRESSURE: 88 MMHG | BODY MASS INDEX: 29.57 KG/M2 | WEIGHT: 156.6 LBS | HEIGHT: 61 IN

## 2024-03-28 DIAGNOSIS — J96.11 CHRONIC RESPIRATORY FAILURE WITH HYPOXIA (HCC): ICD-10-CM

## 2024-03-28 DIAGNOSIS — I49.3 PVC (PREMATURE VENTRICULAR CONTRACTION): ICD-10-CM

## 2024-03-28 DIAGNOSIS — I42.0 DILATED CARDIOMYOPATHY (HCC): Primary | ICD-10-CM

## 2024-03-28 DIAGNOSIS — J84.10 PULMONARY FIBROSIS (HCC): ICD-10-CM

## 2024-03-28 DIAGNOSIS — I50.32 DIASTOLIC CHF, CHRONIC (HCC): ICD-10-CM

## 2024-03-28 DIAGNOSIS — I10 PRIMARY HYPERTENSION: ICD-10-CM

## 2024-03-28 DIAGNOSIS — I27.0 PRIMARY PULMONARY HYPERTENSION (HCC): ICD-10-CM

## 2024-03-28 PROCEDURE — 3074F SYST BP LT 130 MM HG: CPT | Performed by: INTERNAL MEDICINE

## 2024-03-28 PROCEDURE — G8417 CALC BMI ABV UP PARAM F/U: HCPCS | Performed by: INTERNAL MEDICINE

## 2024-03-28 PROCEDURE — G8428 CUR MEDS NOT DOCUMENT: HCPCS | Performed by: INTERNAL MEDICINE

## 2024-03-28 PROCEDURE — 1090F PRES/ABSN URINE INCON ASSESS: CPT | Performed by: INTERNAL MEDICINE

## 2024-03-28 PROCEDURE — 1123F ACP DISCUSS/DSCN MKR DOCD: CPT | Performed by: INTERNAL MEDICINE

## 2024-03-28 PROCEDURE — 1036F TOBACCO NON-USER: CPT | Performed by: INTERNAL MEDICINE

## 2024-03-28 PROCEDURE — 3079F DIAST BP 80-89 MM HG: CPT | Performed by: INTERNAL MEDICINE

## 2024-03-28 PROCEDURE — 99214 OFFICE O/P EST MOD 30 MIN: CPT | Performed by: INTERNAL MEDICINE

## 2024-03-28 PROCEDURE — G8400 PT W/DXA NO RESULTS DOC: HCPCS | Performed by: INTERNAL MEDICINE

## 2024-03-28 PROCEDURE — G8484 FLU IMMUNIZE NO ADMIN: HCPCS | Performed by: INTERNAL MEDICINE

## 2024-03-28 NOTE — PROGRESS NOTES
questions were answered with the patient voicing understanding.    Sun labs 12/28/2023: K normal, Cr 1, LDL 88          2. CM/pHTN:  on lasix and aldactone, she follows daily weights.   Normal RV on echo.         3. CAD:   as above. EF low normal.  NO CP, no LHC needed.        4.  pHTN: walking and active.   Seeing Pulm as well.        5. ITP:  Released by Hematology.        6. PVC:  could not take BB or CCB, follow for now. Follow labs.   Monitor ok, short runs of asx NSVT.         Patient has been instructed and agrees to call our office with any issues or other concerns related to their cardiac condition(s) and/or complaint(s).        Return in about 6 months (around 9/28/2024).       Scott Adams, DO  3/28/2024

## 2024-05-23 ENCOUNTER — OFFICE VISIT (OUTPATIENT)
Dept: UROLOGY | Age: 84
End: 2024-05-23
Payer: MEDICARE

## 2024-05-23 DIAGNOSIS — N39.0 RECURRENT UTI: Primary | ICD-10-CM

## 2024-05-23 PROCEDURE — 81003 URINALYSIS AUTO W/O SCOPE: CPT | Performed by: NURSE PRACTITIONER

## 2024-05-23 NOTE — PROGRESS NOTES
UA - Dipstick  Results for orders placed or performed in visit on 05/23/24   AMB POC URINALYSIS DIP STICK AUTO W/O MICRO   Result Value Ref Range    Color (UA POC)      Clarity (UA POC)      Glucose, Urine, POC Negative     Bilirubin, Urine, POC Negative     KETONES, Urine, POC Negative     Specific Gravity, Urine, POC 1.010 1.001 - 1.035    Blood (UA POC) Trace-lysed     pH, Urine, POC 7.0 4.6 - 8.0    Protein, Urine, POC Negative     Urobilinogen, POC 0.2 mg/dL     Nitrite, Urine, POC Negative     Leukocyte Esterase, Urine, POC Large    Results for orders placed or performed in visit on 02/09/23   AMB POC URINALYSIS DIP STICK AUTO W/O MICRO   Result Value Ref Range    Color, Urine, POC yellow     Clarity, Urine, POC clear     Glucose, Urine, POC Negative Negative    Bilirubin, Urine, POC Negative Negative    Ketones, Urine, POC Negative Negative    Specific Gravity, Urine, POC 1.025 1.001 - 1.035    Blood, Urine, POC negative Negative    pH, Urine, POC 7.0 4.6 - 8.0    Protein, Urine, POC Negative Negative    Urobilinogen, POC 0.2     Nitrite, Urine, POC negative Negative    Leukocyte Esterase, Urine, POC Large Negative       UA - Micro  WBC - 0  RBC - 0  Bacteria - 0  Epith - 0      Requested Prescriptions      No prescriptions requested or ordered in this encounter     Plan    Diagnosis Orders   1. Recurrent UTI  AMB POC URINALYSIS DIP STICK AUTO W/O MICRO    Culture, Urine          Urine infected. Culture pending. Trimethoprim 100 mg PO BID x 7 days.    Viviana Lozano, APRN - CNP

## 2024-05-24 DIAGNOSIS — N39.0 RECURRENT UTI: ICD-10-CM

## 2024-05-28 LAB
BACTERIA SPEC CULT: ABNORMAL
BACTERIA SPEC CULT: ABNORMAL
SERVICE CMNT-IMP: ABNORMAL

## 2024-07-12 ENCOUNTER — OFFICE VISIT (OUTPATIENT)
Dept: ORTHOPEDIC SURGERY | Age: 84
End: 2024-07-12
Payer: MEDICARE

## 2024-07-12 DIAGNOSIS — M70.42 PREPATELLAR BURSITIS OF LEFT KNEE: ICD-10-CM

## 2024-07-12 DIAGNOSIS — M25.562 ACUTE PAIN OF LEFT KNEE: Primary | ICD-10-CM

## 2024-07-12 PROCEDURE — 1090F PRES/ABSN URINE INCON ASSESS: CPT | Performed by: ORTHOPAEDIC SURGERY

## 2024-07-12 PROCEDURE — 99203 OFFICE O/P NEW LOW 30 MIN: CPT | Performed by: ORTHOPAEDIC SURGERY

## 2024-07-12 PROCEDURE — G8428 CUR MEDS NOT DOCUMENT: HCPCS | Performed by: ORTHOPAEDIC SURGERY

## 2024-07-12 PROCEDURE — 1036F TOBACCO NON-USER: CPT | Performed by: ORTHOPAEDIC SURGERY

## 2024-07-12 PROCEDURE — 1123F ACP DISCUSS/DSCN MKR DOCD: CPT | Performed by: ORTHOPAEDIC SURGERY

## 2024-07-12 PROCEDURE — G8417 CALC BMI ABV UP PARAM F/U: HCPCS | Performed by: ORTHOPAEDIC SURGERY

## 2024-07-12 PROCEDURE — G8400 PT W/DXA NO RESULTS DOC: HCPCS | Performed by: ORTHOPAEDIC SURGERY

## 2024-07-12 NOTE — PROGRESS NOTES
Name: Betty Osorio  YOB: 1940  Gender: female  MRN: [id number]    CC: Left knee pain    HPI: Betty Osorio is a 83 y.o. female who presents with concerns related to her left knee.  She states she fell about 2 years ago on her left knee and did not think much of it but about a year later she noted small pebbly areas on the anterior aspect of her knee.  These annoy her when she tries to put direct pressure on the anterior aspect of her left knee.  She feels they have gotten a little bigger and wanted to have it evaluated.    She states that otherwise her knee does not bother her.  She is able to walk distances and get up and down and travel.  She is familiar with my practice as her fiancé is undergoing surgery under our care with good outcomes.    History was obtained from patient    ROS/Meds/PSH/PMH/FH/SH: I personally reviewed the patients standard intake form.  Below are the pertinents    Tobacco:  reports that she has never smoked. She has never used smokeless tobacco.  Past Medical History:   Diagnosis Date    Acute on chronic combined systolic and diastolic heart failure (HCC) 6/11/2015    Acute renal failure (ARF) ([facility]) 6/13/2015    Adiposity 11/10/2015    Arthritis 4/9/2014    Bilateral carotid artery stenosis     CAD (coronary artery disease) 4/9/2014    Moderate CAD 4/15     Chronic kidney disease     pt states she has one kidney    Coagulation disorder (HCC)     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    Congestive heart failure (CHF) ([facility])     diastolic HF    Diastolic CHF, chronic ([facility]) 11/30/2018    Dyspnea 6/11/2015    Dyspnea on exertion 4/9/2014    GERD (gastroesophageal reflux disease)     takes Tums    History of ITP     2012 in [place] for treatments-last one Nov '12    Hyperlipidemia 4/9/2014    Hypertension 4/9/2014    medications    Hyponatremia 6/11/2015    Hypothyroid     medication    Hypothyroidism 6/11/2015    Hypoxemia 7/16/2014    Left atrial

## 2024-08-17 DIAGNOSIS — I27.0 PRIMARY PULMONARY HYPERTENSION (HCC): ICD-10-CM

## 2024-08-19 RX ORDER — TADALAFIL 20 MG/1
2 TABLET ORAL DAILY
Qty: 60 TABLET | Refills: 5 | Status: SHIPPED | OUTPATIENT
Start: 2024-08-19

## 2024-08-20 ENCOUNTER — TELEPHONE (OUTPATIENT)
Age: 84
End: 2024-08-20

## 2024-08-20 NOTE — TELEPHONE ENCOUNTER
MEDICATION REFILL REQUEST      Name of Medication:  Levothyroxine   Dose:  100 mg  Frequency:    Quantity:    Days' supply:        Pharmacy Name/Location:  [facility]/ If a problem calling in, please call patient

## 2024-08-20 NOTE — TELEPHONE ENCOUNTER
Called pt advised Rx requested would need to be filled by PCP since it is not a cardiac medication. Pt states will call PCP to get med refilled

## 2024-08-21 DIAGNOSIS — I27.0 PRIMARY PULMONARY HYPERTENSION (HCC): ICD-10-CM

## 2024-08-22 RX ORDER — TADALAFIL 20 MG/1
2 TABLET ORAL DAILY
Qty: 60 TABLET | Refills: 5 | OUTPATIENT
Start: 2024-08-22

## 2024-09-04 ENCOUNTER — OFFICE VISIT (OUTPATIENT)
Age: 84
End: 2024-09-04
Payer: MEDICARE

## 2024-09-04 VITALS
SYSTOLIC BLOOD PRESSURE: 118 MMHG | HEART RATE: 76 BPM | HEIGHT: 61 IN | BODY MASS INDEX: 29.27 KG/M2 | WEIGHT: 155 LBS | DIASTOLIC BLOOD PRESSURE: 74 MMHG

## 2024-09-04 DIAGNOSIS — I27.0 PRIMARY PULMONARY HYPERTENSION (HCC): ICD-10-CM

## 2024-09-04 DIAGNOSIS — I42.0 DILATED CARDIOMYOPATHY (HCC): ICD-10-CM

## 2024-09-04 DIAGNOSIS — I50.32 DIASTOLIC CHF, CHRONIC (HCC): ICD-10-CM

## 2024-09-04 DIAGNOSIS — J96.11 CHRONIC RESPIRATORY FAILURE WITH HYPOXIA (HCC): ICD-10-CM

## 2024-09-04 DIAGNOSIS — I10 PRIMARY HYPERTENSION: ICD-10-CM

## 2024-09-04 DIAGNOSIS — I49.3 PVC (PREMATURE VENTRICULAR CONTRACTION): Primary | ICD-10-CM

## 2024-09-04 DIAGNOSIS — J84.10 PULMONARY FIBROSIS (HCC): ICD-10-CM

## 2024-09-04 LAB
25(OH)D3 SERPL-MCNC: 76.9 NG/ML (ref 30–100)
ALBUMIN SERPL-MCNC: 3.8 G/DL (ref 3.2–4.6)
ALBUMIN/GLOB SERPL: 1 (ref 1–1.9)
ALP SERPL-CCNC: 66 U/L (ref 35–104)
ALT SERPL-CCNC: 13 U/L (ref 12–65)
ANION GAP SERPL CALC-SCNC: 11 MMOL/L (ref 9–18)
AST SERPL-CCNC: 26 U/L (ref 15–37)
BILIRUB SERPL-MCNC: 0.5 MG/DL (ref 0–1.2)
BUN SERPL-MCNC: 19 MG/DL (ref 8–23)
CALCIUM SERPL-MCNC: 10 MG/DL (ref 8.8–10.2)
CHLORIDE SERPL-SCNC: 94 MMOL/L (ref 98–107)
CHOLEST SERPL-MCNC: 191 MG/DL (ref 0–200)
CO2 SERPL-SCNC: 27 MMOL/L (ref 20–28)
CREAT SERPL-MCNC: 1.07 MG/DL (ref 0.6–1.1)
ERYTHROCYTE [DISTWIDTH] IN BLOOD BY AUTOMATED COUNT: 14.8 % (ref 11.9–14.6)
GLOBULIN SER CALC-MCNC: 3.6 G/DL (ref 2.3–3.5)
GLUCOSE SERPL-MCNC: 89 MG/DL (ref 70–99)
HCT VFR BLD AUTO: 43 % (ref 35.8–46.3)
HDLC SERPL-MCNC: 62 MG/DL (ref 40–60)
HDLC SERPL: 3.1 (ref 0–5)
HGB BLD-MCNC: 13.8 G/DL (ref 11.7–15.4)
LDLC SERPL CALC-MCNC: 111 MG/DL (ref 0–100)
MAGNESIUM SERPL-MCNC: 1.8 MG/DL (ref 1.8–2.4)
MCH RBC QN AUTO: 30.9 PG (ref 26.1–32.9)
MCHC RBC AUTO-ENTMCNC: 32.1 G/DL (ref 31.4–35)
MCV RBC AUTO: 96.2 FL (ref 82–102)
NRBC # BLD: 0 K/UL (ref 0–0.2)
NT PRO BNP: 716 PG/ML (ref 0–450)
PLATELET # BLD AUTO: 257 K/UL (ref 150–450)
PMV BLD AUTO: 10.6 FL (ref 9.4–12.3)
POTASSIUM SERPL-SCNC: 4.7 MMOL/L (ref 3.5–5.1)
PROT SERPL-MCNC: 7.4 G/DL (ref 6.3–8.2)
RBC # BLD AUTO: 4.47 M/UL (ref 4.05–5.2)
SODIUM SERPL-SCNC: 131 MMOL/L (ref 136–145)
TRIGL SERPL-MCNC: 90 MG/DL (ref 0–150)
TSH, 3RD GENERATION: 2.82 UIU/ML (ref 0.27–4.2)
VLDLC SERPL CALC-MCNC: 18 MG/DL (ref 6–23)
WBC # BLD AUTO: 9.4 K/UL (ref 4.3–11.1)

## 2024-09-04 PROCEDURE — G8428 CUR MEDS NOT DOCUMENT: HCPCS | Performed by: INTERNAL MEDICINE

## 2024-09-04 PROCEDURE — 1036F TOBACCO NON-USER: CPT | Performed by: INTERNAL MEDICINE

## 2024-09-04 PROCEDURE — 1090F PRES/ABSN URINE INCON ASSESS: CPT | Performed by: INTERNAL MEDICINE

## 2024-09-04 PROCEDURE — 3074F SYST BP LT 130 MM HG: CPT | Performed by: INTERNAL MEDICINE

## 2024-09-04 PROCEDURE — G8417 CALC BMI ABV UP PARAM F/U: HCPCS | Performed by: INTERNAL MEDICINE

## 2024-09-04 PROCEDURE — 3078F DIAST BP <80 MM HG: CPT | Performed by: INTERNAL MEDICINE

## 2024-09-04 PROCEDURE — G8400 PT W/DXA NO RESULTS DOC: HCPCS | Performed by: INTERNAL MEDICINE

## 2024-09-04 PROCEDURE — 1123F ACP DISCUSS/DSCN MKR DOCD: CPT | Performed by: INTERNAL MEDICINE

## 2024-09-04 PROCEDURE — 99214 OFFICE O/P EST MOD 30 MIN: CPT | Performed by: INTERNAL MEDICINE

## 2024-09-04 NOTE — PROGRESS NOTES
[address] [facility], [place] [address]  PHONE: [phone]     24    NAME:  Betty Osorio  : 1940  MRN: [id number]       SUBJECTIVE:   Betty Osorio is a 84 y.o. female seen for a follow up visit regarding the following:     Chief Complaint   Patient presents with    Congestive Heart Failure       HPI:   Here for pHTN (Echo 6/15 RV sig enlarged, RVSP 110)   severe PVC burden in past (prior VT ablation , intolerant to amio, Ic agents)   prior ITP, pulm fibrosis      Off letaris d/t  increasing LFTs.   [facility] 3/9/18: mild CAD, normal EF    Echo 2023:  EF 45-50%, mild MR, trace TR     Monitor 10/2023: Sinus, avg HR 75, no AFIB seen  A tach, short runs of NSVT.      Doing well now, exercising some now, walking more and busy.  NO CP, pressure.  ROSA and edema ok.   Doing well on lasix and aldactone, edema ok.  Feeling well.    NO falling, not using walker.       Patient denies recent history of orthopnea, PND, excessive dizziness and/or syncope.     , boyfriend of 40 yrs now.            Past Medical History, Past Surgical History, Family history, Social History, and Medications were all reviewed with the patient today and updated as necessary.     Current Outpatient Medications   Medication Sig Dispense Refill    Tadalafil, PAH, 20 MG tablet TAKE 2 TABLETS BY MOUTH ONCE DAILY 60 tablet 5    estradiol (ESTRACE) 0.5 MG tablet Take 1 tablet by mouth daily 90 tablet 4    furosemide (LASIX) 20 MG tablet Take 1 tablet by mouth once daily 90 tablet 3    OXYGEN Inhale into the lungs as needed 3 L      vitamin C (ASCORBIC ACID) 500 MG tablet Take 1 tablet by mouth daily      Multiple Vitamins-Minerals (THERAPEUTIC MULTIVITAMIN-MINERALS) tablet Take 1 tablet by mouth daily      MILK THISTLE PO Take by mouth      Zinc Acetate, Oral, (ZINC ACETATE PO) Take by mouth daily      acetaminophen (TYLENOL) 500 MG tablet Take 2 tablets by mouth every 6 hours as needed      albuterol sulfate

## 2024-09-25 ENCOUNTER — OFFICE VISIT (OUTPATIENT)
Dept: PULMONOLOGY | Age: 84
End: 2024-09-25
Payer: MEDICARE

## 2024-09-25 ENCOUNTER — NURSE ONLY (OUTPATIENT)
Dept: PULMONOLOGY | Age: 84
End: 2024-09-25
Payer: MEDICARE

## 2024-09-25 VITALS
RESPIRATION RATE: 17 BRPM | BODY MASS INDEX: 29.27 KG/M2 | SYSTOLIC BLOOD PRESSURE: 130 MMHG | OXYGEN SATURATION: 94 % | TEMPERATURE: 97.2 F | WEIGHT: 155 LBS | HEIGHT: 61 IN | DIASTOLIC BLOOD PRESSURE: 80 MMHG | HEART RATE: 75 BPM

## 2024-09-25 DIAGNOSIS — R06.09 DYSPNEA ON EXERTION: Primary | ICD-10-CM

## 2024-09-25 DIAGNOSIS — I27.20 PULMONARY HYPERTENSION (HCC): Primary | ICD-10-CM

## 2024-09-25 DIAGNOSIS — J84.9 INTERSTITIAL LUNG DISEASE (HCC): ICD-10-CM

## 2024-09-25 DIAGNOSIS — R76.8 POSITIVE ANA (ANTINUCLEAR ANTIBODY): ICD-10-CM

## 2024-09-25 DIAGNOSIS — I27.20 PULMONARY HYPERTENSION (HCC): ICD-10-CM

## 2024-09-25 PROCEDURE — 3075F SYST BP GE 130 - 139MM HG: CPT | Performed by: INTERNAL MEDICINE

## 2024-09-25 PROCEDURE — 99215 OFFICE O/P EST HI 40 MIN: CPT | Performed by: INTERNAL MEDICINE

## 2024-09-25 PROCEDURE — G8427 DOCREV CUR MEDS BY ELIG CLIN: HCPCS | Performed by: INTERNAL MEDICINE

## 2024-09-25 PROCEDURE — 1036F TOBACCO NON-USER: CPT | Performed by: INTERNAL MEDICINE

## 2024-09-25 PROCEDURE — 3079F DIAST BP 80-89 MM HG: CPT | Performed by: INTERNAL MEDICINE

## 2024-09-25 PROCEDURE — G8400 PT W/DXA NO RESULTS DOC: HCPCS | Performed by: INTERNAL MEDICINE

## 2024-09-25 PROCEDURE — 1090F PRES/ABSN URINE INCON ASSESS: CPT | Performed by: INTERNAL MEDICINE

## 2024-09-25 PROCEDURE — 1123F ACP DISCUSS/DSCN MKR DOCD: CPT | Performed by: INTERNAL MEDICINE

## 2024-09-25 PROCEDURE — G8417 CALC BMI ABV UP PARAM F/U: HCPCS | Performed by: INTERNAL MEDICINE

## 2024-09-25 PROCEDURE — 94618 PULMONARY STRESS TESTING: CPT | Performed by: INTERNAL MEDICINE

## 2024-09-27 LAB
CENTROMERE B AB SER-ACNC: <0.2 AI (ref 0–0.9)
CHROMATIN AB SERPL-ACNC: <0.2 AI (ref 0–0.9)
DSDNA AB SER-ACNC: <1 IU/ML (ref 0–9)
ENA JO1 AB SER-ACNC: <0.2 AI (ref 0–0.9)
ENA RNP AB SER-ACNC: 2.6 AI (ref 0–0.9)
ENA SCL70 AB SER-ACNC: <0.2 AI (ref 0–0.9)
ENA SM AB SER-ACNC: <0.2 AI (ref 0–0.9)
ENA SS-A AB SER-ACNC: 6 AI (ref 0–0.9)
ENA SS-B AB SER-ACNC: <0.2 AI (ref 0–0.9)
Lab: ABNORMAL

## 2024-11-11 RX ORDER — FUROSEMIDE 20 MG/1
20 TABLET ORAL DAILY
Qty: 90 TABLET | Refills: 3 | Status: SHIPPED | OUTPATIENT
Start: 2024-11-11

## 2025-01-28 DIAGNOSIS — I27.0 PRIMARY PULMONARY HYPERTENSION (HCC): ICD-10-CM

## 2025-01-29 RX ORDER — TADALAFIL 20 MG/1
2 TABLET ORAL DAILY
Qty: 60 TABLET | Refills: 5 | Status: ACTIVE | OUTPATIENT
Start: 2025-01-29

## 2025-02-13 ENCOUNTER — TELEMEDICINE (OUTPATIENT)
Dept: UROLOGY | Age: 85
End: 2025-02-13
Payer: MEDICARE

## 2025-02-13 DIAGNOSIS — R79.89 ELEVATED SERUM CREATININE: ICD-10-CM

## 2025-02-13 DIAGNOSIS — N39.0 RECURRENT UTI: Primary | ICD-10-CM

## 2025-02-13 PROCEDURE — 99214 OFFICE O/P EST MOD 30 MIN: CPT | Performed by: NURSE PRACTITIONER

## 2025-02-13 PROCEDURE — G8427 DOCREV CUR MEDS BY ELIG CLIN: HCPCS | Performed by: NURSE PRACTITIONER

## 2025-02-13 PROCEDURE — 1090F PRES/ABSN URINE INCON ASSESS: CPT | Performed by: NURSE PRACTITIONER

## 2025-02-13 PROCEDURE — G8400 PT W/DXA NO RESULTS DOC: HCPCS | Performed by: NURSE PRACTITIONER

## 2025-02-13 PROCEDURE — 1123F ACP DISCUSS/DSCN MKR DOCD: CPT | Performed by: NURSE PRACTITIONER

## 2025-02-13 PROCEDURE — 1160F RVW MEDS BY RX/DR IN RCRD: CPT | Performed by: NURSE PRACTITIONER

## 2025-02-13 PROCEDURE — 1159F MED LIST DOCD IN RCRD: CPT | Performed by: NURSE PRACTITIONER

## 2025-02-13 NOTE — PROGRESS NOTES
daily  Scott Adams DO   estradiol (ESTRACE) 0.5 MG tablet Take 1 tablet by mouth daily  Dale Wild MD   OXYGEN Inhale into the lungs as needed 3 L  Nina Crespo MD   vitamin C (ASCORBIC ACID) 500 MG tablet Take 1 tablet by mouth daily  Nina Crespo MD   Multiple Vitamins-Minerals (THERAPEUTIC MULTIVITAMIN-MINERALS) tablet Take 1 tablet by mouth daily  ProviderNina MD   MILK THISTLE PO Take by mouth  Automatic Reconciliation, Ar   Zinc Acetate, Oral, (ZINC ACETATE PO) Take by mouth daily  Automatic Reconciliation, Ar   acetaminophen (TYLENOL) 500 MG tablet Take 2 tablets by mouth every 6 hours as needed  Automatic Reconciliation, Ar   albuterol sulfate  (90 Base) MCG/ACT inhaler Inhale 1 puff into the lungs  Automatic Reconciliation, Ar   Cholecalciferol 50 MCG (2000 UT) TABS Take 1 tablet by mouth daily  Automatic Reconciliation, Ar   levothyroxine (SYNTHROID) 112 MCG tablet Take 1 tablet by mouth every morning (before breakfast)  Automatic Reconciliation, Ar   loperamide (IMODIUM) 2 MG capsule Take 1 capsule by mouth as needed  Automatic Reconciliation, Ar   magnesium oxide (MAG-OX) 400 MG tablet Take 1 tablet by mouth daily  Automatic Reconciliation, Ar   spironolactone (ALDACTONE) 25 MG tablet Take 0.5 tablets by mouth daily  Automatic Reconciliation, Ar       Social History     Tobacco Use    Smoking status: Never    Smokeless tobacco: Never    Tobacco comments:     I do not even like the smell of tobacco   Substance Use Topics    Alcohol use: Never    Drug use: Never      PHYSICAL EXAM    General appearance - well appearing and in no distress  Mental status - alert, oriented to person, place, and time  Neck - supple, no significant adenopathy  Chest/Lung-  Quiet, even and easy respiratory effort without use of accessory muscles      ASSESSMENT/PLAN:    Recurrent UTI- no recurrence of UTI. Remain OFF antibiotic. Trimethoprim 100 mg PO BID x 7 days. Advised to call

## 2025-03-17 NOTE — PROGRESS NOTES
hours based on effectiveness and tolerability. Initial: 5 mg as a single dose >=30 minutes prior to anticipated sexual activity; do not take more than once every 24 hours. Dose may be decreased to 2.5 mg administered no more frequently than once every 24 hours or increased to 10 mg administered no more frequently than once every 48 hours based on effectiveness and tolerability. 2.5 mg every 24 hours (not necessary to time with sexual activity); may increase to 5 mg every 24 hours based on effectiveness and tolerability. 10 mg every 24 hours Initial: 20 mg every 24 hours; may increase to 40 mg every 24 hours based on efficacy and tolerability.   CrCl <30 mL/minute Avoid use due to increased tadalafil exposure; limited clinical experience. 5 mg as a single dose >=30 minutes prior to anticipated sexual activity; may repeat no more frequently than once every 72 hours. Avoid use due to increased tadalafil exposure; limited clinical experience. Avoid use due to increased tadalafil exposure; limited clinical experience. Avoid use due to increased tadalafil exposure; limited clinical experience.   a Dosing recommendations are based on expert opinion derived from [place] 2007; ’s labeling.   b Dosing recommendations are based on expert opinion; has not been studied in patients with high altitude pulmonary edema with kidney impairment.   c Dosing recommendations are based on expert opinion derived from Richard 2015; ACCP (Tanya 2019); ’s labeling.

## 2025-03-18 ENCOUNTER — TELEPHONE (OUTPATIENT)
Dept: PULMONOLOGY | Age: 85
End: 2025-03-18

## 2025-03-18 ENCOUNTER — OFFICE VISIT (OUTPATIENT)
Dept: PULMONOLOGY | Age: 85
End: 2025-03-18
Payer: MEDICARE

## 2025-03-18 VITALS
HEIGHT: 61 IN | OXYGEN SATURATION: 95 % | BODY MASS INDEX: 28.7 KG/M2 | RESPIRATION RATE: 19 BRPM | WEIGHT: 152 LBS | TEMPERATURE: 97.3 F | HEART RATE: 75 BPM | DIASTOLIC BLOOD PRESSURE: 66 MMHG | SYSTOLIC BLOOD PRESSURE: 126 MMHG

## 2025-03-18 DIAGNOSIS — I27.20 PULMONARY HYPERTENSION (HCC): Primary | ICD-10-CM

## 2025-03-18 DIAGNOSIS — R26.81 GAIT INSTABILITY: ICD-10-CM

## 2025-03-18 DIAGNOSIS — J84.9 ILD (INTERSTITIAL LUNG DISEASE) (HCC): ICD-10-CM

## 2025-03-18 PROCEDURE — 1123F ACP DISCUSS/DSCN MKR DOCD: CPT | Performed by: INTERNAL MEDICINE

## 2025-03-18 PROCEDURE — 99215 OFFICE O/P EST HI 40 MIN: CPT | Performed by: INTERNAL MEDICINE

## 2025-03-18 PROCEDURE — 3074F SYST BP LT 130 MM HG: CPT | Performed by: INTERNAL MEDICINE

## 2025-03-18 PROCEDURE — 1160F RVW MEDS BY RX/DR IN RCRD: CPT | Performed by: INTERNAL MEDICINE

## 2025-03-18 PROCEDURE — G8417 CALC BMI ABV UP PARAM F/U: HCPCS | Performed by: INTERNAL MEDICINE

## 2025-03-18 PROCEDURE — 1036F TOBACCO NON-USER: CPT | Performed by: INTERNAL MEDICINE

## 2025-03-18 PROCEDURE — 1159F MED LIST DOCD IN RCRD: CPT | Performed by: INTERNAL MEDICINE

## 2025-03-18 PROCEDURE — G8427 DOCREV CUR MEDS BY ELIG CLIN: HCPCS | Performed by: INTERNAL MEDICINE

## 2025-03-18 PROCEDURE — 3078F DIAST BP <80 MM HG: CPT | Performed by: INTERNAL MEDICINE

## 2025-03-18 PROCEDURE — 1090F PRES/ABSN URINE INCON ASSESS: CPT | Performed by: INTERNAL MEDICINE

## 2025-03-18 PROCEDURE — G8400 PT W/DXA NO RESULTS DOC: HCPCS | Performed by: INTERNAL MEDICINE

## 2025-03-18 RX ORDER — AMBRISENTAN 5 MG/1
5 TABLET, FILM COATED ORAL DAILY
Qty: 30 TABLET | Refills: 11 | Status: SHIPPED | OUTPATIENT
Start: 2025-03-18

## 2025-03-18 NOTE — PATIENT INSTRUCTIONS
Today we plan to start ambrisentan 5mg.    The most common side effect is ankle swelling, and this usually improves over the first several weeks.  Sometimes lasix is needed to manage this.  We discussed that tadalafil sometimes needs to be changed in setting of advance kidney disease, so we will monitor renal function closely with Dr. Farias.    Otherwise, we plan to update CT chest and lung function testing.  Also we will arrange an updated echo and 6 minute walk test

## 2025-03-19 ENCOUNTER — TELEPHONE (OUTPATIENT)
Dept: PULMONOLOGY | Age: 85
End: 2025-03-19

## 2025-03-19 ENCOUNTER — TELEPHONE (OUTPATIENT)
Dept: UROLOGY | Age: 85
End: 2025-03-19

## 2025-03-19 DIAGNOSIS — N39.0 RECURRENT UTI: Primary | ICD-10-CM

## 2025-03-19 LAB
APPEARANCE UR: ABNORMAL
BACTERIA URNS QL MICRO: ABNORMAL /HPF
BILIRUB UR QL: NEGATIVE
COLOR UR: ABNORMAL
EPI CELLS #/AREA URNS HPF: ABNORMAL /HPF
GLUCOSE UR STRIP.AUTO-MCNC: NEGATIVE MG/DL
HGB UR QL STRIP: ABNORMAL
KETONES UR QL STRIP.AUTO: NEGATIVE MG/DL
LEUKOCYTE ESTERASE UR QL STRIP.AUTO: ABNORMAL
NITRITE UR QL STRIP.AUTO: NEGATIVE
OTHER OBSERVATIONS: ABNORMAL
PH UR STRIP: 6 (ref 5–9)
PROT UR STRIP-MCNC: 100 MG/DL
RBC #/AREA URNS HPF: ABNORMAL /HPF
SP GR UR REFRACTOMETRY: 1.01 (ref 1–1.02)
UROBILINOGEN UR QL STRIP.AUTO: 0.2 EU/DL (ref 0.2–1)
WBC URNS QL MICRO: >100 /HPF

## 2025-03-19 NOTE — TELEPHONE ENCOUNTER
Dr. Johnson talk with patient yesterday . She has stage 3 kidney failure . She has a UTI. There are some meds she cannot have

## 2025-03-19 NOTE — TELEPHONE ENCOUNTER
Pt called to see what's the next stp after drpping urine off.  Let pt know uc will take at least 3 days for UC results.  Dr Nelson is pt Dr if results back before Friday.

## 2025-03-20 ENCOUNTER — RESULTS FOLLOW-UP (OUTPATIENT)
Dept: UROLOGY | Age: 85
End: 2025-03-20

## 2025-03-20 ENCOUNTER — OFFICE VISIT (OUTPATIENT)
Age: 85
End: 2025-03-20
Payer: MEDICARE

## 2025-03-20 VITALS
SYSTOLIC BLOOD PRESSURE: 148 MMHG | HEART RATE: 68 BPM | HEIGHT: 61 IN | DIASTOLIC BLOOD PRESSURE: 102 MMHG | WEIGHT: 152.5 LBS | BODY MASS INDEX: 28.79 KG/M2

## 2025-03-20 DIAGNOSIS — I50.32 DIASTOLIC CHF, CHRONIC (HCC): Primary | ICD-10-CM

## 2025-03-20 DIAGNOSIS — I49.3 PVC (PREMATURE VENTRICULAR CONTRACTION): ICD-10-CM

## 2025-03-20 DIAGNOSIS — J84.10 PULMONARY FIBROSIS (HCC): ICD-10-CM

## 2025-03-20 DIAGNOSIS — N18.31 STAGE 3A CHRONIC KIDNEY DISEASE (HCC): ICD-10-CM

## 2025-03-20 DIAGNOSIS — I42.0 DILATED CARDIOMYOPATHY (HCC): ICD-10-CM

## 2025-03-20 DIAGNOSIS — N39.0 RECURRENT UTI: Primary | ICD-10-CM

## 2025-03-20 DIAGNOSIS — I10 PRIMARY HYPERTENSION: ICD-10-CM

## 2025-03-20 DIAGNOSIS — E78.00 PURE HYPERCHOLESTEROLEMIA: ICD-10-CM

## 2025-03-20 DIAGNOSIS — I27.0 PRIMARY PULMONARY HYPERTENSION (HCC): ICD-10-CM

## 2025-03-20 DIAGNOSIS — J96.11 CHRONIC RESPIRATORY FAILURE WITH HYPOXIA: ICD-10-CM

## 2025-03-20 PROCEDURE — 99214 OFFICE O/P EST MOD 30 MIN: CPT | Performed by: INTERNAL MEDICINE

## 2025-03-20 PROCEDURE — 3080F DIAST BP >= 90 MM HG: CPT | Performed by: INTERNAL MEDICINE

## 2025-03-20 PROCEDURE — 1126F AMNT PAIN NOTED NONE PRSNT: CPT | Performed by: INTERNAL MEDICINE

## 2025-03-20 PROCEDURE — G8400 PT W/DXA NO RESULTS DOC: HCPCS | Performed by: INTERNAL MEDICINE

## 2025-03-20 PROCEDURE — G8428 CUR MEDS NOT DOCUMENT: HCPCS | Performed by: INTERNAL MEDICINE

## 2025-03-20 PROCEDURE — 1090F PRES/ABSN URINE INCON ASSESS: CPT | Performed by: INTERNAL MEDICINE

## 2025-03-20 PROCEDURE — 1036F TOBACCO NON-USER: CPT | Performed by: INTERNAL MEDICINE

## 2025-03-20 PROCEDURE — 93000 ELECTROCARDIOGRAM COMPLETE: CPT | Performed by: INTERNAL MEDICINE

## 2025-03-20 PROCEDURE — 1123F ACP DISCUSS/DSCN MKR DOCD: CPT | Performed by: INTERNAL MEDICINE

## 2025-03-20 PROCEDURE — 3077F SYST BP >= 140 MM HG: CPT | Performed by: INTERNAL MEDICINE

## 2025-03-20 PROCEDURE — G8417 CALC BMI ABV UP PARAM F/U: HCPCS | Performed by: INTERNAL MEDICINE

## 2025-03-20 NOTE — PROGRESS NOTES
[address] [facility], [place] [address]  PHONE: [phone]     25    NAME:  Betty Osorio  : 1940  MRN: [id number]       SUBJECTIVE:   Betty Osorio is a 84 y.o. female seen for a follow up visit regarding the following:     Chief Complaint   Patient presents with    Congestive Heart Failure       HPI:   Here for pHTN (Echo 6/15 RV sig enlarged, RVSP 110)   severe PVC burden in past (prior VT ablation , intolerant to amio, Ic agents)   prior ITP, pulm fibrosis   [facility] 3/9/18: mild CAD, normal EF    Echo 2023:  EF 45-50%, mild MR, trace TR     Monitor 10/2023: Sinus, avg HR 75, no AFIB seen  A tach, short runs of NSVT.      Dealing with UTIs now.  Seeing Nephrology now.  Seeing Pulmonary, seeing Dr. Johnson.    Off lasix with CKD, on aldactone 12.5 daily now.   NO CP, pressure.  ROSA and edema ok.   Balance more of an issue.    Home SBP 120s, better at home.        Patient denies recent history of orthopnea, PND, excessive dizziness and/or syncope.     , boyfriend of 40 yrs now.         Past Medical History, Past Surgical History, Family history, Social History, and Medications were all reviewed with the patient today and updated as necessary.     Current Outpatient Medications   Medication Sig Dispense Refill    Multiple Vitamins-Minerals (PRESERVISION AREDS PO) Take by mouth      ELDERBERRY PO Take by mouth      Tadalafil, PAH, 20 MG tablet TAKE 2 TABLETS BY MOUTH ONCE DAILY 60 tablet 5    estradiol (ESTRACE) 0.5 MG tablet Take 1 tablet by mouth daily 90 tablet 4    OXYGEN Inhale into the lungs as needed 3 L      vitamin C (ASCORBIC ACID) 500 MG tablet Take 1 tablet by mouth daily      Multiple Vitamins-Minerals (THERAPEUTIC MULTIVITAMIN-MINERALS) tablet Take 1 tablet by mouth daily      MILK THISTLE PO Take by mouth      Zinc Acetate, Oral, (ZINC ACETATE PO) Take by mouth daily      acetaminophen (TYLENOL) 500 MG tablet Take 2 tablets by mouth every 6 hours as

## 2025-03-20 NOTE — TELEPHONE ENCOUNTER
Contacted patient regarding message, she states that she was made aware of confirmed UTI, she is aware she allergic to a lot of antibiotics and the only one not on her allergy list is \"The-something\", but she has been advised she can no longer take that medication; she was told they could tell she has UTI but waiting on cultures to determine what type of treatment would be effective.  She relays that during conversation w/ Dr. Johnson they reviewed pain & burning w/ urination, she is trying to find out if we can do anything or see results. She is waiting for reply from urology for recommendations. Asking if our office can prescribe pyridium, but since Dr. Johnson is out of office she will wait for response from urology.

## 2025-03-21 LAB
BACTERIA SPEC CULT: ABNORMAL
SERVICE CMNT-IMP: ABNORMAL

## 2025-03-24 ENCOUNTER — CLINICAL SUPPORT (OUTPATIENT)
Dept: UROLOGY | Age: 85
End: 2025-03-24

## 2025-03-24 DIAGNOSIS — N39.0 RECURRENT UTI: Primary | ICD-10-CM

## 2025-03-24 RX ORDER — GENTAMICIN 40 MG/ML
40 INJECTION, SOLUTION INTRAMUSCULAR; INTRAVENOUS
Status: SHIPPED | OUTPATIENT
Start: 2025-03-24 | End: 2025-03-30

## 2025-03-24 RX ORDER — GENTAMICIN 40 MG/ML
80 INJECTION, SOLUTION INTRAMUSCULAR; INTRAVENOUS
Status: DISCONTINUED | OUTPATIENT
Start: 2025-03-24 | End: 2025-03-24

## 2025-03-24 RX ORDER — PHENAZOPYRIDINE HYDROCHLORIDE 200 MG/1
200 TABLET, FILM COATED ORAL 3 TIMES DAILY PRN
Qty: 9 TABLET | Refills: 0 | Status: SHIPPED | OUTPATIENT
Start: 2025-03-24 | End: 2025-03-27

## 2025-03-24 RX ORDER — PHENAZOPYRIDINE HYDROCHLORIDE 200 MG/1
200 TABLET, FILM COATED ORAL 3 TIMES DAILY PRN
Qty: 9 TABLET | Refills: 0 | Status: SHIPPED | OUTPATIENT
Start: 2025-03-24 | End: 2025-03-24 | Stop reason: ALTCHOICE

## 2025-03-24 RX ADMIN — GENTAMICIN 40 MG: 40 INJECTION, SOLUTION INTRAMUSCULAR; INTRAVENOUS at 13:24

## 2025-03-26 ENCOUNTER — OFFICE VISIT (OUTPATIENT)
Dept: UROLOGY | Age: 85
End: 2025-03-26

## 2025-03-26 DIAGNOSIS — N39.0 RECURRENT UTI: Primary | ICD-10-CM

## 2025-03-26 RX ORDER — GENTAMICIN 40 MG/ML
80 INJECTION, SOLUTION INTRAMUSCULAR; INTRAVENOUS ONCE
Status: SHIPPED | OUTPATIENT
Start: 2025-03-26

## 2025-03-26 RX ADMIN — GENTAMICIN 40 MG: 40 INJECTION, SOLUTION INTRAMUSCULAR; INTRAVENOUS at 08:59

## 2025-03-28 ENCOUNTER — OFFICE VISIT (OUTPATIENT)
Dept: UROLOGY | Age: 85
End: 2025-03-28

## 2025-03-28 DIAGNOSIS — N39.0 RECURRENT UTI: Primary | ICD-10-CM

## 2025-04-01 ENCOUNTER — CLINICAL SUPPORT (OUTPATIENT)
Dept: UROLOGY | Age: 85
End: 2025-04-01

## 2025-04-01 DIAGNOSIS — N39.0 RECURRENT UTI: Primary | ICD-10-CM

## 2025-04-01 DIAGNOSIS — N39.0 RECURRENT UTI: ICD-10-CM

## 2025-04-03 LAB
BACTERIA SPEC CULT: NORMAL
SERVICE CMNT-IMP: NORMAL

## 2025-04-04 ENCOUNTER — RESULTS FOLLOW-UP (OUTPATIENT)
Dept: UROLOGY | Age: 85
End: 2025-04-04

## 2025-04-25 ENCOUNTER — TELEPHONE (OUTPATIENT)
Age: 85
End: 2025-04-25

## 2025-04-25 DIAGNOSIS — I99.9 VASCULAR ABNORMALITY: Primary | ICD-10-CM

## 2025-05-05 ENCOUNTER — OFFICE VISIT (OUTPATIENT)
Dept: VASCULAR SURGERY | Age: 85
End: 2025-05-05
Payer: MEDICARE

## 2025-05-05 VITALS
WEIGHT: 151 LBS | SYSTOLIC BLOOD PRESSURE: 147 MMHG | HEIGHT: 61 IN | DIASTOLIC BLOOD PRESSURE: 94 MMHG | HEART RATE: 75 BPM | BODY MASS INDEX: 28.51 KG/M2 | OXYGEN SATURATION: 93 %

## 2025-05-05 DIAGNOSIS — I73.9 PAD (PERIPHERAL ARTERY DISEASE): Primary | ICD-10-CM

## 2025-05-05 PROCEDURE — 3077F SYST BP >= 140 MM HG: CPT | Performed by: STUDENT IN AN ORGANIZED HEALTH CARE EDUCATION/TRAINING PROGRAM

## 2025-05-05 PROCEDURE — G8400 PT W/DXA NO RESULTS DOC: HCPCS | Performed by: STUDENT IN AN ORGANIZED HEALTH CARE EDUCATION/TRAINING PROGRAM

## 2025-05-05 PROCEDURE — 1159F MED LIST DOCD IN RCRD: CPT | Performed by: STUDENT IN AN ORGANIZED HEALTH CARE EDUCATION/TRAINING PROGRAM

## 2025-05-05 PROCEDURE — G8427 DOCREV CUR MEDS BY ELIG CLIN: HCPCS | Performed by: STUDENT IN AN ORGANIZED HEALTH CARE EDUCATION/TRAINING PROGRAM

## 2025-05-05 PROCEDURE — 99203 OFFICE O/P NEW LOW 30 MIN: CPT | Performed by: STUDENT IN AN ORGANIZED HEALTH CARE EDUCATION/TRAINING PROGRAM

## 2025-05-05 PROCEDURE — 1090F PRES/ABSN URINE INCON ASSESS: CPT | Performed by: STUDENT IN AN ORGANIZED HEALTH CARE EDUCATION/TRAINING PROGRAM

## 2025-05-05 PROCEDURE — 1036F TOBACCO NON-USER: CPT | Performed by: STUDENT IN AN ORGANIZED HEALTH CARE EDUCATION/TRAINING PROGRAM

## 2025-05-05 PROCEDURE — 1123F ACP DISCUSS/DSCN MKR DOCD: CPT | Performed by: STUDENT IN AN ORGANIZED HEALTH CARE EDUCATION/TRAINING PROGRAM

## 2025-05-05 PROCEDURE — G8417 CALC BMI ABV UP PARAM F/U: HCPCS | Performed by: STUDENT IN AN ORGANIZED HEALTH CARE EDUCATION/TRAINING PROGRAM

## 2025-05-05 PROCEDURE — 3079F DIAST BP 80-89 MM HG: CPT | Performed by: STUDENT IN AN ORGANIZED HEALTH CARE EDUCATION/TRAINING PROGRAM

## 2025-05-09 NOTE — TELEPHONE ENCOUNTER
05/09/2025 1350    Leno Zuniga  [facility] UPDATE     Has Patient been sent Prescription?: No      [facility] - RX CANCELLED     Script Cancel Reason: Unable to Contact Patient     Additional Notes:     04/23/2025 1041    Leno Zuniga  [facility] with patient again - sending patient contact letter

## 2025-05-13 NOTE — PROGRESS NOTES
VASCULAR SURGERY   [address] [address]  [phone] FAX: [phone]    Betty Osorio  : 1940    Chief Complaint   Patient presents with    Results    New Patient     [id number]  L-foot soreness bottom, 3rd toe discolored for 43 years       History of Present Illness  The patient is an 84-year-old female who comes in to discuss left lower extremity foot pain and toe discoloration for 43 years.    She reports soreness on the plantar aspect of her left foot. She does not endorse left lower extremity claudication.    Assessment & Plan  1. Asymptomatic peripheral arterial disease.  Decreased arterial flow is not present. Vascular etiology is not associated with bilateral lower extremity foot pain. No further imaging is recommended at this time.    Follow-up  The patient will follow up as needed.    No orders of the defined types were placed in this encounter.    25    VAS DUP LOWER EXTREMITY ARTERIES BILATERAL 2025  6:55 AM (Final)    Interpretation Summary    Right lower extremity:The lower extremity arterial duplex reveals mild, diffuse atherosclerosis without significant stenosis.    Left lower extremity:The lower extremity arterial duplex reveals mild, diffuse atherosclerosis without significant stenosis.    See JACQUELIN in chart performed at [facility]    Signed by: Trent Clark MD on 2025  6:55 AM      Physical Examination:   BP (!) 147/94 (BP Site: Right Upper Arm, Patient Position: Sitting, BP Cuff Size: Medium Adult)   Pulse 75   Ht 1.549 m (5' 1\")   Wt 68.5 kg (151 lb)   SpO2 93%   BMI 28.53 kg/m²     General: No acute distress  HENT: AT  CV: Regular rhythm.    LUNG: No respiratory distress on RA  Abdominal: No distension.  Extremities: No wounds or edema, motor and sensation grossly intact  Vascular:     Past Medical History:   Diagnosis Date    Acute on chronic combined systolic and diastolic heart failure (HCC) 2015    Acute

## 2025-06-06 SDOH — HEALTH STABILITY: PHYSICAL HEALTH: ON AVERAGE, HOW MANY DAYS PER WEEK DO YOU ENGAGE IN MODERATE TO STRENUOUS EXERCISE (LIKE A BRISK WALK)?: 0 DAYS

## 2025-06-06 SDOH — HEALTH STABILITY: PHYSICAL HEALTH: ON AVERAGE, HOW MANY MINUTES DO YOU ENGAGE IN EXERCISE AT THIS LEVEL?: 30 MIN

## 2025-06-09 ENCOUNTER — OFFICE VISIT (OUTPATIENT)
Dept: INTERNAL MEDICINE CLINIC | Facility: CLINIC | Age: 85
End: 2025-06-09
Payer: MEDICARE

## 2025-06-09 VITALS
HEIGHT: 61 IN | DIASTOLIC BLOOD PRESSURE: 76 MMHG | BODY MASS INDEX: 28.55 KG/M2 | SYSTOLIC BLOOD PRESSURE: 132 MMHG | WEIGHT: 151.2 LBS | OXYGEN SATURATION: 96 % | HEART RATE: 81 BPM

## 2025-06-09 DIAGNOSIS — N39.0 FREQUENT UTI: ICD-10-CM

## 2025-06-09 DIAGNOSIS — I27.0 PRIMARY PULMONARY HYPERTENSION (HCC): ICD-10-CM

## 2025-06-09 DIAGNOSIS — Z00.00 INITIAL MEDICARE ANNUAL WELLNESS VISIT: Primary | ICD-10-CM

## 2025-06-09 DIAGNOSIS — I50.32 DIASTOLIC CHF, CHRONIC (HCC): ICD-10-CM

## 2025-06-09 DIAGNOSIS — E55.9 HYPOVITAMINOSIS D: ICD-10-CM

## 2025-06-09 DIAGNOSIS — N18.31 STAGE 3A CHRONIC KIDNEY DISEASE (HCC): ICD-10-CM

## 2025-06-09 DIAGNOSIS — E03.9 ACQUIRED HYPOTHYROIDISM: ICD-10-CM

## 2025-06-09 DIAGNOSIS — I10 PRIMARY HYPERTENSION: ICD-10-CM

## 2025-06-09 DIAGNOSIS — R13.19 ESOPHAGEAL DYSPHAGIA: ICD-10-CM

## 2025-06-09 PROBLEM — Z79.890 ON HORMONE REPLACEMENT THERAPY: Status: ACTIVE | Noted: 2021-12-09

## 2025-06-09 PROBLEM — I25.10 CORONARY ATHEROSCLEROSIS: Status: ACTIVE | Noted: 2021-12-08

## 2025-06-09 LAB
25(OH)D3 SERPL-MCNC: 82 NG/ML (ref 30–100)
ALBUMIN SERPL-MCNC: 3.6 G/DL (ref 3.2–4.6)
ALBUMIN/GLOB SERPL: 0.9 (ref 1–1.9)
ALP SERPL-CCNC: 73 U/L (ref 35–104)
ALT SERPL-CCNC: 14 U/L (ref 8–45)
ANION GAP SERPL CALC-SCNC: 12 MMOL/L (ref 7–16)
APPEARANCE UR: ABNORMAL
AST SERPL-CCNC: 23 U/L (ref 15–37)
BACTERIA URNS QL MICRO: ABNORMAL /HPF
BASOPHILS # BLD: 0.07 K/UL (ref 0–0.2)
BASOPHILS NFR BLD: 0.7 % (ref 0–2)
BILIRUB SERPL-MCNC: 0.5 MG/DL (ref 0–1.2)
BILIRUB UR QL: NEGATIVE
BUN SERPL-MCNC: 19 MG/DL (ref 8–23)
CALCIUM SERPL-MCNC: 10.5 MG/DL (ref 8.8–10.2)
CASTS URNS QL MICRO: 0 /LPF
CHLORIDE SERPL-SCNC: 98 MMOL/L (ref 98–107)
CHOLEST SERPL-MCNC: 193 MG/DL (ref 0–200)
CO2 SERPL-SCNC: 25 MMOL/L (ref 20–29)
COLOR UR: ABNORMAL
CREAT SERPL-MCNC: 1.05 MG/DL (ref 0.6–1.1)
CRYSTALS URNS QL MICRO: 0 /LPF
DIFFERENTIAL METHOD BLD: NORMAL
EOSINOPHIL # BLD: 0.15 K/UL (ref 0–0.8)
EOSINOPHIL NFR BLD: 1.5 % (ref 0.5–7.8)
EPI CELLS #/AREA URNS HPF: ABNORMAL /HPF
ERYTHROCYTE [DISTWIDTH] IN BLOOD BY AUTOMATED COUNT: 14.3 % (ref 11.9–14.6)
GLOBULIN SER CALC-MCNC: 4 G/DL (ref 2.3–3.5)
GLUCOSE SERPL-MCNC: 92 MG/DL (ref 70–99)
GLUCOSE UR STRIP.AUTO-MCNC: NEGATIVE MG/DL
HCT VFR BLD AUTO: 44.2 % (ref 35.8–46.3)
HDLC SERPL-MCNC: 63 MG/DL (ref 40–60)
HDLC SERPL: 3.1 (ref 0–5)
HGB BLD-MCNC: 14.5 G/DL (ref 11.7–15.4)
HGB UR QL STRIP: ABNORMAL
IMM GRANULOCYTES # BLD AUTO: 0.06 K/UL (ref 0–0.5)
IMM GRANULOCYTES NFR BLD AUTO: 0.6 % (ref 0–5)
KETONES UR QL STRIP.AUTO: NEGATIVE MG/DL
LDLC SERPL CALC-MCNC: 105 MG/DL (ref 0–100)
LEUKOCYTE ESTERASE UR QL STRIP.AUTO: ABNORMAL
LYMPHOCYTES # BLD: 2.35 K/UL (ref 0.5–4.6)
LYMPHOCYTES NFR BLD: 23 % (ref 13–44)
MCH RBC QN AUTO: 30.6 PG (ref 26.1–32.9)
MCHC RBC AUTO-ENTMCNC: 32.8 G/DL (ref 31.4–35)
MCV RBC AUTO: 93.2 FL (ref 82–102)
MONOCYTES # BLD: 0.94 K/UL (ref 0.1–1.3)
MONOCYTES NFR BLD: 9.2 % (ref 4–12)
MUCOUS THREADS URNS QL MICRO: 0 /LPF
NEUTS SEG # BLD: 6.63 K/UL (ref 1.7–8.2)
NEUTS SEG NFR BLD: 65 % (ref 43–78)
NITRITE UR QL STRIP.AUTO: NEGATIVE
NRBC # BLD: 0 K/UL (ref 0–0.2)
NT PRO BNP: 1033 PG/ML (ref 0–450)
OTHER OBSERVATIONS: ABNORMAL
PH UR STRIP: 6 (ref 5–9)
PLATELET # BLD AUTO: 234 K/UL (ref 150–450)
PMV BLD AUTO: 11.7 FL (ref 9.4–12.3)
POTASSIUM SERPL-SCNC: 4.4 MMOL/L (ref 3.5–5.1)
PROT SERPL-MCNC: 7.6 G/DL (ref 6.3–8.2)
PROT UR STRIP-MCNC: 30 MG/DL
RBC # BLD AUTO: 4.74 M/UL (ref 4.05–5.2)
RBC #/AREA URNS HPF: ABNORMAL /HPF
SODIUM SERPL-SCNC: 135 MMOL/L (ref 136–145)
SP GR UR REFRACTOMETRY: 1.01 (ref 1–1.02)
TRIGL SERPL-MCNC: 124 MG/DL (ref 0–150)
TSH W FREE THYROID IF ABNORMAL: 1.5 UIU/ML (ref 0.27–4.2)
URINE CULTURE IF INDICATED: ABNORMAL
UROBILINOGEN UR QL STRIP.AUTO: 0.2 EU/DL (ref 0.2–1)
VLDLC SERPL CALC-MCNC: 25 MG/DL (ref 6–23)
WBC # BLD AUTO: 10.2 K/UL (ref 4.3–11.1)
WBC URNS QL MICRO: >100 /HPF

## 2025-06-09 PROCEDURE — G0438 PPPS, INITIAL VISIT: HCPCS

## 2025-06-09 PROCEDURE — 1160F RVW MEDS BY RX/DR IN RCRD: CPT

## 2025-06-09 PROCEDURE — 3075F SYST BP GE 130 - 139MM HG: CPT

## 2025-06-09 PROCEDURE — 1123F ACP DISCUSS/DSCN MKR DOCD: CPT

## 2025-06-09 PROCEDURE — 1159F MED LIST DOCD IN RCRD: CPT

## 2025-06-09 PROCEDURE — 3078F DIAST BP <80 MM HG: CPT

## 2025-06-09 PROCEDURE — 99214 OFFICE O/P EST MOD 30 MIN: CPT

## 2025-06-09 RX ORDER — LEVOTHYROXINE SODIUM 100 UG/1
100 TABLET ORAL DAILY
COMMUNITY
Start: 2025-05-06

## 2025-06-09 SDOH — ECONOMIC STABILITY: FOOD INSECURITY: WITHIN THE PAST 12 MONTHS, YOU WORRIED THAT YOUR FOOD WOULD RUN OUT BEFORE YOU GOT MONEY TO BUY MORE.: NEVER TRUE

## 2025-06-09 SDOH — ECONOMIC STABILITY: FOOD INSECURITY: WITHIN THE PAST 12 MONTHS, THE FOOD YOU BOUGHT JUST DIDN'T LAST AND YOU DIDN'T HAVE MONEY TO GET MORE.: NEVER TRUE

## 2025-06-09 ASSESSMENT — ENCOUNTER SYMPTOMS
TROUBLE SWALLOWING: 1
SHORTNESS OF BREATH: 0
CHEST TIGHTNESS: 0

## 2025-06-09 ASSESSMENT — PATIENT HEALTH QUESTIONNAIRE - PHQ9
SUM OF ALL RESPONSES TO PHQ QUESTIONS 1-9: 0
1. LITTLE INTEREST OR PLEASURE IN DOING THINGS: NOT AT ALL
SUM OF ALL RESPONSES TO PHQ QUESTIONS 1-9: 0
SUM OF ALL RESPONSES TO PHQ QUESTIONS 1-9: 0
2. FEELING DOWN, DEPRESSED OR HOPELESS: NOT AT ALL
SUM OF ALL RESPONSES TO PHQ QUESTIONS 1-9: 0

## 2025-06-09 ASSESSMENT — LIFESTYLE VARIABLES
HOW MANY STANDARD DRINKS CONTAINING ALCOHOL DO YOU HAVE ON A TYPICAL DAY: PATIENT DOES NOT DRINK
HOW OFTEN DO YOU HAVE A DRINK CONTAINING ALCOHOL: NEVER

## 2025-06-09 NOTE — PATIENT INSTRUCTIONS
doctor about stop-smoking programs and medicines. These can increase your chances of quitting for good. Quitting is one of the most important things you can do to protect your heart. It is never too late to quit. Try to avoid secondhand smoke too.     Stay at a weight that's healthy for you. Talk to your doctor if you need help losing weight.     Try to get 7 to 9 hours of sleep each night.     Limit alcohol to 2 drinks a day for men and 1 drink a day for women. Too much alcohol can cause health problems.     Manage other health problems such as diabetes, high blood pressure, and high cholesterol. If you think you may have a problem with alcohol or drug use, talk to your doctor.   Medicines    Take your medicines exactly as prescribed. Call your doctor if you think you are having a problem with your medicine.     If your doctor recommends aspirin, take the amount directed each day. Make sure you take aspirin and not another kind of pain reliever, such as acetaminophen (Tylenol).   When should you call for help?   Call 911 if you have symptoms of a heart attack. These may include:    Chest pain or pressure, or a strange feeling in the chest.     Sweating.     Shortness of breath.     Pain, pressure, or a strange feeling in the back, neck, jaw, or upper belly or in one or both shoulders or arms.     Lightheadedness or sudden weakness.     A fast or irregular heartbeat.   After you call 911, the  may tell you to chew 1 adult-strength or 2 to 4 low-dose aspirin. Wait for an ambulance. Do not try to drive yourself.  Watch closely for changes in your health, and be sure to contact your doctor if you have any problems.  Where can you learn more?  Go to https://www.[employer].net/patientEd and enter F075 to learn more about \"A Healthy Heart: Care Instructions.\"  Current as of: July 31, 2024  Content Version: 14.5  © 2812-5023 [employer].   Care instructions adapted under license by [employer]. If you

## 2025-06-09 NOTE — PROGRESS NOTES
(ZINC ACETATE PO) Take by mouth daily Yes Automatic Reconciliation, Ar   acetaminophen (TYLENOL) 500 MG tablet Take 2 tablets by mouth every 6 hours as needed Yes Automatic Reconciliation, Ar   albuterol sulfate  (90 Base) MCG/ACT inhaler Inhale 1 puff into the lungs as needed Yes Automatic Reconciliation, Ar   Cholecalciferol 50 MCG (2000 UT) TABS Take 1 tablet by mouth daily Yes Automatic Reconciliation, Ar   loperamide (IMODIUM) 2 MG capsule Take 1 capsule by mouth as needed Yes Automatic Reconciliation, Ar   magnesium oxide (MAG-OX) 400 MG tablet Take 1 tablet by mouth daily Yes Automatic Reconciliation, Ar   spironolactone (ALDACTONE) 25 MG tablet Take 0.5 tablets by mouth daily Yes Automatic Reconciliation, Ar   ambrisentan (LETAIRIS) 5 MG tablet Take 1 tablet by mouth daily  Patient not taking: Reported on 3/20/2025  Stephanie Johnson MD   levothyroxine (SYNTHROID) 112 MCG tablet Take 1 tablet by mouth every morning (before breakfast)  Patient not taking: Reported on 6/9/2025  Automatic Reconciliation, Ar       CareTeam (Including outside providers/suppliers regularly involved in providing care):   Patient Care Team:  Jayda Granados FNP as PCP - General (Internal Medicine)  Stephanie Johnson MD (Pulmonary Disease)     Recommendations for Preventive Services Due: see orders and patient instructions/AVS.  Recommended screening schedule for the next 5-10 years is provided to the patient in written form: see Patient Instructions/AVS.     Reviewed and updated this visit:  Tobacco  Allergies  Meds  Problems  Med Hx  Surg Hx  Fam Hx  Sexual   Hx      
\"typo\" or improper phrasing. If any clarification is required please call my office at [phone].

## 2025-06-10 ENCOUNTER — RESULTS FOLLOW-UP (OUTPATIENT)
Dept: INTERNAL MEDICINE CLINIC | Facility: CLINIC | Age: 85
End: 2025-06-10

## 2025-06-10 ASSESSMENT — VISUAL ACUITY: OU: 1

## 2025-06-11 DIAGNOSIS — N95.1 MENOPAUSE SYNDROME: ICD-10-CM

## 2025-06-11 LAB
BACTERIA SPEC CULT: NORMAL
SERVICE CMNT-IMP: NORMAL

## 2025-06-11 RX ORDER — ESTRADIOL 0.5 MG/1
0.5 TABLET ORAL DAILY
Qty: 90 TABLET | Refills: 0 | Status: SHIPPED | OUTPATIENT
Start: 2025-06-11

## 2025-06-11 NOTE — TELEPHONE ENCOUNTER
Pt called for a refill on her hormones. Appointment made and refill to the pharmacy.  
Scribe Attestation (For Scribes USE Only)...

## 2025-06-12 ENCOUNTER — TELEPHONE (OUTPATIENT)
Dept: UROLOGY | Age: 85
End: 2025-06-12

## 2025-06-12 ENCOUNTER — OFFICE VISIT (OUTPATIENT)
Age: 85
End: 2025-06-12
Payer: MEDICARE

## 2025-06-12 VITALS
HEART RATE: 63 BPM | WEIGHT: 150.2 LBS | SYSTOLIC BLOOD PRESSURE: 124 MMHG | HEIGHT: 61 IN | DIASTOLIC BLOOD PRESSURE: 76 MMHG | BODY MASS INDEX: 28.36 KG/M2

## 2025-06-12 DIAGNOSIS — I27.0 PRIMARY PULMONARY HYPERTENSION (HCC): ICD-10-CM

## 2025-06-12 DIAGNOSIS — N18.31 STAGE 3A CHRONIC KIDNEY DISEASE (HCC): ICD-10-CM

## 2025-06-12 DIAGNOSIS — J96.11 CHRONIC RESPIRATORY FAILURE WITH HYPOXIA (HCC): ICD-10-CM

## 2025-06-12 DIAGNOSIS — I49.3 PVC (PREMATURE VENTRICULAR CONTRACTION): Primary | ICD-10-CM

## 2025-06-12 DIAGNOSIS — R09.02 HYPOXEMIA: ICD-10-CM

## 2025-06-12 DIAGNOSIS — I42.0 DILATED CARDIOMYOPATHY (HCC): ICD-10-CM

## 2025-06-12 DIAGNOSIS — I50.32 DIASTOLIC CHF, CHRONIC (HCC): ICD-10-CM

## 2025-06-12 PROCEDURE — 1036F TOBACCO NON-USER: CPT | Performed by: INTERNAL MEDICINE

## 2025-06-12 PROCEDURE — 1126F AMNT PAIN NOTED NONE PRSNT: CPT | Performed by: INTERNAL MEDICINE

## 2025-06-12 PROCEDURE — G8417 CALC BMI ABV UP PARAM F/U: HCPCS | Performed by: INTERNAL MEDICINE

## 2025-06-12 PROCEDURE — 1123F ACP DISCUSS/DSCN MKR DOCD: CPT | Performed by: INTERNAL MEDICINE

## 2025-06-12 PROCEDURE — G8400 PT W/DXA NO RESULTS DOC: HCPCS | Performed by: INTERNAL MEDICINE

## 2025-06-12 PROCEDURE — G8428 CUR MEDS NOT DOCUMENT: HCPCS | Performed by: INTERNAL MEDICINE

## 2025-06-12 PROCEDURE — 1090F PRES/ABSN URINE INCON ASSESS: CPT | Performed by: INTERNAL MEDICINE

## 2025-06-12 PROCEDURE — 3078F DIAST BP <80 MM HG: CPT | Performed by: INTERNAL MEDICINE

## 2025-06-12 PROCEDURE — 99214 OFFICE O/P EST MOD 30 MIN: CPT | Performed by: INTERNAL MEDICINE

## 2025-06-12 PROCEDURE — 3074F SYST BP LT 130 MM HG: CPT | Performed by: INTERNAL MEDICINE

## 2025-06-12 NOTE — PROGRESS NOTES
tobacco   Substance Use Topics    Alcohol use: Never         ROS:    No obvious pertinent positives on review of systems except for what was outlined in the HPI today.      PHYSICAL EXAM:     /76   Pulse 63   Ht 1.549 m (5' 1\")   Wt 68.1 kg (150 lb 3.2 oz)   BMI 28.38 kg/m²    General/Constitutional:   Alert and oriented x 3, no acute distress  HEENT:   normocephalic, atraumatic, moist mucous membranes  Neck:   No JVD or carotid bruits bilaterally  Cardiovascular:   regular rate and rhythm, no murmur/rub/gallop appreciated  Pulmonary:   clear to auscultation bilaterally, no respiratory distress  Abdomen:   soft, non-tender, non-distended  Ext:  mod LE edema bilaterally  Skin:  warm and dry, no obvious rashes seen  Neuro:   no obvious sensory or motor deficits  Psychiatric:   normal mood and affect      Lab Results   Component Value Date/Time     06/09/2025 09:05 AM    K 4.4 06/09/2025 09:05 AM    CL 98 06/09/2025 09:05 AM    CO2 25 06/09/2025 09:05 AM    BUN 19 06/09/2025 09:05 AM    CREATININE 1.05 06/09/2025 09:05 AM    GLUCOSE 92 06/09/2025 09:05 AM    CALCIUM 10.5 06/09/2025 09:05 AM        Lab Results   Component Value Date    WBC 10.2 06/09/2025    HGB 14.5 06/09/2025    HCT 44.2 06/09/2025    MCV 93.2 06/09/2025     06/09/2025       Lab Results   Component Value Date    TSH 2.820 09/04/2024       No results found for: \"LABA1C\"  No results found for: \"EAG\"    Lab Results   Component Value Date    CHOL 193 06/09/2025    CHOL 191 09/04/2024    CHOL 196 11/16/2022     Lab Results   Component Value Date    TRIG 124 06/09/2025    TRIG 90 09/04/2024    TRIG 105 11/16/2022     Lab Results   Component Value Date    HDL 63 (H) 06/09/2025    HDL 62 (H) 09/04/2024    HDL 56 11/16/2022     No components found for: \"LDLCHOLESTEROL\", \"LDLCALC\"  Lab Results   Component Value Date    VLDL 25 (H) 06/09/2025    VLDL 18 09/04/2024    VLDL 21 11/16/2022     Lab Results   Component Value Date    CHOLHDLRATIO

## 2025-06-23 ENCOUNTER — CLINICAL SUPPORT (OUTPATIENT)
Dept: PULMONOLOGY | Age: 85
End: 2025-06-23
Payer: MEDICARE

## 2025-06-23 DIAGNOSIS — J84.9 ILD (INTERSTITIAL LUNG DISEASE) (HCC): Primary | ICD-10-CM

## 2025-06-23 LAB
FEF25-27, POC: 1.49 L/S
FET, POC: NORMAL
FEV 1 , POC: 1.57 L
FEV1/FVC, POC: NORMAL
FVC, POC: NORMAL
LUNG AGE, POC: NORMAL
PEF, POC: 4.42 L/S

## 2025-06-23 PROCEDURE — 94729 DIFFUSING CAPACITY: CPT | Performed by: INTERNAL MEDICINE

## 2025-06-23 PROCEDURE — 94726 PLETHYSMOGRAPHY LUNG VOLUMES: CPT | Performed by: INTERNAL MEDICINE

## 2025-06-23 PROCEDURE — 94010 BREATHING CAPACITY TEST: CPT | Performed by: INTERNAL MEDICINE

## 2025-06-23 NOTE — PROGRESS NOTES
Dr. Maida Johnson MD  [address]  [place] [address]  [phone]    Patient Name:  Betty Osorio        YOB: 1940  Rheumatologist:    Office Visit 6/24/2025    ASSESSMENT AND PLAN:  (Medical Decision Making)    1. Pulmonary hypertension (HCC)  Group 1 PH on tadalafil 40mg and ambrisentan 5mg with low risk status today, doing well.  Not currently requiring her oxygen with exertion.  No edema.  She is currently on low dose spironolactone as well, with lasix 20mg daily.  Echo looks good and symptoms are Functional Class 1.  She can follow up in 6 months in PH clinic.    2. ILD (interstitial lung disease) ([facility])  Reviewed imaging and there has been no progression at all in ILD.    PFTs are stable as well.    Will hold off on any antifibrotic therapy like OFEV at this time.       Stephanie Johnson MD    Total time for encounter on day of encounter was 40 minutes.  This time includes chart prep, review of tests/procedures, review of other provider's notes, documentation and counseling patient regarding disease process and medications.     _________________________________________________________________________  The patient (or guardian, if applicable) and other individuals in attendance with the patient were advised that Artificial Intelligence will be utilized during this visit to record, process the conversation to generate a clinical note, and support improvement of the AI technology.       Reason for visit:  follow up Pulmonary Hypertension    HISTORY OF PRESENT ILLNESS:    Betty Osorio a 84 y.o. female with a PMH significant for Group 1 pulmonary hypertension, Raynaud's of both hands and feet, positive CRUZ, ITP, PVCs, coronary artery disease, obesity who has been followed by Dr. Adams cardiology and Dr. Tirado for pulmonary hypertension.  She reports that her pulmonary hypertension began in 2011.  She currently wears 2 L/min of supplemental oxygen continuously and is

## 2025-06-24 ENCOUNTER — OFFICE VISIT (OUTPATIENT)
Dept: PULMONOLOGY | Age: 85
End: 2025-06-24
Payer: MEDICARE

## 2025-06-24 ENCOUNTER — CLINICAL SUPPORT (OUTPATIENT)
Dept: PULMONOLOGY | Age: 85
End: 2025-06-24
Payer: MEDICARE

## 2025-06-24 VITALS
BODY MASS INDEX: 28.13 KG/M2 | HEART RATE: 84 BPM | SYSTOLIC BLOOD PRESSURE: 138 MMHG | RESPIRATION RATE: 18 BRPM | OXYGEN SATURATION: 94 % | TEMPERATURE: 97.8 F | HEIGHT: 61 IN | DIASTOLIC BLOOD PRESSURE: 79 MMHG | WEIGHT: 149 LBS

## 2025-06-24 DIAGNOSIS — I27.20 PULMONARY HYPERTENSION (HCC): ICD-10-CM

## 2025-06-24 DIAGNOSIS — J84.9 ILD (INTERSTITIAL LUNG DISEASE) (HCC): Primary | ICD-10-CM

## 2025-06-24 DIAGNOSIS — R06.09 DYSPNEA ON EXERTION: ICD-10-CM

## 2025-06-24 PROCEDURE — 1159F MED LIST DOCD IN RCRD: CPT | Performed by: INTERNAL MEDICINE

## 2025-06-24 PROCEDURE — 1126F AMNT PAIN NOTED NONE PRSNT: CPT | Performed by: INTERNAL MEDICINE

## 2025-06-24 PROCEDURE — 3075F SYST BP GE 130 - 139MM HG: CPT | Performed by: INTERNAL MEDICINE

## 2025-06-24 PROCEDURE — G8400 PT W/DXA NO RESULTS DOC: HCPCS | Performed by: INTERNAL MEDICINE

## 2025-06-24 PROCEDURE — 1123F ACP DISCUSS/DSCN MKR DOCD: CPT | Performed by: INTERNAL MEDICINE

## 2025-06-24 PROCEDURE — 1160F RVW MEDS BY RX/DR IN RCRD: CPT | Performed by: INTERNAL MEDICINE

## 2025-06-24 PROCEDURE — 99215 OFFICE O/P EST HI 40 MIN: CPT | Performed by: INTERNAL MEDICINE

## 2025-06-24 PROCEDURE — 94618 PULMONARY STRESS TESTING: CPT | Performed by: INTERNAL MEDICINE

## 2025-06-24 PROCEDURE — G8427 DOCREV CUR MEDS BY ELIG CLIN: HCPCS | Performed by: INTERNAL MEDICINE

## 2025-06-24 PROCEDURE — G8417 CALC BMI ABV UP PARAM F/U: HCPCS | Performed by: INTERNAL MEDICINE

## 2025-06-24 PROCEDURE — 1036F TOBACCO NON-USER: CPT | Performed by: INTERNAL MEDICINE

## 2025-06-24 PROCEDURE — 1090F PRES/ABSN URINE INCON ASSESS: CPT | Performed by: INTERNAL MEDICINE

## 2025-06-24 PROCEDURE — 3078F DIAST BP <80 MM HG: CPT | Performed by: INTERNAL MEDICINE

## 2025-06-24 NOTE — PROGRESS NOTES
[facility] Pulmonary and Critical Care  [facility]  [address], [address]   [place] [id number]  T: [phone]  F: [phone]       6 MINUTE WALK TEST     Patient's Name Betty Osorio   Age 84 y.o.    Gender female   Height 61 inches   Weight 149 lbs   Ordering Provider  ELIA KNIGHT MD          Time Dyspnea  (Cristel Scale)  Fatigue  (Cristel Scale)  Blood Pressure Heart Rate Oxygen SAT RA or   w / O2?    BASELINE 08:05  0 0 138/79 84 94 RA                                                                              POST TEST  08:11 3 2 154/89 100 91 RA     Does the patient use any walking aids? YES  - ROLLATOR .   Medications taken before test are up to date:  Yes  Supplemental oxygen during the test: NO    Stopped or paused before 6 minutes? No  Other symptoms at end of exercise: None    Number of complete laps: 4 x 60 meters = 240 meters + final partial lap: 0 meters = 240 meters    Total distance walked in 6 minutes:  240  Meters  Predicted distance: 354 meters    Percent of predicted distance: 68 %                Tech comments: Patient did well on test.    Test Performed by: ELIA MOCTEZUMA RCP

## 2025-07-15 ENCOUNTER — TELEMEDICINE (OUTPATIENT)
Dept: INTERNAL MEDICINE CLINIC | Facility: CLINIC | Age: 85
End: 2025-07-15
Payer: MEDICARE

## 2025-07-15 DIAGNOSIS — I27.0 PRIMARY PULMONARY HYPERTENSION (HCC): ICD-10-CM
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
## 2025-07-15 DIAGNOSIS — L03.114 CELLULITIS OF LEFT UPPER EXTREMITY: Primary | ICD-10-CM

## 2025-07-15 PROCEDURE — 99214 OFFICE O/P EST MOD 30 MIN: CPT

## 2025-07-15 RX ORDER — AZITHROMYCIN 500 MG/1
500 TABLET, FILM COATED ORAL ONCE
Qty: 1 TABLET | Refills: 0 | Status: SHIPPED | OUTPATIENT
Start: 2025-07-15 | End: 2025-07-15

## 2025-07-15 RX ORDER — AZITHROMYCIN 250 MG/1
TABLET, FILM COATED ORAL
Qty: 4 TABLET | Refills: 0 | Status: SHIPPED | OUTPATIENT
Start: 2025-07-15 | End: 2025-07-25

## 2025-07-15 RX ORDER — MUPIROCIN 2 %
OINTMENT (GRAM) TOPICAL
Qty: 30 G | Refills: 5 | Status: SHIPPED | OUTPATIENT
Start: 2025-07-15

## 2025-07-15 ASSESSMENT — ENCOUNTER SYMPTOMS
SHORTNESS OF BREATH: 0
COUGH: 0

## 2025-07-15 NOTE — PROGRESS NOTES
Betty Osorio, was evaluated through a synchronous (real-time) audio-video encounter. The patient (or guardian if applicable) is aware that this is a billable service, which includes applicable co-pays. This Virtual Visit was conducted with patient's (and/or legal guardian's) consent. Patient identification was verified, and a caregiver was present when appropriate.   The patient was located at Home: [address] [place] [id number]  Provider was located at Facility (Appt Dept): [address]  [place] [address]  Confirm you are appropriately licensed, registered, or certified to deliver care in the state where the patient is located as indicated above. If you are not or unsure, please re-schedule the visit: Yes, I confirm.   Betty Osorio (: @) is a Established patient, presenting virtually for evaluation of   Chief Complaint   Patient presents with    Animal Bite      CAT SCRATCH x3 days   Subjective   7/15/25:  Yesterday Betty was scratched on her left hand by her daughter's cat while in [place] on . She has known the cat for 16 years and it is fully vaccinated and does not go outside. She has kept the scratch clean- admittedly with hydrogen peroxide. While removing her bandage it pulled up her skin with it. She had some bleeding and so has been tending to that with dressing changes. She denies fever and streaking. The entire wrist and hand is \"inflamed\"    Review of Systems   Constitutional:  Negative for chills and fever.   Respiratory:  Negative for cough and shortness of breath.    Cardiovascular:  Negative for chest pain and palpitations.   Skin:  Positive for wound.   Objective   Physical Exam  Nursing note reviewed.   Constitutional:       Appearance: Normal appearance. She is well-groomed.   HENT:      Head: Normocephalic.   Eyes:      General: Gaze aligned appropriately.      Pupils: Pupils are equal, round, and reactive to light.   Pulmonary:

## 2025-07-16 DIAGNOSIS — I50.32 DIASTOLIC CHF, CHRONIC (HCC): ICD-10-CM

## 2025-07-16 DIAGNOSIS — I42.0 DILATED CARDIOMYOPATHY (HCC): ICD-10-CM

## 2025-07-16 LAB
ANION GAP SERPL CALC-SCNC: 13 MMOL/L (ref 7–16)
BUN SERPL-MCNC: 18 MG/DL (ref 8–23)
CALCIUM SERPL-MCNC: 10 MG/DL (ref 8.8–10.2)
CHLORIDE SERPL-SCNC: 97 MMOL/L (ref 98–107)
CO2 SERPL-SCNC: 23 MMOL/L (ref 20–29)
CREAT SERPL-MCNC: 0.97 MG/DL (ref 0.6–1.1)
GLUCOSE SERPL-MCNC: 99 MG/DL (ref 70–99)
NT PRO BNP: 2047 PG/ML (ref 0–450)
POTASSIUM SERPL-SCNC: 4.6 MMOL/L (ref 3.5–5.1)
SODIUM SERPL-SCNC: 133 MMOL/L (ref 136–145)

## 2025-07-16 RX ORDER — TADALAFIL 20 MG/1
2 TABLET ORAL DAILY
Qty: 60 TABLET | Refills: 11 | Status: ACTIVE | OUTPATIENT
Start: 2025-07-16

## 2025-07-22 ENCOUNTER — OFFICE VISIT (OUTPATIENT)
Age: 85
End: 2025-07-22
Payer: MEDICARE

## 2025-07-22 VITALS
BODY MASS INDEX: 28.37 KG/M2 | SYSTOLIC BLOOD PRESSURE: 120 MMHG | DIASTOLIC BLOOD PRESSURE: 76 MMHG | WEIGHT: 150.3 LBS | HEART RATE: 80 BPM | HEIGHT: 61 IN

## 2025-07-22 DIAGNOSIS — I49.3 PVC (PREMATURE VENTRICULAR CONTRACTION): Primary | ICD-10-CM

## 2025-07-22 DIAGNOSIS — I25.119 ATHEROSCLEROSIS OF NATIVE CORONARY ARTERY OF NATIVE HEART WITH ANGINA PECTORIS: ICD-10-CM

## 2025-07-22 DIAGNOSIS — I42.0 DILATED CARDIOMYOPATHY (HCC): ICD-10-CM

## 2025-07-22 DIAGNOSIS — I27.0 PRIMARY PULMONARY HYPERTENSION (HCC): ICD-10-CM

## 2025-07-22 DIAGNOSIS — J96.11 CHRONIC RESPIRATORY FAILURE WITH HYPOXIA (HCC): ICD-10-CM

## 2025-07-22 DIAGNOSIS — J84.10 PULMONARY FIBROSIS (HCC): ICD-10-CM

## 2025-07-22 DIAGNOSIS — I50.32 DIASTOLIC CHF, CHRONIC (HCC): ICD-10-CM

## 2025-07-22 PROCEDURE — 99214 OFFICE O/P EST MOD 30 MIN: CPT | Performed by: INTERNAL MEDICINE

## 2025-07-22 PROCEDURE — 3074F SYST BP LT 130 MM HG: CPT | Performed by: INTERNAL MEDICINE

## 2025-07-22 PROCEDURE — 3078F DIAST BP <80 MM HG: CPT | Performed by: INTERNAL MEDICINE

## 2025-07-22 PROCEDURE — 1036F TOBACCO NON-USER: CPT | Performed by: INTERNAL MEDICINE

## 2025-07-22 PROCEDURE — 1123F ACP DISCUSS/DSCN MKR DOCD: CPT | Performed by: INTERNAL MEDICINE

## 2025-07-22 PROCEDURE — 1090F PRES/ABSN URINE INCON ASSESS: CPT | Performed by: INTERNAL MEDICINE

## 2025-07-22 PROCEDURE — 1126F AMNT PAIN NOTED NONE PRSNT: CPT | Performed by: INTERNAL MEDICINE

## 2025-07-22 PROCEDURE — G8417 CALC BMI ABV UP PARAM F/U: HCPCS | Performed by: INTERNAL MEDICINE

## 2025-07-22 PROCEDURE — G8428 CUR MEDS NOT DOCUMENT: HCPCS | Performed by: INTERNAL MEDICINE

## 2025-07-22 PROCEDURE — G8400 PT W/DXA NO RESULTS DOC: HCPCS | Performed by: INTERNAL MEDICINE

## 2025-07-22 RX ORDER — FUROSEMIDE 40 MG/1
40 TABLET ORAL DAILY
Qty: 90 TABLET | Refills: 3 | Status: SHIPPED | OUTPATIENT
Start: 2025-07-22

## 2025-07-22 NOTE — PROGRESS NOTES
Tobacco Use    Smoking status: Never    Smokeless tobacco: Never    Tobacco comments:     I do not even like the smell of tobacco   Substance Use Topics    Alcohol use: Never         ROS:    No obvious pertinent positives on review of systems except for what was outlined in the HPI today.      PHYSICAL EXAM:     /76   Pulse 80   Ht 1.549 m (5' 1\")   Wt 68.2 kg (150 lb 4.8 oz)   BMI 28.40 kg/m²    General/Constitutional:   Alert and oriented x 3, no acute distress  HEENT:   normocephalic, atraumatic, moist mucous membranes  Neck:   No JVD or carotid bruits bilaterally  Cardiovascular:   regular rate and rhythm, no murmur/rub/gallop appreciated  Pulmonary:   clear to auscultation bilaterally, no respiratory distress  Abdomen:   soft, non-tender, non-distended  Ext:   No sig LE edema bilaterally  Skin:  warm and dry, no obvious rashes seen  Neuro:   no obvious sensory or motor deficits  Psychiatric:   normal mood and affect      Lab Results   Component Value Date/Time     07/16/2025 12:31 PM    K 4.6 07/16/2025 12:31 PM    CL 97 07/16/2025 12:31 PM    CO2 23 07/16/2025 12:31 PM    BUN 18 07/16/2025 12:31 PM    CREATININE 0.97 07/16/2025 12:31 PM    GLUCOSE 99 07/16/2025 12:31 PM    CALCIUM 10.0 07/16/2025 12:31 PM        Lab Results   Component Value Date    WBC 10.2 06/09/2025    HGB 14.5 06/09/2025    HCT 44.2 06/09/2025    MCV 93.2 06/09/2025     06/09/2025       Lab Results   Component Value Date    TSH 2.820 09/04/2024       No results found for: \"LABA1C\"  No results found for: \"EAG\"    Lab Results   Component Value Date    CHOL 193 06/09/2025    CHOL 191 09/04/2024    CHOL 196 11/16/2022     Lab Results   Component Value Date    TRIG 124 06/09/2025    TRIG 90 09/04/2024    TRIG 105 11/16/2022     Lab Results   Component Value Date    HDL 63 (H) 06/09/2025    HDL 62 (H) 09/04/2024    HDL 56 11/16/2022     No components found for: \"LDLCHOLESTEROL\", \"LDLCALC\"  Lab Results   Component Value

## 2025-07-23 NOTE — PROGRESS NOTES
HPI  Betty Osorio is a 85 y.o. female seen for follow up hormones.  The Estrace 0.5 mg is working very well for her.     Past Medical History, Past Surgical History, Family history, Social History, and Medications were all reviewed with the patient today and updated as necessary.     Current Outpatient Medications   Medication Sig    estradiol (ESTRACE) 0.5 MG tablet Take 1 tablet by mouth daily    furosemide (LASIX) 40 MG tablet Take 1 tablet by mouth daily    Tadalafil, PAH, 20 MG tablet TAKE 2 TABLETS BY MOUTH ONCE DAILY    mupirocin (BACTROBAN) 2 % ointment Apply topically 3 times daily.    levothyroxine (SYNTHROID) 100 MCG tablet Take 1 tablet by mouth daily    Multiple Vitamins-Minerals (PRESERVISION AREDS PO) Take by mouth    ELDERBERRY PO Take by mouth    OXYGEN Inhale into the lungs as needed 2-3 L    vitamin C (ASCORBIC ACID) 500 MG tablet Take 1 tablet by mouth daily    Multiple Vitamins-Minerals (THERAPEUTIC MULTIVITAMIN-MINERALS) tablet Take 1 tablet by mouth daily    MILK THISTLE PO Take by mouth    Zinc Acetate, Oral, (ZINC ACETATE PO) Take by mouth daily    acetaminophen (TYLENOL) 500 MG tablet Take 2 tablets by mouth every 6 hours as needed    albuterol sulfate  (90 Base) MCG/ACT inhaler Inhale 1 puff into the lungs as needed    Cholecalciferol 50 MCG (2000 UT) TABS Take 1 tablet by mouth daily    levothyroxine (SYNTHROID) 112 MCG tablet Take 1 tablet by mouth every morning (before breakfast)    loperamide (IMODIUM) 2 MG capsule Take 1 capsule by mouth as needed    magnesium oxide (MAG-OX) 400 MG tablet Take 1 tablet by mouth daily    spironolactone (ALDACTONE) 25 MG tablet Take 0.5 tablets by mouth daily    ambrisentan (LETAIRIS) 5 MG tablet Take 1 tablet by mouth daily (Patient not taking: Reported on 7/28/2025)     Current Facility-Administered Medications   Medication Dose Route Frequency    gentamicin (GARAMYCIN) injection 80 mg  80 mg IntraMUSCular Once     Allergies   Allergen

## 2025-07-28 ENCOUNTER — OFFICE VISIT (OUTPATIENT)
Dept: OBGYN CLINIC | Age: 85
End: 2025-07-28
Payer: MEDICARE

## 2025-07-28 VITALS
SYSTOLIC BLOOD PRESSURE: 130 MMHG | WEIGHT: 150 LBS | DIASTOLIC BLOOD PRESSURE: 88 MMHG | HEIGHT: 61 IN | BODY MASS INDEX: 28.32 KG/M2

## 2025-07-28 DIAGNOSIS — Z12.31 SCREENING MAMMOGRAM, ENCOUNTER FOR: ICD-10-CM

## 2025-07-28 DIAGNOSIS — N95.1 MENOPAUSE SYNDROME: Primary | ICD-10-CM

## 2025-07-28 PROCEDURE — 3075F SYST BP GE 130 - 139MM HG: CPT | Performed by: OBSTETRICS & GYNECOLOGY

## 2025-07-28 PROCEDURE — 3079F DIAST BP 80-89 MM HG: CPT | Performed by: OBSTETRICS & GYNECOLOGY

## 2025-07-28 PROCEDURE — G8400 PT W/DXA NO RESULTS DOC: HCPCS | Performed by: OBSTETRICS & GYNECOLOGY

## 2025-07-28 PROCEDURE — 1159F MED LIST DOCD IN RCRD: CPT | Performed by: OBSTETRICS & GYNECOLOGY

## 2025-07-28 PROCEDURE — G8417 CALC BMI ABV UP PARAM F/U: HCPCS | Performed by: OBSTETRICS & GYNECOLOGY

## 2025-07-28 PROCEDURE — 99214 OFFICE O/P EST MOD 30 MIN: CPT | Performed by: OBSTETRICS & GYNECOLOGY

## 2025-07-28 PROCEDURE — 1090F PRES/ABSN URINE INCON ASSESS: CPT | Performed by: OBSTETRICS & GYNECOLOGY

## 2025-07-28 PROCEDURE — 1123F ACP DISCUSS/DSCN MKR DOCD: CPT | Performed by: OBSTETRICS & GYNECOLOGY

## 2025-07-28 PROCEDURE — 1160F RVW MEDS BY RX/DR IN RCRD: CPT | Performed by: OBSTETRICS & GYNECOLOGY

## 2025-07-28 PROCEDURE — G8427 DOCREV CUR MEDS BY ELIG CLIN: HCPCS | Performed by: OBSTETRICS & GYNECOLOGY

## 2025-07-28 PROCEDURE — 1036F TOBACCO NON-USER: CPT | Performed by: OBSTETRICS & GYNECOLOGY

## 2025-07-28 RX ORDER — ESTRADIOL 0.5 MG/1
0.5 TABLET ORAL DAILY
Qty: 90 TABLET | Refills: 4 | Status: SHIPPED | OUTPATIENT
Start: 2025-07-28

## 2025-07-29 NOTE — PROGRESS NOTES
Take 1 tablet by mouth daily      MILK THISTLE PO Take by mouth      Zinc Acetate, Oral, (ZINC ACETATE PO) Take by mouth daily      acetaminophen (TYLENOL) 500 MG tablet Take 2 tablets by mouth every 6 hours as needed      albuterol sulfate  (90 Base) MCG/ACT inhaler Inhale 1 puff into the lungs as needed      Cholecalciferol 50 MCG (2000 UT) TABS Take 1 tablet by mouth daily      levothyroxine (SYNTHROID) 112 MCG tablet Take 1 tablet by mouth every morning (before breakfast)      loperamide (IMODIUM) 2 MG capsule Take 1 capsule by mouth as needed      magnesium oxide (MAG-OX) 400 MG tablet Take 1 tablet by mouth daily      spironolactone (ALDACTONE) 25 MG tablet Take 0.5 tablets by mouth daily       Current Facility-Administered Medications   Medication Dose Route Frequency Provider Last Rate Last Admin    gentamicin (GARAMYCIN) injection 80 mg  80 mg IntraMUSCular Once             ROS  Review of Systems   Constitutional:  Negative for activity change, appetite change, chills, fatigue, fever and unexpected weight change.   HENT:  Positive for trouble swallowing.    Gastrointestinal:  Negative for abdominal distention, abdominal pain, anal bleeding, blood in stool, constipation, diarrhea, nausea and vomiting.       There were no vitals filed for this visit.    Physical Exam  Vitals and nursing note reviewed.   Constitutional:       General: She is not in acute distress.     Appearance: Normal appearance. She is not ill-appearing, toxic-appearing or diaphoretic.   HENT:      Head: Normocephalic and atraumatic.   Eyes:      General: No scleral icterus.     Conjunctiva/sclera: Conjunctivae normal.   Cardiovascular:      Rate and Rhythm: Normal rate.   Pulmonary:      Effort: Pulmonary effort is normal. No respiratory distress.   Musculoskeletal:      Cervical back: Normal range of motion.   Skin:     General: Skin is warm and dry.   Neurological:      General: No focal deficit present.      Mental Status: She is

## 2025-07-30 ENCOUNTER — OFFICE VISIT (OUTPATIENT)
Dept: GASTROENTEROLOGY | Age: 85
End: 2025-07-30
Payer: MEDICARE

## 2025-07-30 VITALS — HEIGHT: 61 IN | WEIGHT: 150 LBS | BODY MASS INDEX: 28.32 KG/M2

## 2025-07-30 DIAGNOSIS — R13.10 DYSPHAGIA, UNSPECIFIED TYPE: Primary | ICD-10-CM

## 2025-07-30 PROCEDURE — G8417 CALC BMI ABV UP PARAM F/U: HCPCS | Performed by: PHYSICIAN ASSISTANT

## 2025-07-30 PROCEDURE — G8400 PT W/DXA NO RESULTS DOC: HCPCS | Performed by: PHYSICIAN ASSISTANT

## 2025-07-30 PROCEDURE — 1036F TOBACCO NON-USER: CPT | Performed by: PHYSICIAN ASSISTANT

## 2025-07-30 PROCEDURE — 99204 OFFICE O/P NEW MOD 45 MIN: CPT | Performed by: PHYSICIAN ASSISTANT

## 2025-07-30 PROCEDURE — G8427 DOCREV CUR MEDS BY ELIG CLIN: HCPCS | Performed by: PHYSICIAN ASSISTANT

## 2025-07-30 PROCEDURE — 1159F MED LIST DOCD IN RCRD: CPT | Performed by: PHYSICIAN ASSISTANT

## 2025-07-30 PROCEDURE — 1090F PRES/ABSN URINE INCON ASSESS: CPT | Performed by: PHYSICIAN ASSISTANT

## 2025-07-30 PROCEDURE — 1123F ACP DISCUSS/DSCN MKR DOCD: CPT | Performed by: PHYSICIAN ASSISTANT

## 2025-07-30 PROCEDURE — 1160F RVW MEDS BY RX/DR IN RCRD: CPT | Performed by: PHYSICIAN ASSISTANT

## 2025-07-30 ASSESSMENT — ENCOUNTER SYMPTOMS
ANAL BLEEDING: 0
NAUSEA: 0
DIARRHEA: 0
BLOOD IN STOOL: 0
CONSTIPATION: 0
ABDOMINAL PAIN: 0
TROUBLE SWALLOWING: 1
VOMITING: 0
ABDOMINAL DISTENTION: 0

## 2025-08-05 ENCOUNTER — ANESTHESIA EVENT (OUTPATIENT)
Dept: ENDOSCOPY | Age: 85
End: 2025-08-05

## 2025-08-06 ENCOUNTER — ANESTHESIA (OUTPATIENT)
Dept: ENDOSCOPY | Age: 85
End: 2025-08-06

## 2025-08-06 ASSESSMENT — ENCOUNTER SYMPTOMS
DYSPNEA ACTIVITY LEVEL: AFTER AMBULATING 1 FLIGHT OF STAIRS
SHORTNESS OF BREATH: 1

## (undated) DEVICE — TRAY PREP DRY W/ PREM GLV 2 APPL 6 SPNG 2 UNDPD 1 OVERWRAP

## (undated) DEVICE — GARMENT,MEDLINE,DVT,INT,CALF,MED, GEN2: Brand: MEDLINE

## (undated) DEVICE — CYSTO/BLADDER IRRIGATION SET, REGULATING CLAMP

## (undated) DEVICE — LEGGINGS, PAIR, 31X48, STERILE: Brand: MEDLINE

## (undated) DEVICE — JELLY,LUBE,STERILE,FLIP TOP,TUBE,4-OZ: Brand: MEDLINE

## (undated) DEVICE — SOLUTION IRRIG 3000ML H2O STRL BAG

## (undated) DEVICE — CATHETER URET 5FR L70CM TIP 8FR OPN END CONE TIP INJ HUB

## (undated) DEVICE — PACK SURGICAL PROCEDURE KIT CYSTOSCOPY TOTE